# Patient Record
Sex: FEMALE | Race: WHITE | NOT HISPANIC OR LATINO | Employment: FULL TIME | URBAN - NONMETROPOLITAN AREA
[De-identification: names, ages, dates, MRNs, and addresses within clinical notes are randomized per-mention and may not be internally consistent; named-entity substitution may affect disease eponyms.]

---

## 2018-02-08 ENCOUNTER — OFFICE VISIT CONVERTED (OUTPATIENT)
Dept: FAMILY MEDICINE CLINIC | Age: 53
End: 2018-02-08
Attending: NURSE PRACTITIONER

## 2018-06-05 ENCOUNTER — OFFICE VISIT CONVERTED (OUTPATIENT)
Dept: FAMILY MEDICINE CLINIC | Age: 53
End: 2018-06-05
Attending: NURSE PRACTITIONER

## 2018-06-07 LAB
ALBUMIN SERPL-MCNC: 4.5 G/DL
ALBUMIN/GLOB SERPL: 2 {RATIO}
ALP SERPL-CCNC: 95 IU/L
ALT SERPL-CCNC: 36 IU/L
AST SERPL-CCNC: 30 IU/L
BASOPHILS # BLD AUTO: 0 X10E3/UL
BASOPHILS NFR BLD AUTO: 0 %
BILIRUB SERPL-MCNC: 0.4 MG/DL
BUN SERPL-MCNC: 14 MG/DL
BUN/CREAT SERPL: 22
CALCIUM SERPL-MCNC: 9.6 MG/DL
CHLORIDE SERPL-SCNC: 98 MMOL/L
CO2 SERPL-SCNC: 25 MMOL/L
CONV IMMATURE GRAN: 0 %
CONV TOTAL PROTEIN: 6.8 G/DL
CREAT UR-MCNC: 0.64 MG/DL
EOSINOPHIL # BLD AUTO: 0.2 X10E3/UL
EOSINOPHIL # BLD AUTO: 2 %
ERYTHROCYTE [DISTWIDTH] IN BLOOD BY AUTOMATED COUNT: 14 %
GLOBULIN UR ELPH-MCNC: 2.3 G/DL
GLUCOSE SERPL-MCNC: 192 MG/DL
HBA1C MFR BLD: 8.3 %
HCT VFR BLD AUTO: 39 %
HGB BLD-MCNC: 12.6 G/DL
IMM GRANULOCYTES # BLD: 0 X10E3/UL
LYMPHOCYTES # BLD AUTO: 2.4 X10E3/UL
LYMPHOCYTES NFR BLD AUTO: 30 %
MCH RBC QN AUTO: 28.2 PG
MCHC RBC AUTO-ENTMCNC: 32.3 G/DL
MCV RBC AUTO: 87 FL
MONOCYTES # BLD AUTO: 0.5 X10E3/UL
MONOCYTES NFR BLD AUTO: 7 %
NEUTROPHILS # BLD AUTO: 5 X10E3/UL
NEUTROPHILS NFR BLD AUTO: 61 %
PLATELET # BLD AUTO: 212 X10E3/UL
POTASSIUM SERPL-SCNC: 4.2 MMOL/L
RBC # BLD AUTO: 4.47 X10E6/UL
SODIUM SERPL-SCNC: 141 MMOL/L
TSH SERPL-ACNC: 2.38 UIU/ML
WBC # BLD AUTO: 8.2 X10E3/UL

## 2018-07-12 ENCOUNTER — OFFICE VISIT CONVERTED (OUTPATIENT)
Dept: FAMILY MEDICINE CLINIC | Age: 53
End: 2018-07-12
Attending: NURSE PRACTITIONER

## 2018-10-03 ENCOUNTER — OFFICE VISIT CONVERTED (OUTPATIENT)
Dept: FAMILY MEDICINE CLINIC | Age: 53
End: 2018-10-03
Attending: NURSE PRACTITIONER

## 2018-10-03 ENCOUNTER — CONVERSION ENCOUNTER (OUTPATIENT)
Dept: FAMILY MEDICINE CLINIC | Age: 53
End: 2018-10-03

## 2018-10-04 LAB
ALBUMIN SERPL-MCNC: 4.8 G/DL
ALBUMIN/GLOB SERPL: 2.3 {RATIO}
ALP SERPL-CCNC: 92 IU/L
ALT SERPL-CCNC: 31 IU/L
AMYLASE SERPL-CCNC: 47 U/L
AST SERPL-CCNC: 26 IU/L
BASOPHILS # BLD AUTO: 0 X10E3/UL
BASOPHILS NFR BLD AUTO: 0 %
BILIRUB SERPL-MCNC: 0.3 MG/DL
BUN SERPL-MCNC: 17 MG/DL
BUN/CREAT SERPL: 32
CALCIUM SERPL-MCNC: 10.4 MG/DL
CHLORIDE SERPL-SCNC: 101 MMOL/L
CO2 SERPL-SCNC: 22 MMOL/L
CONV IMMATURE GRAN: 0 %
CONV TOTAL PROTEIN: 6.9 G/DL
CREAT UR-MCNC: 0.53 MG/DL
EOSINOPHIL # BLD AUTO: 0.2 X10E3/UL
EOSINOPHIL # BLD AUTO: 2 %
ERYTHROCYTE [DISTWIDTH] IN BLOOD BY AUTOMATED COUNT: 14.2 %
GLOBULIN UR ELPH-MCNC: 2.1 G/DL
GLUCOSE SERPL-MCNC: 163 MG/DL
HCT VFR BLD AUTO: 39.1 %
HGB BLD-MCNC: 13.1 G/DL
IMM GRANULOCYTES # BLD: 0 X10E3/UL
LIPASE SERPL-CCNC: 23 U/L
LYMPHOCYTES # BLD AUTO: 3 X10E3/UL
LYMPHOCYTES NFR BLD AUTO: 31 %
MCH RBC QN AUTO: 28.1 PG
MCHC RBC AUTO-ENTMCNC: 33.5 G/DL
MCV RBC AUTO: 84 FL
MONOCYTES # BLD AUTO: 0.6 X10E3/UL
MONOCYTES NFR BLD AUTO: 6 %
NEUTROPHILS # BLD AUTO: 5.7 X10E3/UL
NEUTROPHILS NFR BLD AUTO: 61 %
PLATELET # BLD AUTO: 236 X10E3/UL
POTASSIUM SERPL-SCNC: 4.5 MMOL/L
RBC # BLD AUTO: 4.66 X10E6/UL
SODIUM SERPL-SCNC: 142 MMOL/L
WBC # BLD AUTO: 9.5 X10E3/UL

## 2018-10-05 LAB — HBA1C MFR BLD: 7.8 %

## 2018-10-11 ENCOUNTER — OFFICE VISIT CONVERTED (OUTPATIENT)
Dept: FAMILY MEDICINE CLINIC | Age: 53
End: 2018-10-11
Attending: NURSE PRACTITIONER

## 2018-11-30 ENCOUNTER — CONVERSION ENCOUNTER (OUTPATIENT)
Dept: OTHER | Facility: HOSPITAL | Age: 53
End: 2018-11-30

## 2019-01-10 ENCOUNTER — HOSPITAL ENCOUNTER (OUTPATIENT)
Dept: OTHER | Facility: HOSPITAL | Age: 54
Discharge: HOME | End: 2019-01-10

## 2019-01-10 ENCOUNTER — OFFICE VISIT CONVERTED (OUTPATIENT)
Dept: FAMILY MEDICINE CLINIC | Age: 54
End: 2019-01-10
Attending: NURSE PRACTITIONER

## 2019-01-12 LAB — BACTERIA SPEC AEROBE CULT: NORMAL

## 2019-01-21 ENCOUNTER — OFFICE VISIT CONVERTED (OUTPATIENT)
Dept: FAMILY MEDICINE CLINIC | Age: 54
End: 2019-01-21
Attending: NURSE PRACTITIONER

## 2019-02-05 LAB
ALBUMIN SERPL-MCNC: 4.6 G/DL
ALBUMIN/GLOB SERPL: 2.2 {RATIO}
ALP SERPL-CCNC: 82 IU/L
ALT SERPL-CCNC: 45 IU/L
AST SERPL-CCNC: 32 IU/L
BASOPHILS # BLD AUTO: 0 X10E3/UL
BASOPHILS NFR BLD AUTO: 0 %
BILIRUB SERPL-MCNC: 0.3 MG/DL
BUN SERPL-MCNC: 11 MG/DL
BUN/CREAT SERPL: 18
CALCIUM SERPL-MCNC: 9.3 MG/DL
CHLORIDE SERPL-SCNC: 103 MMOL/L
CHOLEST SERPL-MCNC: 120 MG/DL
CO2 SERPL-SCNC: 23 MMOL/L
CONV IMMATURE GRAN: 0 %
CONV TOTAL PROTEIN: 6.7 G/DL
CREAT UR-MCNC: 0.6 MG/DL
EOSINOPHIL # BLD AUTO: 0.1 X10E3/UL
EOSINOPHIL # BLD AUTO: 2 %
ERYTHROCYTE [DISTWIDTH] IN BLOOD BY AUTOMATED COUNT: 14.4 %
GLOBULIN UR ELPH-MCNC: 2.1 G/DL
GLUCOSE SERPL-MCNC: 153 MG/DL
HBA1C MFR BLD: 8.1 %
HCT VFR BLD AUTO: 38.8 %
HDLC SERPL-MCNC: 38 MG/DL
HGB BLD-MCNC: 12.1 G/DL
IMM GRANULOCYTES # BLD: 0 X10E3/UL
LDLC SERPL CALC-MCNC: 41 MG/DL
LYMPHOCYTES # BLD AUTO: 2.5 X10E3/UL
LYMPHOCYTES NFR BLD AUTO: 40 %
MCH RBC QN AUTO: 27.5 PG
MCHC RBC AUTO-ENTMCNC: 31.2 G/DL
MCV RBC AUTO: 88 FL
MICROALBUMIN UR-MCNC: 30.8 UG/ML
MONOCYTES # BLD AUTO: 0.3 X10E3/UL
MONOCYTES NFR BLD AUTO: 5 %
NEUTROPHILS # BLD AUTO: 3.2 X10E3/UL
NEUTROPHILS NFR BLD AUTO: 53 %
PLATELET # BLD AUTO: 186 X10E3/UL
POTASSIUM SERPL-SCNC: 4.1 MMOL/L
RBC # BLD AUTO: 4.4 X10E6/UL
SODIUM SERPL-SCNC: 145 MMOL/L
TRIGL SERPL-MCNC: 205 MG/DL
TSH SERPL-ACNC: 2.14 UIU/ML
VLDLC SERPL-MCNC: 41 MG/DL
WBC # BLD AUTO: 6.2 X10E3/UL

## 2019-02-15 ENCOUNTER — OFFICE VISIT CONVERTED (OUTPATIENT)
Dept: FAMILY MEDICINE CLINIC | Age: 54
End: 2019-02-15
Attending: NURSE PRACTITIONER

## 2019-04-01 ENCOUNTER — OFFICE VISIT CONVERTED (OUTPATIENT)
Dept: FAMILY MEDICINE CLINIC | Age: 54
End: 2019-04-01
Attending: NURSE PRACTITIONER

## 2019-06-04 ENCOUNTER — OFFICE VISIT CONVERTED (OUTPATIENT)
Dept: FAMILY MEDICINE CLINIC | Age: 54
End: 2019-06-04
Attending: NURSE PRACTITIONER

## 2019-06-15 LAB
ALBUMIN SERPL-MCNC: 4.7 G/DL
ALBUMIN/GLOB SERPL: 2 {RATIO}
ALP SERPL-CCNC: 90 IU/L
ALT SERPL-CCNC: 26 IU/L
AST SERPL-CCNC: 22 IU/L
BASOPHILS # BLD AUTO: 0 X10E3/UL
BASOPHILS NFR BLD AUTO: 0 %
BILIRUB SERPL-MCNC: 0.3 MG/DL
BUN SERPL-MCNC: 16 MG/DL
BUN/CREAT SERPL: 28
CALCIUM SERPL-MCNC: 10.4 MG/DL
CHLORIDE SERPL-SCNC: 98 MMOL/L
CHOLEST SERPL-MCNC: 105 MG/DL
CO2 SERPL-SCNC: 24 MMOL/L
CONV IMMATURE GRAN: 0 %
CONV TOTAL PROTEIN: 7 G/DL
CREAT UR-MCNC: 0.57 MG/DL
EOSINOPHIL # BLD AUTO: 0.1 X10E3/UL
EOSINOPHIL # BLD AUTO: 1 %
ERYTHROCYTE [DISTWIDTH] IN BLOOD BY AUTOMATED COUNT: 13.5 %
GLOBULIN UR ELPH-MCNC: 2.3 G/DL
GLUCOSE SERPL-MCNC: 171 MG/DL
HBA1C MFR BLD: 7.7 %
HCT VFR BLD AUTO: 41.8 %
HDLC SERPL-MCNC: 39 MG/DL
HGB BLD-MCNC: 13.6 G/DL
IMM GRANULOCYTES # BLD: 0 X10E3/UL
LDLC SERPL CALC-MCNC: 38 MG/DL
LYMPHOCYTES # BLD AUTO: 2.8 X10E3/UL
LYMPHOCYTES NFR BLD AUTO: 29 %
MCH RBC QN AUTO: 28.6 PG
MCHC RBC AUTO-ENTMCNC: 32.5 G/DL
MCV RBC AUTO: 88 FL
MONOCYTES # BLD AUTO: 0.7 X10E3/UL
MONOCYTES NFR BLD AUTO: 7 %
NEUTROPHILS # BLD AUTO: 6.1 X10E3/UL
NEUTROPHILS NFR BLD AUTO: 63 %
PLATELET # BLD AUTO: 229 X10E3/UL
POTASSIUM SERPL-SCNC: 4.2 MMOL/L
RBC # BLD AUTO: 4.75 X10E6/UL
SODIUM SERPL-SCNC: 138 MMOL/L
TRIGL SERPL-MCNC: 140 MG/DL
TSH SERPL-ACNC: 2.32 UIU/ML
VLDLC SERPL-MCNC: 28 MG/DL
WBC # BLD AUTO: 9.7 X10E3/UL

## 2019-07-19 ENCOUNTER — CONVERSION ENCOUNTER (OUTPATIENT)
Dept: FAMILY MEDICINE CLINIC | Age: 54
End: 2019-07-19

## 2019-07-20 LAB — PTH-INTACT SERPL-MCNC: 23 PG/ML

## 2019-09-25 ENCOUNTER — OFFICE VISIT CONVERTED (OUTPATIENT)
Dept: FAMILY MEDICINE CLINIC | Age: 54
End: 2019-09-25
Attending: NURSE PRACTITIONER

## 2019-11-19 ENCOUNTER — OFFICE VISIT CONVERTED (OUTPATIENT)
Dept: FAMILY MEDICINE CLINIC | Age: 54
End: 2019-11-19
Attending: NURSE PRACTITIONER

## 2019-11-22 ENCOUNTER — OFFICE VISIT CONVERTED (OUTPATIENT)
Dept: FAMILY MEDICINE CLINIC | Age: 54
End: 2019-11-22
Attending: NURSE PRACTITIONER

## 2019-11-26 ENCOUNTER — HOSPITAL ENCOUNTER (OUTPATIENT)
Dept: OTHER | Facility: HOSPITAL | Age: 54
Discharge: HOME | End: 2019-11-26

## 2019-12-11 ENCOUNTER — OFFICE VISIT CONVERTED (OUTPATIENT)
Dept: FAMILY MEDICINE CLINIC | Age: 54
End: 2019-12-11
Attending: NURSE PRACTITIONER

## 2019-12-21 LAB
ALBUMIN SERPL-MCNC: 4.9 G/DL
ALBUMIN/GLOB SERPL: 2.3 {RATIO}
ALP SERPL-CCNC: 89 IU/L
ALT SERPL-CCNC: 29 IU/L
AST SERPL-CCNC: 22 IU/L
BASOPHILS # BLD AUTO: 0 X10E3/UL
BASOPHILS NFR BLD AUTO: 0 %
BILIRUB SERPL-MCNC: 0.3 MG/DL
BUN SERPL-MCNC: 14 MG/DL
BUN/CREAT SERPL: 19
CALCIUM SERPL-MCNC: 10.2 MG/DL
CHLORIDE SERPL-SCNC: 98 MMOL/L
CHOLEST SERPL-MCNC: 122 MG/DL
CO2 SERPL-SCNC: 26 MMOL/L
CONV IMMATURE GRAN: 0 %
CONV TOTAL PROTEIN: 7 G/DL
CREAT UR-MCNC: 0.73 MG/DL
EOSINOPHIL # BLD AUTO: 0.2 X10E3/UL
EOSINOPHIL # BLD AUTO: 2 %
ERYTHROCYTE [DISTWIDTH] IN BLOOD BY AUTOMATED COUNT: 14.2 %
GLOBULIN UR ELPH-MCNC: 2.1 G/DL
GLUCOSE SERPL-MCNC: 194 MG/DL
HBA1C MFR BLD: 7.3 %
HCT VFR BLD AUTO: 42.8 %
HDLC SERPL-MCNC: 41 MG/DL
HGB BLD-MCNC: 13.9 G/DL
IMM GRANULOCYTES # BLD: 0 X10E3/UL
LDLC SERPL CALC-MCNC: 50 MG/DL
LYMPHOCYTES # BLD AUTO: 3.1 X10E3/UL
LYMPHOCYTES NFR BLD AUTO: 33 %
MCH RBC QN AUTO: 28.4 PG
MCHC RBC AUTO-ENTMCNC: 32.5 G/DL
MCV RBC AUTO: 88 FL
MICROALBUMIN UR-MCNC: 15.5 UG/ML
MONOCYTES # BLD AUTO: 0.6 X10E3/UL
MONOCYTES NFR BLD AUTO: 6 %
NEUTROPHILS # BLD AUTO: 5.5 X10E3/UL
NEUTROPHILS NFR BLD AUTO: 59 %
PLATELET # BLD AUTO: 252 X10E3/UL
POTASSIUM SERPL-SCNC: 4.5 MMOL/L
RBC # BLD AUTO: 4.89 X10E6/UL
SODIUM SERPL-SCNC: 141 MMOL/L
TRIGL SERPL-MCNC: 154 MG/DL
TSH SERPL-ACNC: 2.82 UIU/ML
VLDLC SERPL-MCNC: 31 MG/DL
WBC # BLD AUTO: 9.3 X10E3/UL

## 2019-12-23 ENCOUNTER — HOSPITAL ENCOUNTER (OUTPATIENT)
Dept: OTHER | Facility: HOSPITAL | Age: 54
Discharge: HOME | End: 2019-12-23

## 2020-01-23 ENCOUNTER — OFFICE VISIT CONVERTED (OUTPATIENT)
Dept: FAMILY MEDICINE CLINIC | Age: 55
End: 2020-01-23
Attending: NURSE PRACTITIONER

## 2020-03-13 ENCOUNTER — OFFICE VISIT CONVERTED (OUTPATIENT)
Dept: FAMILY MEDICINE CLINIC | Age: 55
End: 2020-03-13
Attending: NURSE PRACTITIONER

## 2020-05-01 ENCOUNTER — CONVERSION ENCOUNTER (OUTPATIENT)
Dept: FAMILY MEDICINE CLINIC | Age: 55
End: 2020-05-01

## 2020-05-02 LAB
ALBUMIN SERPL-MCNC: 4.8 G/DL
ALBUMIN/GLOB SERPL: 2.3 {RATIO}
ALP SERPL-CCNC: 89 IU/L
ALT SERPL-CCNC: 42 IU/L
AST SERPL-CCNC: 34 IU/L
BILIRUB SERPL-MCNC: 0.4 MG/DL
BUN SERPL-MCNC: 12 MG/DL
BUN/CREAT SERPL: 17
CALCIUM SERPL-MCNC: 9.6 MG/DL
CHLORIDE SERPL-SCNC: 98 MMOL/L
CO2 SERPL-SCNC: 23 MMOL/L
CONV TOTAL PROTEIN: 6.9 G/DL
CREAT UR-MCNC: 0.7 MG/DL
GLOBULIN UR ELPH-MCNC: 2.1 G/DL
GLUCOSE SERPL-MCNC: 160 MG/DL
HBA1C MFR BLD: 7.1 %
POTASSIUM SERPL-SCNC: 4.4 MMOL/L
SODIUM SERPL-SCNC: 141 MMOL/L

## 2020-07-08 ENCOUNTER — OFFICE VISIT CONVERTED (OUTPATIENT)
Dept: FAMILY MEDICINE CLINIC | Age: 55
End: 2020-07-08
Attending: NURSE PRACTITIONER

## 2020-09-21 ENCOUNTER — OFFICE VISIT CONVERTED (OUTPATIENT)
Dept: FAMILY MEDICINE CLINIC | Age: 55
End: 2020-09-21
Attending: NURSE PRACTITIONER

## 2020-10-02 ENCOUNTER — CONVERSION ENCOUNTER (OUTPATIENT)
Dept: FAMILY MEDICINE CLINIC | Age: 55
End: 2020-10-02

## 2020-10-03 LAB
ALBUMIN SERPL-MCNC: 4.4 G/DL
ALBUMIN/GLOB SERPL: 1.9 {RATIO}
ALP SERPL-CCNC: 93 IU/L
ALT SERPL-CCNC: 42 IU/L
AST SERPL-CCNC: 33 IU/L
BILIRUB SERPL-MCNC: 0.4 MG/DL
BUN SERPL-MCNC: 19 MG/DL
BUN/CREAT SERPL: 26
CALCIUM SERPL-MCNC: 9.3 MG/DL
CHLORIDE SERPL-SCNC: 102 MMOL/L
CHOLEST SERPL-MCNC: 106 MG/DL
CO2 SERPL-SCNC: 24 MMOL/L
CONV TOTAL PROTEIN: 6.7 G/DL
CREAT 24H UR-MCNC: 187.7 MG/DL
CREAT UR-MCNC: 0.72 MG/DL
GLOBULIN UR ELPH-MCNC: 2.3 G/DL
GLUCOSE SERPL-MCNC: 167 MG/DL
HBA1C MFR BLD: 7.6 %
HDLC SERPL-MCNC: 37 MG/DL
LDLC SERPL CALC-MCNC: 44 MG/DL
MICROALBUMIN UR-MCNC: 37.2 UG/ML
MICROALBUMIN/CREAT UR: 20 MG/G CREAT
POTASSIUM SERPL-SCNC: 4.7 MMOL/L
SODIUM SERPL-SCNC: 141 MMOL/L
TRIGL SERPL-MCNC: 142 MG/DL
TSH SERPL-ACNC: 3.05 UIU/ML
VLDLC SERPL-MCNC: 25 MG/DL

## 2020-12-18 ENCOUNTER — OFFICE VISIT CONVERTED (OUTPATIENT)
Dept: FAMILY MEDICINE CLINIC | Age: 55
End: 2020-12-18
Attending: NURSE PRACTITIONER

## 2021-03-08 ENCOUNTER — OFFICE VISIT CONVERTED (OUTPATIENT)
Dept: FAMILY MEDICINE CLINIC | Age: 56
End: 2021-03-08
Attending: NURSE PRACTITIONER

## 2021-03-09 ENCOUNTER — HOSPITAL ENCOUNTER (OUTPATIENT)
Dept: OTHER | Facility: HOSPITAL | Age: 56
Discharge: HOME | End: 2021-03-09
Attending: NURSE PRACTITIONER

## 2021-03-13 LAB
CONV LAST MENSTURAL PERIOD: NORMAL
HPV HYBRID CAPTURE HIGH RISK: NEGATIVE
PATHOLOGIST REVIEW: NORMAL
SPECIMEN SOURCE: NORMAL
SPECIMEN SOURCE: NORMAL
THIN PREP CVX: NORMAL

## 2021-03-30 ENCOUNTER — CONVERSION ENCOUNTER (OUTPATIENT)
Dept: FAMILY MEDICINE CLINIC | Age: 56
End: 2021-03-30

## 2021-03-31 LAB
ALBUMIN SERPL-MCNC: 4.7 G/DL
ALBUMIN/GLOB SERPL: 2 {RATIO}
ALP SERPL-CCNC: 94 IU/L
ALT SERPL-CCNC: 35 IU/L
AST SERPL-CCNC: 30 IU/L
BILIRUB SERPL-MCNC: 0.3 MG/DL
BUN SERPL-MCNC: 21 MG/DL
BUN/CREAT SERPL: 30
CALCIUM SERPL-MCNC: 10 MG/DL
CHLORIDE SERPL-SCNC: 100 MMOL/L
CHOLEST SERPL-MCNC: 112 MG/DL
CO2 SERPL-SCNC: 22 MMOL/L
CONV TOTAL PROTEIN: 7 G/DL
CREAT 24H UR-MCNC: 55.3 MG/DL
CREAT UR-MCNC: 0.7 MG/DL
GLOBULIN UR ELPH-MCNC: 2.3 G/DL
GLUCOSE SERPL-MCNC: 177 MG/DL
HBA1C MFR BLD: 7.2 %
HDLC SERPL-MCNC: 44 MG/DL
LDLC SERPL CALC-MCNC: 43 MG/DL
MICROALBUMIN UR-MCNC: 6.1 UG/ML
MICROALBUMIN/CREAT UR: 11 MG/G CREAT
POTASSIUM SERPL-SCNC: 4.2 MMOL/L
SODIUM SERPL-SCNC: 140 MMOL/L
TRIGL SERPL-MCNC: 147 MG/DL
TSH SERPL-ACNC: 2.01 UIU/ML
VLDLC SERPL-MCNC: 25 MG/DL

## 2021-05-06 ENCOUNTER — OFFICE VISIT (OUTPATIENT)
Dept: ORTHOPEDIC SURGERY | Facility: CLINIC | Age: 56
End: 2021-05-06

## 2021-05-06 VITALS — TEMPERATURE: 97.5 F | BODY MASS INDEX: 43.95 KG/M2 | WEIGHT: 218 LBS | HEIGHT: 59 IN

## 2021-05-06 DIAGNOSIS — S83.8X9A: Primary | ICD-10-CM

## 2021-05-06 PROCEDURE — 99204 OFFICE O/P NEW MOD 45 MIN: CPT | Performed by: ORTHOPAEDIC SURGERY

## 2021-05-06 RX ORDER — LOSARTAN POTASSIUM 50 MG/1
50 TABLET ORAL DAILY
COMMUNITY
End: 2021-07-26

## 2021-05-06 RX ORDER — BUPROPION HYDROCHLORIDE 150 MG/1
150 TABLET, EXTENDED RELEASE ORAL DAILY
COMMUNITY
End: 2021-08-30

## 2021-05-06 RX ORDER — ROSUVASTATIN CALCIUM 10 MG/1
10 TABLET, COATED ORAL DAILY
COMMUNITY
End: 2021-08-24

## 2021-05-06 RX ORDER — HYDROCHLOROTHIAZIDE 12.5 MG/1
12.5 TABLET ORAL DAILY
COMMUNITY
End: 2021-07-26

## 2021-05-06 RX ORDER — PHENOL 1.4 %
600 AEROSOL, SPRAY (ML) MUCOUS MEMBRANE DAILY
COMMUNITY
End: 2021-08-31

## 2021-05-18 NOTE — PROGRESS NOTES
Viky Swan 1965     Office/Outpatient Visit    Visit Date: Thu, Feb 8, 2018 12:12 pm    Provider: Clementina Barrientos N.P. (Assistant: Pauline Cota MA)    Location: [facility]        Electronically signed by Clementina Barrientos N.P. on  02/08/2018 01:19:20 PM                             SUBJECTIVE:        CC:     Mr. Swan is a 52 year old White female.  This is a follow-up visit.          HPI:         Patient to be evaluated for depression with anxiety.  States doing well. Here for f/u and refills.          Dx with hTN; cpontrolled on medication. Here for f/u and refills.          Dx with hypercholesterolemia; states doing well on med. Here for f/u and refills.          Concerning type 2 diabetes, last A1C in september was 7.6. States she is changing her diet and decreasing her sugar. She has lost 3 pounds. States feeling better. She would like to continue this prior to medication change.          Concerning hypothyroidism, states doing well, needing refills.          Additionally, she presents with history of ear pain.      states R ear pain x 1 week. Has used otc swimmers ear with some relief. States a dry cough and some sneezing. No meds taken. States previously thinking she had the flu with nause and diarrhea episodes along with flu symptoms.      ROS:     CONSTITUTIONAL:  Negative for chills, fatigue, fever, and weight change.      EYES:  Negative for blurred vision.      E/N/T:  Positive for ear pain and sore throat.      CARDIOVASCULAR:  Negative for chest pain, orthopnea, paroxysmal nocturnal dyspnea and pedal edema.      RESPIRATORY:  Negative for dyspnea.      GASTROINTESTINAL:  Negative for abdominal pain, constipation, diarrhea, nausea and vomiting.      NEUROLOGICAL:  Negative for dizziness, headaches, paresthesias, and weakness.      PSYCHIATRIC:  Negative for anxiety, depression, and sleep disturbances.          PMH/FMH/SH:     Last Reviewed on 2/08/2018 12:42 PM by  Clementina Barrientos    Past Medical History:                 PAST MEDICAL HISTORY             GYNECOLOGICAL HISTORY:             PREVENTIVE HEALTH MAINTENANCE             COLORECTAL CANCER SCREENING: Up to date (colonoscopy q10y; sigmoidoscopy q5y; Cologuard q3y) was last done 18; colonoscopy with normal results     MAMMOGRAM: Done within last 2 years and results in are chart was last done 2017 with normal results         Surgical History:         Tubal Ligation         Family History:         Positive for Hypertension ( sister ).      Positive for Lung Cancer ( father ).          Social History:     Occupation: Homemaker     Marital Status:      Children: 2 children         Tobacco/Alcohol/Supplements:     Last Reviewed on 2018 12:42 PM by Clementina Barrientos    Tobacco: She has never smoked.      Caffeine:  She admits to consuming caffeine via coffee ( 2 servings per day ) and soda ( 3 servings per day ).          Substance Abuse History:     Last Reviewed on 2018 12:42 PM by Clementina Barrientos    NEGATIVE         Mental Health History:     Last Reviewed on 2018 12:42 PM by Clementina Barrientos        Communicable Diseases (eg STDs):     Last Reviewed on 2018 12:42 PM by Clementina Barrientos            Current Problems:     Last Reviewed on 2018 12:42 PM by Clementina Barrientos    History of noncompliance with medical treatment     Nonalcoholic fatty liver     Vitamin B12 deficiency     Obesity, NOS     Vitamin D deficiency, NOS     Lower back pain     Sleep apnea     Diffuse arthralgia     Type 2 diabetes     Hypothyroidism     HTN     Hypercholesterolemia     Depression with anxiety     GERD         Immunizations:     Fluzone (3 + years dose) 10/8/2017     Influenza Virus Vaccine, unspecified formulation 10/1/2016         Allergies:     Last Reviewed on 2018 12:42 PM by Clementina Barrientos      No Known Drug Allergies.         Current Medications:     Last Reviewed on 2018  12:42 PM by Clementina Barrientos    Levothyroxine Sodium 0.05mg Tablet 1 tab daily     Losartan/Hydrochlorothiazide 50mg/12.5mg Tablet Take 1 tablet(s) by mouth daily     Metformin HCl 1,000mg Tablets, Extended Release Take 1 tablet(s) by mouth twice daily     Crestor 10mg Tablet One tab QOD     Lancet   Lancet Check blood one to two times daily as directed for ONE TOUCH ULTRA     Aspirin (ASA) 81mg Tablets, Enteric Coated Take 1 tablet(s) by mouth daily     Fish Oil 1,200mg Softgel capsule 2 tabs daily     Multivitamins Capsules Take 1 capsule(s) by mouth daily     Vitamin D3 5,000IU Capsules 2 capsules QD     Wellbutrin XL 150mg Tablets, Extended Release 1 tab BID         OBJECTIVE:        Vitals:         Current: 2/8/2018 12:20:27 PM    Ht:  4 ft, 11 in;  Wt: 229.2 lbs;  BMI: 46.3    T: 98.3 F (oral);  BP: 131/74 mm Hg (left arm, sitting);  P: 82 bpm (left arm (BP Cuff), sitting);  sCr: 0.54 mg/dL;  GFR: 137.79        Exams:     PHYSICAL EXAM:     GENERAL: vital signs recorded - well developed, well nourished;  no apparent distress;     EYES: extraocular movements intact; conjunctiva and cornea are normal; PERRLA;     E/N/T: EARS: external auditory canal erythematous on the right;  NOSE: nasal mucosa is erythematous;  OROPHARYNX:  normal mucosa, dentition, gingiva, and posterior pharynx;     NECK: range of motion is normal; thyroid is non-palpable;     RESPIRATORY: normal respiratory rate and pattern with no distress; normal breath sounds with no rales, rhonchi, wheezes or rubs;     CARDIOVASCULAR: normal rate; rhythm is regular;  no systolic murmur; no edema;     GASTROINTESTINAL: nontender; normal bowel sounds; no organomegaly;     NEUROLOGICAL:  cranial nerves, motor and sensory function, reflexes, gait and coordination are all intact;     PSYCHIATRIC:  appropriate affect and demeanor; normal speech pattern; grossly normal memory;         ASSESSMENT:           300.4   F34.1  Depression with anxiety               DDx:     401.1   I10  HTN              DDx:     272.0   E78.2  Hypercholesterolemia              DDx:     250.00   E13.9  Type 2 diabetes              DDx:     244.9   E03.9  Hypothyroidism              DDx:     388.70   H92.01  Ear pain              DDx:         ORDERS:         Meds Prescribed:       Refill of: Levothyroxine Sodium 0.05mg Tablet 1 tab daily  #90 (Ninety) tablet(s) Refills: 0       Refill of: Wellbutrin XL (Bupropion HCl) 150mg Tablets, Extended Release 1 tab BID  #180 (One [place] and [place]) tablet(s) Refills: 0       Refill of: Losartan/Hydrochlorothiazide (Losartan/Hydrochlorothiazide)  50mg/12.5mg Tablet Take 1 tablet(s) by mouth daily  #90 (Ninety) tablet(s) Refills: 0       Refill of: Crestor (Rosuvastatin)  10mg Tablet 1 tab daily  #90 (Ninety) tablet(s) Refills: 0       Bromfed-DM (Brompheniramine/Dextromethorphan/Pseudoephedrine) Syrup 1  to 2  tsp po every 4-6 hrs prn cough/congestion.  #240 (Two [place] and Forty) ml Refills: 0       Fluticasone Propionate 50mcg/1actuation Nasal Spray 1 spray each nostil daily  #1 (One) gm Refills: 2                 PLAN:          Depression with anxiety           Prescriptions:       Refill of: Wellbutrin XL (Bupropion HCl) 150mg Tablets, Extended Release 1 tab BID  #180 (One [place] and [place]y) tablet(s) Refills: 0          HTN           Prescriptions:       Refill of: Losartan/Hydrochlorothiazide (Losartan/Hydrochlorothiazide)  50mg/12.5mg Tablet Take 1 tablet(s) by mouth daily  #90 (Ninety) tablet(s) Refills: 0          Hypercholesterolemia           Prescriptions:       Refill of: Crestor (Rosuvastatin)  10mg Tablet 1 tab daily  #90 (Ninety) tablet(s) Refills: 0          Hypothyroidism         FOLLOW-UP: Schedule a follow-up visit in 3 months..   for fasting for labs. Chronic visit follow up           Prescriptions:       Refill of: Levothyroxine Sodium 0.05mg Tablet 1 tab daily  #90 (Ninety) tablet(s) Refills: 0           Patient Education  Handouts:       Hypothyroidism           Ear pain           Prescriptions:       Bromfed-DM (Brompheniramine/Dextromethorphan/Pseudoephedrine) Syrup 1  to 2  tsp po every 4-6 hrs prn cough/congestion.  #240 (Two [place] [address] Forty) ml Refills: 0       Fluticasone Propionate 50mcg/1actuation Nasal Spray 1 spray each nostil daily  #1 (One) gm Refills: 2             Patient Recommendations:        For  Hypothyroidism:     Schedule a follow-up visit in 3 months.                APPOINTMENT INFORMATION:        Monday Tuesday Wednesday Thursday Friday Saturday Sunday            Time:___________________AM  PM   Date:_____________________             CHARGE CAPTURE:           Primary Diagnosis:     300.4 Depression with anxiety            F34.1    Dysthymic disorder              Orders:          22062   Office/outpatient visit; established patient, level 4  (In-House)           401.1 HTN            I10    Essential (primary) hypertension    272.0 Hypercholesterolemia            E78.2    Mixed hyperlipidemia    250.00 Type 2 diabetes            E13.9    Other specified diabetes mellitus without complications    244.9 Hypothyroidism            E03.9    Hypothyroidism, unspecified    388.70 Ear pain            H92.01    Otalgia, right ear

## 2021-05-18 NOTE — PROGRESS NOTES
Viky Swan 1965     Office/Outpatient Visit    Visit Date: Thu, Oct 11, 2018 09:22 am    Provider: Clementina Barrientos N.P. (Assistant: Racquel Costello RN)    Location: [facility]        Electronically signed by Clementina Barrientos N.P. on  10/12/2018 08:42:05 AM                             SUBJECTIVE:        CC:     Ms. wSan is a 53 year old White female.  Upper back pain/ Nausea and LUQ pain after eating;         HPI:         Ms. Swan presents with hypothyroidism.  States doing well on meds. Here for f/u and refills.          Dx with type 2 diabetes; pt states trying hard with diet and exercise. Previous A1C 8.3 and now 7.9. Pt is excited to get herself to goal. She would like a dietician to help.          Additionally, she presents with history of abdominal pain, unspecified.  pt states continued abd pain x 3-4 weeks. She notices LUQ, epigastric, and RUQ pain. She has also had scapula pain. States feeling like a knife is stabbing her. She has had abd bloating and nausea. States noticing symptoms after eating any foods. She has had a normal U/S in the past with abnormal Disida. Pt states insurane wouldn't pay for surgery as they said it wasn't bad enough. This has been about 7 years ago.      ROS:     CONSTITUTIONAL:  Negative for chills, fatigue, fever, and weight change.      EYES:  Negative for blurred vision.      CARDIOVASCULAR:  Negative for chest pain, orthopnea, paroxysmal nocturnal dyspnea and pedal edema.      RESPIRATORY:  Negative for dyspnea.      GASTROINTESTINAL:  Positive for abdominal pain.   Negative for constipation, diarrhea, nausea or vomiting.      NEUROLOGICAL:  Negative for dizziness, headaches, paresthesias, and weakness.      PSYCHIATRIC:  Negative for anxiety, depression, and sleep disturbances.          PMH/FMH/SH:     Last Reviewed on 7/12/2018 06:26 PM by Clementina Barrientos    Past Medical History:                 PAST MEDICAL HISTORY              GYNECOLOGICAL HISTORY:        Menopause at age 45.          PREVENTIVE HEALTH MAINTENANCE             COLORECTAL CANCER SCREENING: Up to date (colonoscopy q10y; sigmoidoscopy q5y; Cologuard q3y) was last done 18; colonoscopy with normal results     MAMMOGRAM: Done within last 2 years and results in are chart was last done 2017 with normal results         Surgical History:         Tubal Ligation         Family History:         Positive for Hypertension ( sister ).      Positive for Lung Cancer ( father ).          Social History:     Occupation: Homemaker     Marital Status:      Children: 2 children         Tobacco/Alcohol/Supplements:     Last Reviewed on 10/03/2018 10:13 AM by Sammi Pérez    Tobacco: She has never smoked.      Caffeine:  She admits to consuming caffeine via coffee ( 2 servings per day ) and soda ( 3 servings per day ).          Substance Abuse History:     Last Reviewed on 2018 06:26 PM by Clementina Barrientos    NEGATIVE         Mental Health History:     Last Reviewed on 2018 06:26 PM by Clementina Barrientos        Communicable Diseases (eg STDs):     Last Reviewed on 2018 06:26 PM by Clementina Barrientos            Current Problems:     Last Reviewed on 2018 06:26 PM by Clementina Barrientos    Back pain     History of noncompliance with medical treatment     Nonalcoholic fatty liver     Vitamin B12 deficiency     Obesity, NOS     Vitamin D deficiency, NOS     Lower back pain     Sleep apnea     Diffuse arthralgia     Type 2 diabetes     Hypothyroidism     HTN     Hypercholesterolemia     Depression with anxiety     GERD     Nausea     Epigastric abdominal pain     Rash         Immunizations:     Havrix -adult dose (HepA) 2018     Fluzone (3 + years dose) 10/8/2017     Influenza Virus Vaccine, unspecified formulation 10/1/2016         Allergies:     Last Reviewed on 10/11/2018 09:25 AM by Racquel Costello    Novocain:        Current Medications:     Last  Reviewed on 10/11/2018 09:28 AM by Racquel Costello    Wellbutrin SR 150mg Tablets, Sustained Release Take 1 tablet(s) by mouth bid     Crestor 10mg Tablet 1 tab daily     Levothyroxine Sodium 0.05mg Tablet 1 tab daily     Losartan/Hydrochlorothiazide 50mg/12.5mg Tablet Take 1 tablet(s) by mouth daily     Lancet   Lancet Check blood one to two times daily as directed for ONE TOUCH ULTRA     Metformin HCl 1,000mg Tablets, Extended Release Take 1 tablet(s) by mouth twice daily     Januvia 100mg Tablet Take 1 tablet(s) by mouth daily     Aspirin (ASA) 81mg Tablets, Enteric Coated Take 1 tablet(s) by mouth daily     Betamethasone/Clotrimazole 0.05%/1% Cream Apply small amount to affected area bid     Omeprazole 40mg Capsules, Extended Release 1 capsule daily     Zofran 4mg Tablet 1 po q 6 hours prn     Fish Oil 1,200mg Softgel capsule 2 tabs daily     Multivitamins Capsules Take 1 capsule(s) by mouth daily     Vitamin D3 5,000IU Capsules 2 capsules QD         OBJECTIVE:        Vitals:         Current: 10/11/2018 9:25:39 AM    Ht:  4 ft, 11 in;  Wt: 228.6 lbs;  BMI: 46.2    T: 97.1 F (oral);  BP: 117/69 mm Hg (left arm, sitting);  P: 78 bpm (left arm (BP Cuff), sitting);  sCr: 0.53 mg/dL;  GFR: 138.70        Exams:     PHYSICAL EXAM:     GENERAL: vital signs recorded - well developed, well nourished;  no apparent distress;     EYES: extraocular movements intact; conjunctiva and cornea are normal; PERRLA;     NECK: range of motion is normal; thyroid is non-palpable;     RESPIRATORY: normal respiratory rate and pattern with no distress; normal breath sounds with no rales, rhonchi, wheezes or rubs;     CARDIOVASCULAR: normal rate; rhythm is regular;  no systolic murmur; no edema;     GASTROINTESTINAL: mild diffuse tenderness;  normal bowel sounds; no organomegaly;     NEUROLOGICAL:  cranial nerves, motor and sensory function, reflexes, gait and coordination are all intact;     PSYCHIATRIC:  appropriate affect and demeanor;  normal speech pattern; grossly normal memory;         Lab/Test Results:             Hemoglobin A1c:  7.8 (%) (10/03/2018),             ASSESSMENT:           244.9   E03.9  Hypothyroidism              DDx:     250.00   E13.9  Type 2 diabetes              DDx:     789.00   R10.84  Abdominal pain, unspecified              DDx:         ORDERS:         Radiology/Test Orders:       [id number]  Ultrasound, abdominal  (Send-Out)           Lab Orders:       APPTO  Appointment need  (In-House)           Procedures Ordered:       REFER  Referral to Specialist or Other Facility  (Send-Out)                   PLAN: Will get labs at next visit. Ok for 3 month refills when pt calls.          Hypothyroidism Ok for refills when pt calls.           Patient Education Handouts:       Hypothyroidism           Type 2 diabetes         REFERRALS:  Referral initiated to Dietitian.      FOLLOW-UP: Schedule a follow-up visit in 3 months..            Orders:       REFER  Referral to Specialist or Other Facility  (Send-Out)         APPTO  Appointment need  (In-House)            Abdominal pain, unspecified         FOLLOW-UP TESTING #1:    RADIOLOGY:  I have ordered an abdominal ultrasound to be done today.            Orders:       [id number]  Ultrasound, abdominal  (Send-Out)               Patient Recommendations:        For  Type 2 diabetes:     Schedule a follow-up visit in 3 months.                APPOINTMENT INFORMATION:        Monday Tuesday Wednesday Thursday Friday Saturday Sunday            Time:___________________AM  PM   Date:_____________________             CHARGE CAPTURE:           Primary Diagnosis:     244.9 Hypothyroidism            E03.9    Hypothyroidism, unspecified              Orders:          [id number]   Office/outpatient visit; established patient, level 4  (In-House)           250.00 Type 2 diabetes            E13.9    Other specified diabetes mellitus without complications              Orders:          APPTO   Appointment need   (In-House)           789.00 Abdominal pain, unspecified            R10.84    Generalized abdominal pain        ADDENDUMS:      ____________________________________    Date: 10/15/2018 04:25 PM    Author: Joan Kinsey         Visit Note Faxed to:        Diabetes [facility]agment, [facility]  (Nutritionist); Number [phone]            Date: 10/16/2018 12:07 PM    Author: Earline Arreaga         Health Summary Faxed to:        User Entered Recipient; Number [phone]

## 2021-05-18 NOTE — PROGRESS NOTES
Viky Swan 1965     Office/Outpatient Visit    Visit Date:  09:04 am    Provider: Clementina Barrientos N.P. (Assistant: Pricila Zuluaga MA)    Location: [facility]        Electronically signed by Clementina Barrientos N.P. on  2019 10:56:27 AM                             SUBJECTIVE:        CC:     Ms. Swan is a 53 year old White female.  This is a follow-up visit.  chronics;         HPI:         Patient to be evaluated for hTN.  Pt states doing well on med. Here for f/u and refills.          Type 2 diabetes details; pt states trouble getting januvia. Her previous A1C was 8.1. She has changed her eating habits and losing weight. She would like to do this and come off her medications.          Sleep apnea details; other details: AKIN: Pt states compliance with cpap..          Obesity: Pt states changing eating habits and losing weight.          With regard to the hypercholesterolemia, states doing well on med. Here for f/u and refills.          Dx with depression with anxiety; states doing well on med. Here for f/u and refills.          With regard to the hypothyroidism, states doing well on med. Here for f/u and refills.      ROS:     CONSTITUTIONAL:  Negative for chills, fatigue, fever, and weight change.      EYES:  Negative for blurred vision.      CARDIOVASCULAR:  Negative for chest pain, orthopnea, paroxysmal nocturnal dyspnea and pedal edema.      RESPIRATORY:  Negative for dyspnea.      GASTROINTESTINAL:  Negative for abdominal pain, constipation, diarrhea, nausea and vomiting.      NEUROLOGICAL:  Negative for dizziness, headaches, paresthesias, and weakness.      PSYCHIATRIC:  Negative for anxiety, depression, and sleep disturbances.          PMH/FMH/SH:     Last Reviewed on 2019 09:24 AM by Clementina Barrientos    Past Medical History:                 PAST MEDICAL HISTORY             GYNECOLOGICAL HISTORY:        Menopause at age 45.          PREVENTIVE  HEALTH MAINTENANCE             COLORECTAL CANCER SCREENING: Up to date (colonoscopy q10y; sigmoidoscopy q5y; Cologuard q3y) was last done 1-23-18; colonoscopy with normal results     MAMMOGRAM: Done within last 2 years and results in are chart was last done 11/2017 with normal results         Surgical History:         Tubal Ligation         Family History:         Positive for Hypertension ( sister ).      Positive for Lung Cancer ( father ).          Social History:     Occupation: Homemaker     Marital Status:      Children: 2 children         Tobacco/Alcohol/Supplements:     Last Reviewed on 4/01/2019 09:24 AM by Clementina Barrientos    Tobacco: She has never smoked.      Caffeine:  She admits to consuming caffeine via coffee ( 2 servings per day ) and soda ( 3 servings per day ).          Substance Abuse History:     Last Reviewed on 4/01/2019 09:24 AM by Clementina Barrientos    NEGATIVE         Mental Health History:     Last Reviewed on 4/01/2019 09:24 AM by Clementina Barrientos        Communicable Diseases (eg STDs):     Last Reviewed on 4/01/2019 09:24 AM by Clementina Barrientos            Current Problems:     Last Reviewed on 4/01/2019 09:24 AM by Clementina Barrientos    Back pain     History of noncompliance with medical treatment     Nonalcoholic fatty liver     Vitamin B12 deficiency     Obesity, NOS     Vitamin D deficiency, NOS     Lower back pain     Sleep apnea     Type 2 diabetes     HTN     Hypothyroidism     Hypercholesterolemia     Depression with anxiety     GERD     R otitis media         Immunizations:     Havrix -adult dose (HepA) 5/18/2018     Fluzone (3 + years dose) 10/8/2017     Fluzone Quadrivalent (3+ years) 10/1/2018     Influenza Virus Vaccine, unspecified formulation 10/1/2016         Allergies:     Last Reviewed on 4/01/2019 09:24 AM by Clementina Barrientos    Novocain:        Current Medications:     Last Reviewed on 4/01/2019 09:24 AM by Clementina Barrientos    Januvia 100mg Tablet Take 1  tablet(s) by mouth daily     Metformin HCl 1,000mg Tablets, Extended Release Take one PO BID     Crestor 10mg Tablet 1 tab daily     Levothyroxine Sodium 0.05mg Tablet 1 tab daily     Wellbutrin SR 150mg Tablets, Sustained Release Take 1 tablet(s) by mouth bid     Lancet   Lancet Check blood one to two times daily as directed for ONE TOUCH ULTRA     Losartan/Hydrochlorothiazide 50mg/12.5mg Tablet Take 1 tablet(s) by mouth daily     Aspirin (ASA) 81mg Tablets, Enteric Coated Take 1 tablet(s) by mouth daily     Fish Oil 1,200mg Softgel capsule 2 tabs daily     Multivitamins Capsules Take 1 capsule(s) by mouth daily     Vitamin D3 5,000IU Capsules 2 capsules QD         OBJECTIVE:        Vitals:         Current: 4/1/2019 9:10:37 AM    Ht:  4 ft, 11 in;  Wt: 221.8 lbs;  BMI: 44.8    T: 97.5 F (oral);  BP: 117/70 mm Hg (right arm, sitting);  P: 80 bpm (right arm (BP Cuff), sitting);  sCr: 0.6 mg/dL;  GFR: 120.96        Exams:     PHYSICAL EXAM:     GENERAL: vital signs recorded - well developed, well nourished;  no apparent distress;     EYES: extraocular movements intact; conjunctiva and cornea are normal; PERRLA;     NECK: range of motion is normal; thyroid is non-palpable;     RESPIRATORY: normal respiratory rate and pattern with no distress; normal breath sounds with no rales, rhonchi, wheezes or rubs;     CARDIOVASCULAR: normal rate; rhythm is regular;  no systolic murmur; no edema;     GASTROINTESTINAL: nontender; normal bowel sounds; no organomegaly;     NEUROLOGICAL:  cranial nerves, motor and sensory function, reflexes, gait and coordination are all intact;     PSYCHIATRIC:  appropriate affect and demeanor; normal speech pattern; grossly normal memory;         ASSESSMENT:           401.1   I10  HTN              DDx:     250.00   E13.9  Type 2 diabetes              DDx:     780.57   G47.33  Sleep apnea              DDx:     278.00   E66.9  Obesity, NOS              DDx:     272.0   E78.2  Hypercholesterolemia               DDx:     300.4   F34.1  Depression with anxiety              DDx:     244.9   E03.9  Hypothyroidism              DDx:         ORDERS:         Meds Prescribed:       Refill of: Losartan/Hydrochlorothiazide 50mg/12.5mg Tablet Take 1 tablet(s) by mouth daily  #90 (Ninety) tablet(s) Refills: 1       Refill of: Januvia (Sitagliptin Phosphate) 100mg Tablet Take 1 tablet(s) by mouth daily  #90 (Ninety) tablet(s) Refills: 1       Refill of: Metformin HCl 1,000mg Tablets, Extended Release Take one PO BID  #180 (One [place] and Eighty) tablet(s) Refills: 1       Refill of: Wellbutrin SR (Bupropion HCl) 150mg Tablets, Sustained Release Take 1 tablet(s) by mouth bid  #180 (One [place] and [place]y) tablet(s) Refills: 1       Refill of: Levothyroxine Sodium 0.05mg Tablet 1 tab daily  #90 (Ninety) tablet(s) Refills: 1       Refill of: Lancet  Lancet Check blood one to two times daily as directed for ONE TOUCH ULTRA  #100 (One [place]) each Refills: 0       Refill of: Crestor (Rosuvastatin)  10mg Tablet 1 tab daily  #90 (Ninety) tablet(s) Refills: 1         Lab Orders:       APPTO  Appointment need  (In-House)                   PLAN:          HTN         FOLLOW-UP: Schedule a follow-up visit in 1 month..            Prescriptions:       Refill of: Losartan/Hydrochlorothiazide 50mg/12.5mg Tablet Take 1 tablet(s) by mouth daily  #90 (Ninety) tablet(s) Refills: 1           Orders:       APPTO  Appointment need  (In-House)            Type 2 diabetes           Prescriptions:       Refill of: Januvia (Sitagliptin Phosphate) 100mg Tablet Take 1 tablet(s) by mouth daily  #90 (Ninety) tablet(s) Refills: 1       Refill of: Metformin HCl 1,000mg Tablets, Extended Release Take one PO BID  #180 (One [place] [address] [place]y) tablet(s) Refills: 1       Refill of: Lancet  Lancet Check blood one to two times daily as directed for ONE TOUCH ULTRA  #100 (One [place]) each Refills: 0          Sleep apnea continue cpap.          Obesity, NOS continue  dietary changes.          Hypercholesterolemia           Prescriptions:       Refill of: Crestor (Rosuvastatin)  10mg Tablet 1 tab daily  #90 (Ninety) tablet(s) Refills: 1          Depression with anxiety           Prescriptions:       Refill of: Wellbutrin SR (Bupropion HCl) 150mg Tablets, Sustained Release Take 1 tablet(s) by mouth bid  #180 (One [place] and Eighty) tablet(s) Refills: 1          Hypothyroidism           Prescriptions:       Refill of: Levothyroxine Sodium 0.05mg Tablet 1 tab daily  #90 (Ninety) tablet(s) Refills: 1             Patient Recommendations:        For  HTN:     Schedule a follow-up visit in 1 month.                APPOINTMENT INFORMATION:        Monday Tuesday Wednesday Thursday Friday Saturday Sunday            Time:___________________AM  PM   Date:_____________________             CHARGE CAPTURE:           Primary Diagnosis:     401.1 HTN            I10    Essential (primary) hypertension              Orders:          00832   Office/outpatient visit; established patient, level 4  (In-House)             APPTO   Appointment need  (In-House)           250.00 Type 2 diabetes            E13.9    Other specified diabetes mellitus without complications    780.57 Sleep apnea            G47.33    Obstructive sleep apnea (adult) (pediatric)    278.00 Obesity, NOS            E66.9    Obesity, unspecified    272.0 Hypercholesterolemia            E78.2    Mixed hyperlipidemia    300.4 Depression with anxiety            F34.1    Dysthymic disorder    244.9 Hypothyroidism            E03.9    Hypothyroidism, unspecified

## 2021-05-18 NOTE — PROGRESS NOTES
Viky Swan  1965     Office/Outpatient Visit    Visit Date: Fri, Dec 18, 2020 01:37 pm    Provider: Magda Pat N.P. (Assistant: Luz Elena Sandy,  )    Location: [facility]        Electronically signed by Magda Pat N.P. on  12/23/2020 01:24:27 PM                             Subjective:        CC: Ms. Swan is a 55 year old White female.  check up;         HPI:           PHQ-9 Depression Screening: Completed form scanned and in chart; Total Score 6           In regard to the encounter for general adult medical examination without abnormal findings, her last Pap smear was in 9/8/17 and was normal.   Her last mammogram was in 12/23/19 and was normal.   Her last DEXA was in 12/19/19 and showed osteopenia.   She underwent colonoscopy in 1/23/18 with normal results.   Preventative Health updated today.  She is current with her Td and influenza immunization.  Tobacco: She has never smoked.  She has history of Type 2 diabetes. Last HgbA1C was 7.6.  10/2/2020 . Currently taking Metformin and Januvia. She is on ARB, statin, and baby Aspirin as well. Last eye exam 12/2020 with Dr George. She has recently started doing yoga and really watching her diet more.     On Levothyroxine for hypothyroidism. TSH last checked 10/2020 and was normal.    On Losartan/HCTZ for HTN. Doing well.     On Crestor and fish oil for mixed hyperlipidemia.     AKIN diagnosed in the past couple of years. She is compliant with CPAP machine and has noticed improvement in her sleep.    History of depression with anxiety. Doing well. Has been taking Wellbutrin for at least 5 years. Denies suicidal ideation.    Osteopenia noted on bone density scan. She is taking OTC Vitamin D.    ROS:     CONSTITUTIONAL:  Negative for chills and fever.      EYES:  Negative for eye drainage and eye pain.      E/N/T:  Negative for ear pain and sore throat.      CARDIOVASCULAR:  Negative for chest pain and palpitations.       RESPIRATORY:  Negative for dyspnea and frequent wheezing.      GASTROINTESTINAL:  Negative for abdominal pain and vomiting.      NEUROLOGICAL:  Negative for dizziness and headaches.      PSYCHIATRIC:  Negative for depression and suicidal thoughts.          Past Medical History / Family History / Social History:         Last Reviewed on 2020 04:32 PM by Magda Pat    Past Medical History:                 PAST MEDICAL HISTORY         Hyperlipidemia    Hypertension     Sleep Apnea: uses CPAP;     Type 2 Diabetes    Hypothyroidism     Depression    Anxiety         GYNECOLOGICAL HISTORY:        Menopause at age 45.          CURRENT MEDICAL PROVIDERS:    Sleep specialist - Dr. Bradley    Optometrist - Dr. George         PREVENTIVE HEALTH MAINTENANCE             BONE DENSITY: was last done 19 with the following abnormality noted-- Osteopenia     COLORECTAL CANCER SCREENING: Up to date (colonoscopy q10y; sigmoidoscopy q5y; Cologuard q3y) was last done 18; colonoscopy with normal results; Dr. Connelly     EYE EXAM: was last done 2018 The next one is due  appt scheduled 10/2020     MAMMOGRAM: Done within last 2 years and results in are chart was last done 19 with normal results     PAP SMEAR: was last done 17 with normal results         Surgical History:         Tubal Ligation         Family History:         Positive for Hypertension ( sister ).      Positive for Lung Cancer ( father ).          Social History:     Occupation: [employer]     Marital Status:      Children: 2 children         Tobacco/Alcohol/Supplements:     Last Reviewed on 2020 03:57 PM by Aleta Barlow    Tobacco: She has never smoked.      Caffeine:  She admits to consuming caffeine via coffee ( 2 servings per day ) and soda ( 3 servings per day ).          Substance Abuse History:     Last Reviewed on 2019 11:43 AM by Clementina Barrientos    NEGATIVE         Mental Health History:     Last Reviewed on  11/22/2019 11:43 AM by Clementina Barrientos        Communicable Diseases (eg STDs):     Last Reviewed on 11/22/2019 11:43 AM by Clementina Barrientos        Current Problems:     Last Reviewed on 7/08/2020 01:25 PM by Janki Loya    Essential (primary) hypertension    Mixed hyperlipidemia    Dysthymic disorder    Hypothyroidism, unspecified    Type 2 diabetes mellitus    Obstructive sleep apnea (adult) (pediatric)    Vitamin D deficiency, unspecified    Obesity, unspecified    Vitamin B deficiency, unspecified    Hypercalcemia    Other fecal abnormalities    Encounter for gynecological examination (general) (routine) without abnormal findings    Encounter for screening mammogram for malignant neoplasm of breast    Encounter for screening for other disorder    Menopausal and other perimenopausal disorders    Other specified disorders of bone density and structure    Other specified disorders of bone, other site    Chalazion left upper eyelid    Acute atopic conjunctivitis, left eye    Encounter for screening for depression    Otitis media, unspecified, bilateral    Low back pain    Pain in right leg    Pain in left leg    Pain in right knee    Encounter for other screening for malignant neoplasm of breast        Immunizations:     Hep A, ped/adol, 2 dose 1/1/2019    influenza, injectable, quadrivalent, preservative free 11/19/2019    Havrix -adult dose (HepA) 5/18/2018    Fluzone (3 + years dose) 10/8/2017    Fluzone Quadrivalent (3+ years) 10/1/2018    Influenza Virus Vaccine, unspecified formulation 10/1/2016    Adacel (Tdap) 7/19/2019        Allergies:     Last Reviewed on 9/21/2020 04:05 PM by Aleta Barlow    Novocain:          Current Medications:     Last Reviewed on 9/21/2020 03:57 PM by Aleta Barlow    Crestor 10 mg oral tablet [1 tab daily]    levothyroxine 50 mcg oral tablet [1 tab daily]    aspirin 81 mg oral tablet, delayed release (enteric coated) [Take 1 tablet(s) by mouth daily]    Lancet   Lancet [Check  blood one to two times daily as directed for ONE TOUCH ULTRA]    cholecalciferol (vitamin D3) 5,000 unit oral capsule [2 capsules QD]    multivitamin oral capsule [Take 1 capsule(s) by mouth daily]    Wellbutrin  mg oral tablet, sustained-release 12 hr [TAKE 1 TABLET TWICE A DAY]    Fish Oil 360-1,200 mg oral capsule [2 tabs daily]    Januvia 100 mg oral tablet [TAKE 1 TABLET DAILY]    hydroCHLOROthiazide 12.5 mg oral tablet [TAKE 1 TABLET DAILY]    metFORMIN 500 mg oral Tablet, Extended Release 24 hr [TAKE 2 TABLETS TWICE A DAY ]    losartan 50 mg oral tablet [TAKE 1 TABLET DAILY]    cetirizine 10 mg oral tablet [take 1 tablet (10 mg) by oral route once daily]    Calcium 600 600 mg calcium (1,500 mg) oral tablet [Take once daily with a meal]    diclofenac sodium 75 mg oral tablet, delayed release (enteric coated) [TAKE 1 TABLET TWICE A DAY]        Objective:        Vitals:         Current: 12/18/2020 1:43:33 PM    Ht:  4 ft, 11 in;  Wt: 219.2 lbs;  BMI: 44.3T: 96.4 F (oral);  BP: 123/59 mm Hg (right arm, sitting);  P: 80 bpm (right arm (BP Cuff), sitting);  sCr: 0.72 mg/dL;  GFR: 98.08        Exams:     PHYSICAL EXAM:     GENERAL: well developed, well nourished;  no apparent distress;     EYES: PERRL, EOMI     E/N/T: EARS: external auditory canal normal;  bilateral TMs are normal;  NOSE: normal turbinates; no sinus tenderness; OROPHARYNX: oral mucosa is normal; posterior pharynx shows no exudate;     NECK: range of motion is normal; trachea is midline;     RESPIRATORY: normal respiratory rate and pattern with no distress; normal breath sounds with no rales, rhonchi, wheezes or rubs;     CARDIOVASCULAR: normal rate; rhythm is regular;     GASTROINTESTINAL: nontender; normal bowel sounds; no organomegaly;     NEUROLOGIC: mental status: alert and oriented x 3; GROSSLY INTACT     PSYCHIATRIC: appropriate affect and demeanor;         Assessment:         Z00.00   Encounter for general adult medical examination  without abnormal findings       E11   Type 2 diabetes mellitus       I10   Essential (primary) hypertension       E78.2   Mixed hyperlipidemia       F34.1   Dysthymic disorder       E03.9   Hypothyroidism, unspecified       G47.33   Obstructive sleep apnea (adult) (pediatric)       Z13.31   Encounter for screening for depression           ORDERS:         Radiology/Test Orders:       3017F  Colorectal CA screen results documented and reviewed (PV)  (In-House)            2022F  Dilated retinal eye exam w/interpret by ophthalmologist/optometrist documented/reviewed (DM)4  (In-House)              Other Orders:         Screening mammogram results documented  (Send-Out)              Depression screen positive and follow up plan documented  (In-House)                      Plan:         Encounter for general adult medical examination without abnormal findingsUTD influenza and Tdap vaccine. Discussed recommendation for shingles vaccine. Will check with insurance coverage. UTD mammogram,  colonoscopy, bone density scan. UTD eye exam. Due for pap smear.        COUNSELING was provided today regarding the following topics: healthy eating habits, regular exercise, breast self-exam, and Advised to see eye doctor and dentist regularly..  MIPS Vaccines Flu and Pneumonia updated in Shot record Screening mammomgram done within last 2 years and results in are chart Colorectal Cancer Screening is up to date and the results are in the chart Diabetic Eye Exam during this calendar year and results are in chart           Orders:         Screening mammogram results documented  (Send-Out)            3017F  Colorectal CA screen results documented and reviewed (PV)  (In-House)            2022F  Dilated retinal eye exam w/interpret by ophthalmologist/optometrist documented/reviewed (DM)4  (In-House)              Type 2 diabetes mellitusWill follow up in 1-2 months for well woman exam and diabetic recheck. Continue lifestyle  modifications and current medications.        RECOMMENDATIONS given include: Healthy carb, high healthy protein and high fiber diet, Yearly eye exam., Regular exercise such as walking., Blood sugar checks., and Regular foot exams..          Essential (primary) hypertensionStable        Mixed hyperlipidemiaStable        Dysthymic disorderStable        Hypothyroidism, unspecifiedStable        Obstructive sleep apnea (adult) (pediatric)Compliant with CPAP        Encounter for screening for depression    MIPS PHQ-9 Depression Screening: Completed form scanned and in chart; Total Score 6 Positive Depression Screen: Stable on medications. No suicidal ideation.            Orders:         Depression screen positive and follow up plan documented  (In-House)                  Patient Recommendations:        For  Encounter for general adult medical examination without abnormal findings:        Limit dietary intake of fat (especially saturated fat) and cholesterol.  Eat a variety of foods, including plenty of fruits, vegetables, and grain containg fiber, limit fat intake to 30% of total calories. Balance caloric intake with energy expended.    Maintaining regular physical activity is advised to help prevent heart disease, hypertension, diabetes, and obesity.    You should regularly examine your breasts, easily done while in the shower or with lotion.  Feel and look for differences in consistency from month to month, especially noting knots or lumps, changes in skin appearance, nipple retraction or discharge.              Charge Capture:         Primary Diagnosis:     Z00.00  Encounter for general adult medical examination without abnormal findings           Orders:      14505  Preventive medicine, established patient, age 40-64 years  (In-House)            3017F  Colorectal CA screen results documented and reviewed (PV)  (In-House)            2022F  Dilated retinal eye exam w/interpret by ophthalmologist/optometrist  documented/reviewed (DM)4  (In-House)              E11  Type 2 diabetes mellitus     I10  Essential (primary) hypertension     E78.2  Mixed hyperlipidemia     F34.1  Dysthymic disorder     E03.9  Hypothyroidism, unspecified     G47.33  Obstructive sleep apnea (adult) (pediatric)     Z13.31  Encounter for screening for depression           Orders:        Depression screen positive and follow up plan documented  (In-House)

## 2021-05-18 NOTE — PROGRESS NOTES
Viky Swan 1965     Office/Outpatient Visit    Visit Date: Fri, Feb 15, 2019 02:14 pm    Provider: Clementina Barrientos N.P. (Assistant: Racquel Costello RN)    Location: [facility]        Electronically signed by Clementina Barrientos N.P. on  2019 03:58:59 PM                             SUBJECTIVE:        CC:     Ms. Swan is a 53 year old White female.  discuss lab results;         HPI:         Patient presents with type 2 diabetes.  Pt states not taking meds like she should and eating badly. She knows the changes she needs to make. Needing refills.          Concerning r otitis media,     states R ear pain x 2-3 days. No meds taken.      ROS:     CONSTITUTIONAL:  Negative for chills, fatigue, fever, and weight change.      EYES:  Negative for blurred vision.      CARDIOVASCULAR:  Negative for chest pain, orthopnea, paroxysmal nocturnal dyspnea and pedal edema.      RESPIRATORY:  Negative for dyspnea.      GASTROINTESTINAL:  Negative for abdominal pain, constipation, diarrhea, nausea and vomiting.      NEUROLOGICAL:  Negative for dizziness, headaches, paresthesias, and weakness.      PSYCHIATRIC:  Negative for anxiety, depression, and sleep disturbances.          [facility]/SH:     Last Reviewed on 2019 10:32 AM by Clementina Barrientos    Past Medical History:                 PAST MEDICAL HISTORY             GYNECOLOGICAL HISTORY:        Menopause at age 45.          PREVENTIVE HEALTH MAINTENANCE             COLORECTAL CANCER SCREENING: Up to date (colonoscopy q10y; sigmoidoscopy q5y; Cologuard q3y) was last done 18; colonoscopy with normal results     MAMMOGRAM: Done within last 2 years and results in are chart was last done 2017 with normal results         Surgical History:         Tubal Ligation         Family History:         Positive for Hypertension ( sister ).      Positive for Lung Cancer ( father ).          Social History:     Occupation: Homemaker     Marital  Status:      Children: 2 children         Tobacco/Alcohol/Supplements:     Last Reviewed on 1/21/2019 10:32 AM by Clementina Barrientos    Tobacco: She has never smoked.      Caffeine:  She admits to consuming caffeine via coffee ( 2 servings per day ) and soda ( 3 servings per day ).          Substance Abuse History:     Last Reviewed on 1/21/2019 10:32 AM by Clementina Barrientos    NEGATIVE         Mental Health History:     Last Reviewed on 1/21/2019 10:32 AM by Clementina Barrientos        Communicable Diseases (eg STDs):     Last Reviewed on 1/21/2019 10:32 AM by Clementina Barrientos            Current Problems:     Last Reviewed on 1/21/2019 10:32 AM by Clementina Barrientos    Back pain     History of noncompliance with medical treatment     Nonalcoholic fatty liver     Vitamin B12 deficiency     Obesity, NOS     Vitamin D deficiency, NOS     Lower back pain     Sleep apnea     Type 2 diabetes     Hypothyroidism     HTN     Hypercholesterolemia     Depression with anxiety     GERD         Immunizations:     Havrix -adult dose (HepA) 5/18/2018     Fluzone (3 + years dose) 10/8/2017     Influenza Virus Vaccine, unspecified formulation 10/1/2016         Allergies:     Last Reviewed on 1/21/2019 10:32 AM by Clementina Barrientos    Novocain:        Current Medications:     Last Reviewed on 1/21/2019 10:32 AM by Clementina Barrientos    Januvia 100mg Tablet Take 1 tablet(s) by mouth daily     Crestor 10mg Tablet 1 tab daily     Levothyroxine Sodium 0.05mg Tablet 1 tab daily     Wellbutrin SR 150mg Tablets, Sustained Release Take 1 tablet(s) by mouth bid     Lancet   Lancet Check blood one to two times daily as directed for ONE TOUCH ULTRA     Losartan/Hydrochlorothiazide 50mg/12.5mg Tablet Take 1 tablet(s) by mouth daily     Metformin HCl 1,000mg Tablets, Extended Release Take 1 tablet(s) by mouth twice daily     Aspirin (ASA) 81mg Tablets, Enteric Coated Take 1 tablet(s) by mouth daily     Omeprazole 40mg Capsules, Extended Release  1 capsule daily     Fish Oil 1,200mg Softgel capsule 2 tabs daily     Multivitamins Capsules Take 1 capsule(s) by mouth daily     Vitamin D3 5,000IU Capsules 2 capsules QD         OBJECTIVE:        Vitals:         Current: 2/15/2019 2:19:14 PM    Ht:  4 ft, 11 in;  Wt: 223 lbs;  BMI: 45.0    T: 98.2 F (oral);  BP: 133/64 mm Hg (right arm, sitting);  P: 85 bpm (right arm (BP Cuff), sitting);  sCr: 0.6 mg/dL;  GFR: 121.24        Exams:     PHYSICAL EXAM:     GENERAL: vital signs recorded - well developed, well nourished;  no apparent distress;     EYES: extraocular movements intact; conjunctiva and cornea are normal; PERRLA;     E/N/T: EARS: external auditory canal erythematous on the right;  NOSE:  normal nasal mucosa, septum, turbinates, and sinuses; OROPHARYNX:  normal mucosa, dentition, gingiva, and posterior pharynx;     NECK: range of motion is normal; thyroid is non-palpable;     RESPIRATORY: normal respiratory rate and pattern with no distress; normal breath sounds with no rales, rhonchi, wheezes or rubs;     CARDIOVASCULAR: normal rate; rhythm is regular;  no systolic murmur; no edema;     GASTROINTESTINAL: nontender; normal bowel sounds; no organomegaly;     NEUROLOGICAL:  cranial nerves, motor and sensory function, reflexes, gait and coordination are all intact;     PSYCHIATRIC:  appropriate affect and demeanor; normal speech pattern; grossly normal memory;         Lab/Test Results:             Hemoglobin A1c:  7.8 (%) (10/03/2018),  8.1 (%) (02/04/2019),             ASSESSMENT:           250.00   E13.9  Type 2 diabetes              DDx:     382.00   H66.91  R otitis media              DDx:         ORDERS:         Meds Prescribed:       Refill of: Januvia (Sitagliptin Phosphate) 100mg Tablet Take 1 tablet(s) by mouth daily  #7 (Seven) tablet(s) Refills: 0       Refill of: Metformin HCl 1,000mg Tablets, Extended Release Take one PO BID  #60 (Sixty) tablet(s) Refills: 1       Cefdinir (Cefdinir)  300mg  Capsules 1 bid for 14 days  #28 (Twenty Eight) capsule(s) Refills: 0         Lab Orders:       APPTO  Appointment need  (In-House)                   PLAN:          Type 2 diabetes Dietary changes.         FOLLOW-UP: Schedule a follow-up visit in 3 months..            Prescriptions:       Refill of: Januvia (Sitagliptin Phosphate) 100mg Tablet Take 1 tablet(s) by mouth daily  #7 (Seven) tablet(s) Refills: 0       Refill of: Metformin HCl 1,000mg Tablets, Extended Release Take one PO BID  #60 (Sixty) tablet(s) Refills: 1           Orders:       APPTO  Appointment need  (In-House)            R otitis media           Prescriptions:       Cefdinir (Cefdinir)  300mg Capsules 1 bid for 14 days  #28 (Twenty Eight) capsule(s) Refills: 0           Patient Education Handouts:       Acute Ear Infection (Otitis Media)              Patient Recommendations:        For  Type 2 diabetes:     Schedule a follow-up visit in 3 months.                APPOINTMENT INFORMATION:        Monday Tuesday Wednesday Thursday Friday Saturday Sunday            Time:___________________AM  PM   Date:_____________________             CHARGE CAPTURE:           Primary Diagnosis:     250.00 Type 2 diabetes            E13.9    Other specified diabetes mellitus without complications              Orders:          [id number]   Office/outpatient visit; established patient, level 4  (In-House)             APPTO   Appointment need  (In-House)           382.00 R otitis media            H66.91    Otitis media, unspecified, right ear

## 2021-05-18 NOTE — PROGRESS NOTES
Viky Swan  1965     Office/Outpatient Visit    Visit Date: Mon, Sep 21, 2020 03:57 pm    Provider: Magda Pat N.P. (Assistant: Aleta Barlow, )    Location: [facility]        Electronically signed by Magda Pat N.P. on  09/21/2020 05:37:41 PM                             Subjective:        CC: Ms. Swan is a 55 year old White female.  6 month follow up, leg pain;         HPI: Viky is following up on her chronic issues today.    She has history of Type 2 diabetes. Last HgbA1C was 7.1  3/2020 . Currently taking Metformin and Januvia. She is on ARB, statin, and baby Aspirin as well. Due for eye exam. Last was 6/2018. She is scheduled next month with Dr George.     On Levothyroxine for hypothyroidism. TSH last checked 3/2020 and was normal.    On Losartan/HCTZ for HTN. Doing well.     On Crestor and fish oil for mixed hyperlipidemia.     AKIN diagnosed in the past couple of years. She is compliant with CPAP machine and has noticed improvement in her sleep.    History of depression with anxiety. Doing well. Has been taking Wellbutrin for at least 5 years. Denies suicidal ideation.    Osteopenia noted on bone density scan. She is taking OTC Vitamin D. Not currently taking calcium supplement. She does try to eat calcium in her diet.    She c/o right sided leg pain. She has had some low back pain with xray showing degenerative changes and spurring. Has completed physical therapy. Did note some improvement with Diclofenac. She reports pain seems to be more in her knee now. Stands for long hours at work and does heavy lifting.    ROS:     CONSTITUTIONAL:  Negative for chills and fever.      CARDIOVASCULAR:  Negative for chest pain and palpitations.      RESPIRATORY:  Negative for dyspnea and frequent wheezing.      GASTROINTESTINAL:  Negative for abdominal pain and vomiting.      MUSCULOSKELETAL:  Positive for back pain and right knee pain.      INTEGUMENTARY/BREAST:  Negative for rash.       NEUROLOGICAL:  Negative for dizziness and headaches.      PSYCHIATRIC:  Negative for depression and suicidal thoughts.          Past Medical History / Family History / Social History:         Last Reviewed on 2020 04:32 PM by Magda Pat    Past Medical History:                 PAST MEDICAL HISTORY         Hyperlipidemia    Hypertension     Sleep Apnea: uses CPAP;     Type 2 Diabetes    Hypothyroidism     Depression    Anxiety         GYNECOLOGICAL HISTORY:        Menopause at age 45.          CURRENT MEDICAL PROVIDERS:    Sleep specialist - Dr. Bradley    Optometrist - Dr. George         PREVENTIVE HEALTH MAINTENANCE             BONE DENSITY: was last done 19 with the following abnormality noted-- Osteopenia     COLORECTAL CANCER SCREENING: Up to date (colonoscopy q10y; sigmoidoscopy q5y; Cologuard q3y) was last done 18; colonoscopy with normal results; Dr. Connelly     EYE EXAM: was last done 2018 The next one is due  now     MAMMOGRAM: Done within last 2 years and results in are chart was last done 19 with normal results     PAP SMEAR: was last done 17 with normal results         Surgical History:         Tubal Ligation         Family History:         Positive for Hypertension ( sister ).      Positive for Lung Cancer ( father ).          Social History:     Occupation: [employer]     Marital Status:      Children: 2 children         Tobacco/Alcohol/Supplements:     Last Reviewed on 2020 03:57 PM by Aleta Barlow    Tobacco: She has never smoked.      Caffeine:  She admits to consuming caffeine via coffee ( 2 servings per day ) and soda ( 3 servings per day ).          Substance Abuse History:     Last Reviewed on 2019 11:43 AM by Clementina Barrientos    NEGATIVE         Mental Health History:     Last Reviewed on 2019 11:43 AM by Clementina Barrientos        Communicable Diseases (eg STDs):     Last Reviewed on 2019 11:43 AM by Clementina Barrientos         Current Problems:     Last Reviewed on 7/08/2020 01:25 PM by Janki Loya    Essential (primary) hypertension    Mixed hyperlipidemia    Dysthymic disorder    Hypothyroidism, unspecified    Type 2 diabetes mellitus    Obstructive sleep apnea (adult) (pediatric)    Vitamin D deficiency, unspecified    Obesity, unspecified    Vitamin B deficiency, unspecified    Hypercalcemia    Other fecal abnormalities    Encounter for gynecological examination (general) (routine) without abnormal findings    Encounter for screening mammogram for malignant neoplasm of breast    Encounter for screening for other disorder    Menopausal and other perimenopausal disorders    Other specified disorders of bone density and structure    Other specified disorders of bone, other site    Chalazion left upper eyelid    Acute atopic conjunctivitis, left eye    Encounter for screening for depression    Otitis media, unspecified, bilateral    Low back pain    Pain in right leg    Pain in left leg        Immunizations:     Hep A, ped/adol, 2 dose 1/1/2019    influenza, injectable, quadrivalent, preservative free 11/19/2019    Havrix -adult dose (HepA) 5/18/2018    Fluzone (3 + years dose) 10/8/2017    Fluzone Quadrivalent (3+ years) 10/1/2018    Influenza Virus Vaccine, unspecified formulation 10/1/2016    Adacel (Tdap) 7/19/2019        Allergies:     Last Reviewed on 9/21/2020 04:05 PM by Aleta Barlow    Novocain:          Current Medications:     Last Reviewed on 9/21/2020 03:57 PM by Aleta Barlow    Crestor 10 mg oral tablet [1 tab daily]    levothyroxine 50 mcg oral tablet [1 tab daily]    aspirin 81 mg oral tablet, delayed release (enteric coated) [Take 1 tablet(s) by mouth daily]    Lancet   Lancet [Check blood one to two times daily as directed for ONE TOUCH ULTRA]    cholecalciferol (vitamin D3) 5,000 unit oral capsule [2 capsules QD]    multivitamin oral capsule [Take 1 capsule(s) by mouth daily]    Wellbutrin  mg oral tablet,  sustained-release 12 hr [TAKE 1 TABLET TWICE A DAY]    Fish Oil 360-1,200 mg oral capsule [2 tabs daily]    Januvia 100 mg oral tablet [TAKE 1 TABLET DAILY]    hydroCHLOROthiazide 12.5 mg oral tablet [TAKE 1 TABLET DAILY]    metFORMIN 500 mg oral Tablet, Extended Release 24 hr [TAKE 2 TABLETS TWICE A DAY (MUST BE SEEN FOR REFILLS)]    losartan 50 mg oral tablet [TAKE 1 TABLET DAILY]    cetirizine 10 mg oral tablet [take 1 tablet (10 mg) by oral route once daily]    Calcium 600 600 mg calcium (1,500 mg) oral tablet [Take once daily with a meal]    diclofenac sodium 75 mg oral tablet, delayed release (enteric coated) [take 1 tablet (75 mg) by oral route 2 times per day]        Objective:        Vitals:         Historical:     7/8/2020  BP:   129/58 mm Hg ( (left arm, , sitting, );) 7/8/2020  P:   88bpm ( (left arm (BP Cuff), , sitting, );) 7/8/2020  Wt:   368doa3/13/2020  Wt:   218.8lbs    Current: 9/21/2020 4:04:04 PM    Ht:  4 ft, 11 in;  Wt: 221.4 lbs;  BMI: 44.7T: 96.8 F (oral);  BP: 137/67 mm Hg (right arm, sitting);  P: 98 bpm (right arm (BP Cuff), sitting);  sCr: 0.7 mg/dL;  GFR: 101.31        Exams:     PHYSICAL EXAM:     GENERAL: well developed, well nourished;  no apparent distress;     RESPIRATORY: normal respiratory rate and pattern with no distress; normal breath sounds with no rales, rhonchi, wheezes or rubs;     CARDIOVASCULAR: normal rate; rhythm is regular;     MUSCULOSKELETAL: normal gait; normal range of motion of all major muscle groups; no limb or joint pain with range of motion;     NEUROLOGIC: mental status: alert and oriented x 3; GROSSLY INTACT     PSYCHIATRIC: appropriate affect and demeanor;     Left foot exam    Protective sensation using Monofilament test: NORMAL sensation. Patient detects .07 grams of force which is considered normal.    Vascular status: normal peripheral vascular exam with palpable dorsal pedal and posterior tibal pulses and brisk digital capillary refill    Skin is  intact without sores or ulcers    Right foot exam    Protective sensation using Monofilament test: NORMAL sensation. Patient detects .07 grams of force which is considered normal.    Vascular status: normal peripheral vascular exam with palpable dorsal pedal and posterior tibal pulses and brisk digital capillary refill    Skin is intact without sores or ulcers         Assessment:         E11   Type 2 diabetes mellitus       I10   Essential (primary) hypertension       E78.2   Mixed hyperlipidemia       F34.1   Dysthymic disorder       E03.9   Hypothyroidism, unspecified       G47.33   Obstructive sleep apnea (adult) (pediatric)       M25.561   Pain in right knee       M85.8   Other specified disorders of bone density and structure           ORDERS:         Meds Prescribed:       [Refilled] levothyroxine 50 mcg oral tablet [1 tab daily], #90 (ninety) tablets, Refills: 1 (one)       [Refilled] Crestor 10 mg oral tablet [1 tab daily], #90 (ninety) tablets, Refills: 1 (one)       [Refilled] diclofenac sodium 75 mg oral tablet, delayed release (enteric coated) [take 1 tablet (75 mg) by oral route 2 times per day], #120 (one hundred and twenty) tablets, Refills: 0 (zero)       [Refilled] Januvia 100 mg oral tablet [TAKE 1 TABLET DAILY], #90 (ninety) tablets, Refills: 1 (one)       [Refilled] metFORMIN 500 mg oral Tablet, Extended Release 24 hr [TAKE 2 TABLETS TWICE A DAY ], #360 (three hundred and sixty) tablets, Refills: 1 (one)       [Refilled] hydroCHLOROthiazide 12.5 mg oral tablet [TAKE 1 TABLET DAILY], #90 (ninety) tablets, Refills: 1 (one)       [Refilled] losartan 50 mg oral tablet [TAKE 1 TABLET DAILY], #90 (ninety) tablets, Refills: 1 (one)       [Refilled] Wellbutrin  mg oral tablet, sustained-release 12 hr [TAKE 1 TABLET TWICE A DAY], #180 (one hundred and eighty) tablets, Refills: 1 (one)       [Refilled] Calcium 600 600 mg calcium (1,500 mg) oral tablet [Take once daily with a meal], #90 (ninety)  tablets, Refills: 1 (one)         Radiology/Test Orders:       [id number]  Right radiologic examination, knee; three views  (Send-Out)            3017F  Colorectal CA screen results documented and reviewed (PV)  (In-House)              Lab Orders:       [id number]  [address] CMP A1C LIPID AND MICRO ALBUM CR RATIO: [id number]  (Send-Out)            [id number]  [facility] TSH  (Send-Out)            APPTO  Appointment need  (In-House)              Other Orders:         Screening mammogram results documented  (Send-Out)            2028F  Foot examination performed (includes examination through visual inspection, sensory exam with monofilament, and pulse exam - report when any of the three components are completed) (DM)4  (In-House)                      Plan:         Type 2 diabetes mellitus Healthy carb, high healthy protein and high fiber diet, Yearly eye exam., Regular exercise such as walking., Blood sugar checks., and Regular foot exams.          LABORATORY:  Labs ordered to be performed today include Diabetes Panel 2;CMP, A1C, Lipid, Microalbumin:Creatinine Ratio.  MIPS Vaccines Flu and Pneumonia updated in Shot record Screening mammomgram done within last 2 years and results in are chart Colorectal Cancer Screening is up to date and the results are in the chart     FOLLOW-UP: Schedule a follow-up visit in 6 months.            Prescriptions:       [Refilled] Januvia 100 mg oral tablet [TAKE 1 TABLET DAILY], #90 (ninety) tablets, Refills: 1 (one)       [Refilled] metFORMIN 500 mg oral Tablet, Extended Release 24 hr [TAKE 2 TABLETS TWICE A DAY ], #360 (three hundred and sixty) tablets, Refills: 1 (one)           Orders:       [id number]  [facility] [place] [address] CMP A1C LIPID AND MICRO ALBUM CR RATIO: [id number]  (Send-Out)            APPTO  Appointment need  (In-House)              Screening mammogram results documented  (Send-Out)            3017F  Colorectal CA screen results documented and  reviewed (PV)  (In-House)              Essential (primary) hypertensionStable          Prescriptions:       [Refilled] hydroCHLOROthiazide 12.5 mg oral tablet [TAKE 1 TABLET DAILY], #90 (ninety) tablets, Refills: 1 (one)       [Refilled] losartan 50 mg oral tablet [TAKE 1 TABLET DAILY], #90 (ninety) tablets, Refills: 1 (one)         Mixed hyperlipidemiaChecking labs as above          Prescriptions:       [Refilled] Crestor 10 mg oral tablet [1 tab daily], #90 (ninety) tablets, Refills: 1 (one)         Dysthymic disorderStable          Prescriptions:       [Refilled] Wellbutrin  mg oral tablet, sustained-release 12 hr [TAKE 1 TABLET TWICE A DAY], #180 (one hundred and eighty) tablets, Refills: 1 (one)         Hypothyroidism, unspecified    LABORATORY:  Labs ordered to be performed today include TSH.            Prescriptions:       [Refilled] levothyroxine 50 mcg oral tablet [1 tab daily], #90 (ninety) tablets, Refills: 1 (one)           Orders:       [id number]  [facility] TSH  (Send-Out)              Obstructive sleep apnea (adult) (pediatric)Compliant with CPAP        Pain in right kneeMay have work note for no lifting greater than 20 pounds.        RADIOLOGY:  I have ordered a right knee x-ray to be done today.      RECOMMENDATIONS given include: Further recommendation to be given after test results are complete.            Prescriptions:       [Refilled] diclofenac sodium 75 mg oral tablet, delayed release (enteric coated) [take 1 tablet (75 mg) by oral route 2 times per day], #120 (one hundred and twenty) tablets, Refills: 0 (zero)           Orders:       [id number]  Right radiologic examination, knee; three views  (Send-Out)              Other specified disorders of bone density and structureOn Vitamin D as well.           Prescriptions:       [Refilled] Calcium 600 600 mg calcium (1,500 mg) oral tablet [Take once daily with a meal], #90 (ninety) tablets, Refills: 1 (one)             Other Orders      2028F  Foot  examination performed (includes examination through visual inspection, sensory exam with monofilament, and pulse exam - report when any of the three components are completed) (DM)4  (In-House)              Patient Recommendations:        For  Type 2 diabetes mellitus:    Schedule a follow-up visit in 6 months.              Charge Capture:         Primary Diagnosis:     E11  Type 2 diabetes mellitus           Orders:      [id number]  Office/outpatient visit; established patient, level 4  (In-House)            APPTO  Appointment need  (In-House)            3017F  Colorectal CA screen results documented and reviewed (PV)  (In-House)              I10  Essential (primary) hypertension     E78.2  Mixed hyperlipidemia     F34.1  Dysthymic disorder     E03.9  Hypothyroidism, unspecified     G47.33  Obstructive sleep apnea (adult) (pediatric)     M25.561  Pain in right knee     M85.8  Other specified disorders of bone density and structure         Other Orders:       2028F  Foot examination performed (includes examination through visual inspection, sensory exam with monofilament, and pulse exam - report when any of the three components are completed) (DM)4  (In-House)

## 2021-05-18 NOTE — PROGRESS NOTES
Viky Swan 1965     Office/Outpatient Visit    Visit Date: Thu, Jb 10, 2019 03:16 pm    Provider: Clementina Barrientos N.P. (Assistant: Kilo Hughes)    Location: [facility]        Electronically signed by Clementina Barrientos N.P. on  2019 09:56:55 AM                             SUBJECTIVE:        CC: Pt also seen by HIRAL Mayer, NP Student     Ms. Swan is a 53 year old White female.  presents today due to cough, congestion, chills         HPI:         Patient to be evaluated for acute upper respiratory infection of multiple sites.  Pt c/o dry cough, chills, shortness of breath at night, no fever X 4 days.  Reports she has been using her inhaler and Coricidin HBR without much relief.          In regard to the hypercholesterolemia, patient reports taking RX daily as prescribed.  Requesting refill today         In regard to the shoulder pain, pt c/o R shoulder pain;  States constant pain that is relieved when she holds her arm in front of her with support;  Reports pain with ROM;     ROS:     CONSTITUTIONAL:  Positive for chills and fatigue.   Negative for fever.      EYES:  Negative for blurred vision.      E/N/T:  Positive for ear pain and sore throat ( acute ).   Negative for sinus pressure.      CARDIOVASCULAR:  Negative for chest pain, orthopnea, paroxysmal nocturnal dyspnea and pedal edema.      RESPIRATORY:  Negative for dyspnea.      GASTROINTESTINAL:  Negative for abdominal pain, constipation, diarrhea, nausea and vomiting.      MUSCULOSKELETAL:  Positive for arthralgias, myalgias and R shoulder pain.      NEUROLOGICAL:  Negative for dizziness, headaches, paresthesias, and weakness.      PSYCHIATRIC:  Negative for anxiety, depression, and sleep disturbances.          PMH/FMH/SH:     Last Reviewed on 1/10/2019 11:12 PM by Clementina Barrientos    Past Medical History:                 PAST MEDICAL HISTORY             GYNECOLOGICAL HISTORY:        Menopause at age 45.           PREVENTIVE HEALTH MAINTENANCE             COLORECTAL CANCER SCREENING: Up to date (colonoscopy q10y; sigmoidoscopy q5y; Cologuard q3y) was last done 1-23-18; colonoscopy with normal results     MAMMOGRAM: Done within last 2 years and results in are chart was last done 11/2017 with normal results         Surgical History:         Tubal Ligation         Family History:         Positive for Hypertension ( sister ).      Positive for Lung Cancer ( father ).          Social History:     Occupation: Homemaker     Marital Status:      Children: 2 children         Tobacco/Alcohol/Supplements:     Last Reviewed on 1/10/2019 11:12 PM by Clementina Barrientos    Tobacco: She has never smoked.      Caffeine:  She admits to consuming caffeine via coffee ( 2 servings per day ) and soda ( 3 servings per day ).          Substance Abuse History:     Last Reviewed on 1/10/2019 11:12 PM by Clementina Barrientos    NEGATIVE         Mental Health History:     Last Reviewed on 1/10/2019 11:12 PM by Clementina Barrientos        Communicable Diseases (eg STDs):     Last Reviewed on 1/10/2019 11:12 PM by Clementina Barrientos            Current Problems:     Last Reviewed on 1/10/2019 11:12 PM by Clementina Barrientos    Back pain     History of noncompliance with medical treatment     Nonalcoholic fatty liver     Vitamin B12 deficiency     Obesity, NOS     Vitamin D deficiency, NOS     Lower back pain     Sleep apnea     Diffuse arthralgia     Type 2 diabetes     Hypothyroidism     HTN     Hypercholesterolemia     Depression with anxiety     GERD     Acute serous otitis media     Shoulder pain     Acute upper respiratory infection of multiple sites         Immunizations:     Havrix -adult dose (HepA) 5/18/2018     Fluzone (3 + years dose) 10/8/2017     Influenza Virus Vaccine, unspecified formulation 10/1/2016         Allergies:     Last Reviewed on 1/10/2019 11:12 PM by Clementina Barrientos    Novocain:        Current Medications:     Last Reviewed on  1/10/2019 11:12 PM by Clementina Barrientos    Metformin HCl 1,000mg Tablets, Extended Release Take 1 tablet(s) by mouth twice daily     Januvia 100mg Tablet Take 1 tablet(s) by mouth daily     Crestor 10mg Tablet 1 tab daily     Levothyroxine Sodium 0.05mg Tablet 1 tab daily     Losartan/Hydrochlorothiazide 50mg/12.5mg Tablet Take 1 tablet(s) by mouth daily     Wellbutrin SR 150mg Tablets, Sustained Release Take 1 tablet(s) by mouth bid     Lancet   Lancet Check blood one to two times daily as directed for ONE TOUCH ULTRA     Aspirin (ASA) 81mg Tablets, Enteric Coated Take 1 tablet(s) by mouth daily     Fish Oil 1,200mg Softgel capsule 2 tabs daily     Multivitamins Capsules Take 1 capsule(s) by mouth daily     Vitamin D3 5,000IU Capsules 2 capsules QD         OBJECTIVE:        Vitals:         Current: 1/10/2019 3:22:39 PM    Ht:  4 ft, 11 in;  Wt: 230.2 lbs;  BMI: 46.5    T: 98.4 F (oral);  BP: 137/72 mm Hg (right arm, sitting);  P: 83 bpm (right arm (BP Cuff), sitting);  sCr: 0.53 mg/dL;  GFR: 139.12        Exams:     PHYSICAL EXAM:     GENERAL: vital signs recorded - well developed, well nourished;  no apparent distress;     EYES: extraocular movements intact; conjunctiva and cornea are normal; PERRLA;     E/N/T: EARS: external auditory canal erythematous bilaterally;  the right TM is yellow;  OROPHARYNX: posterior pharynx shows a posterior pharyngeal exudate, erythematous anterior tonsillar pillars, and erythema;     NECK: range of motion is normal; thyroid is non-palpable;     RESPIRATORY: normal respiratory rate and pattern with no distress; normal breath sounds with no rales, rhonchi, wheezes or rubs;     CARDIOVASCULAR: normal rate; rhythm is regular;  no systolic murmur; no edema;     GASTROINTESTINAL: nontender; normal bowel sounds; no organomegaly;     MUSCULOSKELETAL: R shoulder tenderness, limited ROM.;     NEUROLOGICAL:  cranial nerves, motor and sensory function, reflexes, gait and coordination are all  intact;     PSYCHIATRIC:  appropriate affect and demeanor; normal speech pattern; grossly normal memory;         Lab/Test Results:             Influenza A and B:  Negative (01/10/2019),     Rapid Strep Screen:  Negative (01/10/2019),     Performed by::  samir (01/10/2019),             ASSESSMENT:           465.8   J06.9  Acute upper respiratory infection of multiple sites              DDx:     272.0   E78.2  Hypercholesterolemia              DDx:     719.41   M25.511  Shoulder pain              DDx:     381.01   H65.01   H65.02  Acute serous otitis media              DDx:         ORDERS:         Meds Prescribed:       Refill of: Crestor (Rosuvastatin) 10mg Tablet 1 tab daily  #90 (Ninety) tablet(s) Refills: 1       Medrol (Methylprednisolone) 4mg Dosepak Take as directed with food  #1 (One) dose pack Refills: 0       Cyclobenzaprine HCl 10mg Tablet 1 tablet AT HS PRN  #20 (Twenty) tablet(s) Refills: 0       Amoxicillin 875mg Tablet One PO BID X 10 days.  #20 (Twenty) tablet(s) Refills: 0         Lab Orders:       [id number]  Infectious agent antigen detection by immunoassay; Influenza  (In-House)         54075-99  Infectious agent antigen detection by immunoassay; Influenza  (In-House)         73920  [facility]ST - [facility] Rapid strep A  (In-House)         99179  [facility] Throat culture, strep  (Send-Out)                   PLAN:          Acute upper respiratory infection of multiple sites     LABORATORY:  Labs ordered to be performed today include rapid strep test.      FOLLOW-UP: Advised to call if there is no improvement..            Orders:       [id number]  Infectious agent antigen detection by immunoassay; Influenza  (In-House)         57119-94  Infectious agent antigen detection by immunoassay; Influenza  (In-House)         28161  BDSTR - [facility] Rapid strep A  (In-House)         65910  [facility] Throat culture, strep  (Send-Out)            Hypercholesterolemia           Prescriptions:       Refill of: Crestor (Rosuvastatin) 10mg  Tablet 1 tab daily  #90 (Ninety) tablet(s) Refills: 1           Patient Education Handouts:       Hyperlipidemia (Hyperlipoproteinemia)           Shoulder pain Will try meds first. Pt wants to f/u if no relief and will consider Xray, PT, or MRI.     School/Work Excuse for Today Tomorrow           Prescriptions:       Medrol (Methylprednisolone) 4mg Dosepak Take as directed with food  #1 (One) dose pack Refills: 0       Cyclobenzaprine HCl 10mg Tablet 1 tablet AT HS PRN  #20 (Twenty) tablet(s) Refills: 0          Acute serous otitis media           Prescriptions:       Amoxicillin 875mg Tablet One PO BID X 10 days.  #20 (Twenty) tablet(s) Refills: 0             Patient Recommendations:        For  Acute upper respiratory infection of multiple sites:                     APPOINTMENT INFORMATION:        Monday Tuesday Wednesday Thursday Friday Saturday Sunday            Time:___________________AM  PM   Date:_____________________             CHARGE CAPTURE:           Primary Diagnosis:     465.8 Acute upper respiratory infection of multiple sites            J06.9    Acute upper respiratory infection, unspecified              Orders:          64517   Office/outpatient visit; established patient, level 4  (In-House)             05120   Infectious agent antigen detection by immunoassay; Influenza  (In-House)             53374 -59  Infectious agent antigen detection by immunoassay; Influenza  (In-House)             14915   ST - [facility] Rapid strep A  (In-House)           272.0 Hypercholesterolemia            E78.2    Mixed hyperlipidemia    719.41 Shoulder pain            M25.511    Pain in right shoulder    381.01 Acute serous otitis media            H65.01    Acute serous otitis media, right ear           H65.02    Acute serous otitis media, left ear

## 2021-05-18 NOTE — PROGRESS NOTES
Viky Swan 1965     Office/Outpatient Visit    Visit Date: Wed, Sep 25, 2019 02:42 pm    Provider: Clementina Barrientos N.P. (Assistant: Racquel Costello RN)    Location: [facility]        Electronically signed by Clementina Barrientos N.P. on  2019 07:37:25 PM                             SUBJECTIVE:        CC:     Ms. Swan is a 54 year old White female.  congestion, cough;         HPI:         PHQ-9 Depression Screening: Completed form scanned and in chart; Total Score 5         In regard to the cough, pt states productive cough, she did not examine. States  sore throat, ear pain, headaches. Denies fever but has been sweating.  Taking claritin with little relief. States symptoms x 3-4 days.     ROS:     CONSTITUTIONAL:  Negative for chills, fatigue, fever, and weight change.      EYES:  Negative for blurred vision.      CARDIOVASCULAR:  Negative for chest pain, orthopnea, paroxysmal nocturnal dyspnea and pedal edema.      RESPIRATORY:  Positive for recent cough.   Negative for dyspnea.      GASTROINTESTINAL:  Negative for abdominal pain, constipation, diarrhea, nausea and vomiting.      NEUROLOGICAL:  Negative for dizziness, headaches, paresthesias, and weakness.      PSYCHIATRIC:  Negative for anxiety, depression, and sleep disturbances.          PMH/FMH/SH:     Last Reviewed on 2019 12:15 PM by Clementina Barrientos    Past Medical History:                 PAST MEDICAL HISTORY             GYNECOLOGICAL HISTORY:        Menopause at age 45.          PREVENTIVE HEALTH MAINTENANCE             COLORECTAL CANCER SCREENING: Up to date (colonoscopy q10y; sigmoidoscopy q5y; Cologuard q3y) was last done 18; colonoscopy with normal results     MAMMOGRAM: Done within last 2 years and results in are chart was last done 2017 with normal results         Surgical History:         Tubal Ligation         Family History:         Positive for Hypertension ( sister ).      Positive for Lung  Cancer ( father ).          Social History:     Occupation: Homemaker     Marital Status:      Children: 2 children         Tobacco/Alcohol/Supplements:     Last Reviewed on 6/04/2019 12:15 PM by Clementina Barrientos    Tobacco: She has never smoked.      Caffeine:  She admits to consuming caffeine via coffee ( 2 servings per day ) and soda ( 3 servings per day ).          Substance Abuse History:     Last Reviewed on 6/04/2019 12:15 PM by Clementina Barrientos    NEGATIVE         Mental Health History:     Last Reviewed on 6/04/2019 12:15 PM by Clementina Barrientos        Communicable Diseases (eg STDs):     Last Reviewed on 6/04/2019 12:15 PM by Clementina Barrientos            Current Problems:     Last Reviewed on 6/04/2019 12:15 PM by Clementina Barrientos    Hypercalcemia     History of noncompliance with medical treatment     Nonalcoholic fatty liver     Vitamin B12 deficiency     Obesity, NOS     Vitamin D deficiency, NOS     Lower back pain     Sleep apnea     Type 2 diabetes     HTN     Hypothyroidism     Hypercholesterolemia     Depression with anxiety     GERD         Immunizations:     Havrix -adult dose (HepA) 5/18/2018     Fluzone (3 + years dose) 10/8/2017     Fluzone Quadrivalent (3+ years) 10/1/2018     Influenza Virus Vaccine, unspecified formulation 10/1/2016         Allergies:     Last Reviewed on 9/25/2019 02:46 PM by Racquel Costello    Novocain:        Current Medications:     Last Reviewed on 9/25/2019 02:47 PM by Racquel Costello    Wellbutrin SR 150mg Tablets, Sustained Release Take 1 tablet(s) by mouth bid     Metformin HCl 1,000mg Tablets, Extended Release Take one PO BID     Crestor 10mg Tablet 1 tab daily     Januvia 100mg Tablet Take 1 tablet(s) by mouth daily     Lancet   Lancet Check blood one to two times daily as directed for ONE TOUCH ULTRA     Levothyroxine Sodium 0.05mg Tablet 1 tab daily     Losartan/Hydrochlorothiazide 50mg/12.5mg Tablet Take 1 tablet(s) by mouth daily     Aspirin (ASA)  81mg Tablets, Enteric Coated Take 1 tablet(s) by mouth daily     Fish Oil 1,200mg Softgel capsule 2 tabs daily     Multivitamins Capsules Take 1 capsule(s) by mouth daily     Vitamin D3 5,000IU Capsules 2 capsules QD         OBJECTIVE:        Vitals:         Current: 9/25/2019 2:45:47 PM    Ht:  4 ft, 11 in;  Wt: 223.6 lbs;  BMI: 45.2    T: 98.5 F (oral);  BP: 124/55 mm Hg (right arm, sitting);  P: 85 bpm (right arm (BP Cuff), sitting);  sCr: 0.57 mg/dL;  GFR: 126.35        Exams:     PHYSICAL EXAM:     GENERAL: vital signs recorded - well developed, well nourished;  no apparent distress;     EYES: extraocular movements intact; conjunctiva and cornea are normal; PERRLA;     E/N/T: EARS:  normal external auditory canals and tympanic membranes;  grossly normal hearing; NOSE: nasal mucosa is erythematous;  bilateral maxillary and bilateral frontal sinus tenderness present; OROPHARYNX: oral mucosa reveals erythema;     NECK: range of motion is normal; thyroid is non-palpable;     RESPIRATORY: normal respiratory rate and pattern with no distress; normal breath sounds with no rales, rhonchi, wheezes or rubs;     CARDIOVASCULAR: normal rate; rhythm is regular;  no systolic murmur; no edema;     GASTROINTESTINAL: nontender; normal bowel sounds; no organomegaly;     NEUROLOGICAL:  cranial nerves, motor and sensory function, reflexes, gait and coordination are all intact;     PSYCHIATRIC:  appropriate affect and demeanor; normal speech pattern; grossly normal memory;         Lab/Test Results:             Influenza A and B:  Negative (09/25/2019),     Performed by::  radha (09/25/2019),             ASSESSMENT:           V79.0   Z13.89  Screening for depression              DDx:     466.0   J20.9  Acute bronchitis              DDx:     780.79   R53.83  Fatigue              DDx:         ORDERS:         Meds Prescribed:       Prednisone 20mg Tablet 1 daily for 5 days  #5 (Five) tablet(s) Refills: 0       Augmentin  (Amoxicillin/Clavulanate)  875mg/125mg Tablet 1 tab bid X 10 days  #20 (Twenty) tablet(s) Refills: 0         Lab Orders:       [id number]  Infectious agent antigen detection by immunoassay; Influenza  (In-House)         24988  Infectious agent antigen detection by immunoassay; Influenza  (In-House)                   PLAN:          Acute bronchitis           Prescriptions:       Prednisone 20mg Tablet 1 daily for 5 days  #5 (Five) tablet(s) Refills: 0       Augmentin (Amoxicillin/Clavulanate)  875mg/125mg Tablet 1 tab bid X 10 days  #20 (Twenty) tablet(s) Refills: 0          Fatigue           Orders:       [id number]  Infectious agent antigen detection by immunoassay; Influenza  (In-House)         60820  Infectious agent antigen detection by immunoassay; Influenza  (In-House)               CHARGE CAPTURE:           Primary Diagnosis:     V79.0 Screening for depression            Z13.89    Encounter for screening for other disorder              Orders:          56202   Office/outpatient visit; established patient, level 3  (In-House)           466.0 Acute bronchitis            J20.9    Acute bronchitis, unspecified    780.79 Fatigue            R53.83    Other fatigue              Orders:          [id number]  Infectious agent antigen detection by immunoassay; Influenza  (In-House)             02990   Infectious agent antigen detection by immunoassay; Influenza  (In-House)

## 2021-05-18 NOTE — PROGRESS NOTES
Viky Swan  1965     Office/Outpatient Visit    Visit Date:  01:11 pm    Provider: Janki Loya N.P. (Assistant: Veronika Montano MA)    Location: [facility], [place]        Electronically signed by Janki Loya N.P. on  2020 10:00:07 PM                             Subjective:        CC: Ms. Swan is a 54 year old White female.  She presents with leg pain that radiates..          HPI:           Complaint of pain in left leg..  The symptom began 6 months ago.  The severity is of moderate intensity.            Complaint of pain in right leg..  The symptom began 3 weeks ago.  iboprofen helps  but never goes away -taking 800mg every 4 hours  She has been told that this is sciatica - She works at [employer] and has gotten some stretching exercises from the PT department, but this does not appear to be working.  she does not feel that PT would benefit her as the stretching does not .  She is interested in MRI     ROS:     CONSTITUTIONAL:  Negative for chills, fatigue and fever.      CARDIOVASCULAR:  Negative for chest pain and pedal edema.      RESPIRATORY:  Negative for recent cough and dyspnea.      GENITOURINARY:  Negative for dysuria, urinary incontinence and change in urine stream.      MUSCULOSKELETAL:  Positive for back pain, joint stiffness and limb pain ( bilateral leg pain ).   Negative for arthralgias or myalgias.      NEUROLOGICAL:  Negative for dizziness, fainting and paresthesias.          Past Medical History / Family History / Social History:         Last Reviewed on 3/13/2020 01:28 PM by Magda Pat    Past Medical History:                 PAST MEDICAL HISTORY         Hyperlipidemia    Hypertension     Sleep Apnea: uses CPAP;     Type 2 Diabetes    Hypothyroidism     Depression    Anxiety         GYNECOLOGICAL HISTORY:        Menopause at age 45.          CURRENT MEDICAL PROVIDERS:    Sleep specialist - Dr. Bradley    Optometrist - Dr. George          PREVENTIVE HEALTH MAINTENANCE             BONE DENSITY: was last done 12/19/19 with the following abnormality noted-- Osteopenia     COLORECTAL CANCER SCREENING: Up to date (colonoscopy q10y; sigmoidoscopy q5y; Cologuard q3y) was last done 1-23-18; colonoscopy with normal results; Dr. Connelly     EYE EXAM: was last done 6/2018 The next one is due  now     MAMMOGRAM: Done within last 2 years and results in are chart was last done 12/23/19 with normal results     PAP SMEAR: was last done 9/8/17 with normal results         Surgical History:         Tubal Ligation         Family History:         Positive for Hypertension ( sister ).      Positive for Lung Cancer ( father ).          Social History:     Occupation: [employer]     Marital Status:      Children: 2 children         Tobacco/Alcohol/Supplements:     Last Reviewed on 3/13/2020 01:01 PM by Elsa Larios    Tobacco: She has never smoked.      Caffeine:  She admits to consuming caffeine via coffee ( 2 servings per day ) and soda ( 3 servings per day ).          Substance Abuse History:     Last Reviewed on 11/22/2019 11:43 AM by Clementina Barrientos    NEGATIVE         Mental Health History:     Last Reviewed on 11/22/2019 11:43 AM by Clementina Barrientos        Communicable Diseases (eg STDs):     Last Reviewed on 11/22/2019 11:43 AM by Clementina Barrientos        Current Problems:     Last Reviewed on 1/23/2020 01:26 PM by Spurling, Sarah C    Essential (primary) hypertension    Mixed hyperlipidemia    Dysthymic disorder    Hypothyroidism, unspecified    Type 2 diabetes mellitus    Obstructive sleep apnea (adult) (pediatric)    Vitamin D deficiency, unspecified    Obesity, unspecified    Vitamin B deficiency, unspecified    Hypercalcemia    Other fecal abnormalities    Encounter for gynecological examination (general) (routine) without abnormal findings    Encounter for screening mammogram for malignant neoplasm of breast    Encounter for screening  for other disorder    Menopausal and other perimenopausal disorders    Other specified disorders of bone density and structure    Other specified disorders of bone, other site    Encounter for screening for depression    Chalazion left upper eyelid    Acute atopic conjunctivitis, left eye    Otitis media, unspecified, bilateral        Immunizations:     Hep A, ped/adol, 2 dose 1/1/2019    influenza, injectable, quadrivalent, preservative free 11/19/2019    Havrix -adult dose (HepA) 5/18/2018    Fluzone (3 + years dose) 10/8/2017    Fluzone Quadrivalent (3+ years) 10/1/2018    Influenza Virus Vaccine, unspecified formulation 10/1/2016    Adacel (Tdap) 7/19/2019        Allergies:     Last Reviewed on 3/13/2020 01:01 PM by Elsa Larios    Novocain:          Current Medications:     Last Reviewed on 3/13/2020 01:01 PM by Elsa Larios    Crestor 10 mg oral tablet [1 tab daily]    levothyroxine 50 mcg oral tablet [1 tab daily]    aspirin 81 mg oral tablet, delayed release (enteric coated) [Take 1 tablet(s) by mouth daily]    Lancet   Lancet [Check blood one to two times daily as directed for ONE TOUCH ULTRA]    cholecalciferol (vitamin D3) 5,000 unit oral capsule [2 capsules QD]    multivitamin oral capsule [Take 1 capsule(s) by mouth daily]    Wellbutrin  mg oral tablet, sustained-release 12 hr [TAKE 1 TABLET TWICE A DAY]    Fish Oil 360-1,200 mg oral capsule [2 tabs daily]    Januvia 100 mg oral tablet [TAKE 1 TABLET DAILY]    hydroCHLOROthiazide 12.5 mg oral tablet [TAKE 1 TABLET DAILY]    metFORMIN 500 mg oral Tablet, Extended Release Gastric Retention 24 hr [take 2 tablets (1,000 mg) by oral route 2 times per day]    losartan 50 mg oral tablet [TAKE 1 TABLET DAILY]    cetirizine 10 mg oral tablet [take 1 tablet (10 mg) by oral route once daily]    amoxicillin 875 mg oral tablet [take 1 tablet (875 mg) by oral route every 12 hours X 10 days]    Calcium 600 600 mg calcium (1,500 mg) oral tablet  [Take once daily with a meal]        Objective:        Vitals:         Current: 7/8/2020 1:16:29 PM    Ht:  4 ft, 11 in;  Wt: 221 lbs;  BMI: 44.6T: 97.3 F (oral);  BP: 129/58 mm Hg (left arm, sitting);  P: 88 bpm (left arm (BP Cuff), sitting);  sCr: 0.7 mg/dL;  GFR: 102.38        Exams:     PHYSICAL EXAM:     GENERAL: vital signs recorded - well developed, well nourished;  no apparent distress;     RESPIRATORY: normal appearance and symmetric expansion of chest wall; normal respiratory rate and pattern with no distress; normal breath sounds with no rales, rhonchi, wheezes or rubs;     CARDIOVASCULAR: normal rate; rhythm is regular;  no edema;     MUSCULOSKELETAL: normal gait; normal range of motion of all major muscle groups; pain with range of motion in: back extension;     NEUROLOGIC: mental status: alert and oriented x 3;     PSYCHIATRIC: appropriate affect and demeanor; normal speech pattern; normal thought and perception;         Assessment:         M79.605   Pain in left leg       M79.604   Pain in right leg       M54.5   Low back pain           ORDERS:         Meds Prescribed:       [Refilled] cetirizine 10 mg oral tablet [take 1 tablet (10 mg) by oral route once daily], #30 (thirty) tablets, Refills: 2 (two)       [New Rx] diclofenac sodium 75 mg oral tablet, delayed release (enteric coated) [take 1 tablet (75 mg) by oral route 2 times per day], #60 (sixty) tablets, Refills: 1 (one)         Radiology/Test Orders:       [id number]  Radiologic examination, spine, lumbosacral;  minimum of four views  (Send-Out)                      Plan:         Pain in right legI do not see any findings on todays exam that will warrant an MRI without Xray/ PT prior.  I did  discuss the improtance of PT and that they will be working with her more than just stretching. We will get a low back xray and have her follow up with PCP - RTO for new or worsening - consider NCS         Low back pain        RADIOLOGY:  I have ordered  Lumbar/Sacral Spine X-ray to be done today.            Prescriptions:       [New Rx] diclofenac sodium 75 mg oral tablet, delayed release (enteric coated) [take 1 tablet (75 mg) by oral route 2 times per day], #60 (sixty) tablets, Refills: 1 (one)           Orders:       [id number]  Radiologic examination, spine, lumbosacral;  minimum of four views  (Send-Out)                Patient Education Handouts:       Exercises - Treatment for Low Back Pain    PTC              Other Prescriptions:       [Refilled] cetirizine 10 mg oral tablet [take 1 tablet (10 mg) by oral route once daily], #30 (thirty) tablets, Refills: 2 (two)         Charge Capture:         Primary Diagnosis:     M79.605  Pain in left leg           Orders:      [id number]  Office/outpatient visit; established patient, level 3  (In-House)              M79.604  Pain in right leg     M54.5  Low back pain

## 2021-05-18 NOTE — PROGRESS NOTES
Viky Swan 1965     Office/Outpatient Visit    Visit Date:  06:13 pm    Provider: Clementina Barrientos N.P. (Assistant: Clau Sandy MA)    Location: [facility]        Electronically signed by Clementina Barrientos N.P. on  2018 12:03:00 PM                             SUBJECTIVE:        CC:     Ms. Swan is a 52 year old White female.  Patient is here for routine check up.;         HPI:         Patient to be evaluated for hTN.  States doing well on med. Her bp has been similar to today's reading.          With regard to the type 2 diabetes, pt states doing well on meds. Here for f/u and refills. Recent A1C 8.8.  Medication increased and pt scheduled with dietician and DM educator.         Obesity: Pt states trying to get back on track. She has started to exercise and eat better. This has been difficult for her but she feels she needs to do it for her overall health.      ROS:     CONSTITUTIONAL:  Negative for chills, fatigue, fever, and weight change.      EYES:  Negative for blurred vision.      CARDIOVASCULAR:  Negative for chest pain, orthopnea, paroxysmal nocturnal dyspnea and pedal edema.      RESPIRATORY:  Negative for dyspnea.      GASTROINTESTINAL:  Negative for abdominal pain, constipation, diarrhea, nausea and vomiting.      NEUROLOGICAL:  Negative for dizziness, headaches, paresthesias, and weakness.      PSYCHIATRIC:  Negative for anxiety, depression, and sleep disturbances.          PMH/FMH/SH:     Last Reviewed on 2018 01:40 PM by Clementina Barrientos    Past Medical History:                 PAST MEDICAL HISTORY             GYNECOLOGICAL HISTORY:             PREVENTIVE HEALTH MAINTENANCE             COLORECTAL CANCER SCREENING: Up to date (colonoscopy q10y; sigmoidoscopy q5y; Cologuard q3y) was last done 18; colonoscopy with normal results     MAMMOGRAM: Done within last 2 years and results in are chart was last done 2017 with normal  results         Surgical History:         Tubal Ligation         Family History:         Positive for Hypertension ( sister ).      Positive for Lung Cancer ( father ).          Social History:     Occupation: Homemaker     Marital Status:      Children: 2 children         Tobacco/Alcohol/Supplements:     Last Reviewed on 7/12/2018 06:17 PM by Clau Sandy    Tobacco: She has never smoked.      Caffeine:  She admits to consuming caffeine via coffee ( 2 servings per day ) and soda ( 3 servings per day ).          Substance Abuse History:     Last Reviewed on 6/05/2018 01:40 PM by Clementina Barrientos    NEGATIVE         Mental Health History:     Last Reviewed on 6/05/2018 01:40 PM by Clementina Barrientos        Communicable Diseases (eg STDs):     Last Reviewed on 6/05/2018 01:40 PM by Clementina Barrientos            Current Problems:     Last Reviewed on 6/05/2018 01:40 PM by Clementina Barrientos    History of noncompliance with medical treatment     Nonalcoholic fatty liver     Vitamin B12 deficiency     Obesity, NOS     Vitamin D deficiency, NOS     Lower back pain     Sleep apnea     Diffuse arthralgia     Type 2 diabetes     Hypothyroidism     HTN     Hypercholesterolemia     Depression with anxiety     GERD     Allergies         Immunizations:     Havrix -adult dose (HepA) 5/18/2018     Fluzone (3 + years dose) 10/8/2017     Influenza Virus Vaccine, unspecified formulation 10/1/2016         Allergies:     Last Reviewed on 7/12/2018 06:13 PM by Clau Sandy    Novocain:        Current Medications:     Last Reviewed on 7/12/2018 06:13 PM by Clau Sandy    Lancet   Lancet Check blood one to two times daily as directed for ONE TOUCH ULTRA     Metformin HCl 1,000mg Tablets, Extended Release Take 1 tablet(s) by mouth twice daily     Januvia 100mg Tablet Take 1 tablet(s) by mouth daily     Crestor 10mg Tablet 1 tab daily     Levothyroxine Sodium 0.05mg Tablet 1 tab daily     Losartan/Hydrochlorothiazide  50mg/12.5mg Tablet Take 1 tablet(s) by mouth daily     Aspirin (ASA) 81mg Tablets, Enteric Coated Take 1 tablet(s) by mouth daily     Fish Oil 1,200mg Softgel capsule 2 tabs daily     Multivitamins Capsules Take 1 capsule(s) by mouth daily     Vitamin D3 5,000IU Capsules 2 capsules QD         OBJECTIVE:        Vitals:         Current: 7/12/2018 6:16:02 PM    Ht:  4 ft, 11 in;  Wt: 232.6 lbs;  BMI: 47.0    T: 97.9 F (oral);  BP: 112/66 mm Hg (left arm, sitting);  P: 71 bpm (left arm (BP Cuff), sitting);  sCr: 0.64 mg/dL;  GFR: 116.99        Exams:     PHYSICAL EXAM:     GENERAL: vital signs recorded - well developed, well nourished;  no apparent distress;     EYES: extraocular movements intact; conjunctiva and cornea are normal; PERRLA;     NECK: range of motion is normal; thyroid is non-palpable;     RESPIRATORY: normal respiratory rate and pattern with no distress; normal breath sounds with no rales, rhonchi, wheezes or rubs;     CARDIOVASCULAR: normal rate; rhythm is regular;  no systolic murmur; no edema;     GASTROINTESTINAL: nontender; normal bowel sounds; no organomegaly;     NEUROLOGICAL:  cranial nerves, motor and sensory function, reflexes, gait and coordination are all intact;     PSYCHIATRIC:  appropriate affect and demeanor; normal speech pattern; grossly normal memory;         ASSESSMENT:           401.1   I10  HTN              DDx:     250.00   E13.9  Type 2 diabetes              DDx:     278.00   E66.9  Obesity, NOS              DDx:         ORDERS:         Meds Prescribed:       Refill of: Januvia (Sitagliptin Phosphate) 100mg Tablet Take 1 tablet(s) by mouth daily  #30 (Thirty) tablet(s) Refills: 1         Procedures Ordered:       REFER  Referral to Specialist or Other Facility  (Send-Out)                   PLAN:          Type 2 diabetes           Prescriptions:       Refill of: Januvia (Sitagliptin Phosphate) 100mg Tablet Take 1 tablet(s) by mouth daily  #30 (Thirty) tablet(s) Refills: 1            Orders:       REFER  Referral to Specialist or Other Facility  (Send-Out)            Obesity, NOS Continue regimen, will continue to monitor.             CHARGE CAPTURE:           Primary Diagnosis:     401.1 HTN            I10    Essential (primary) hypertension              Orders:          [id number]   Office/outpatient visit; established patient, level 3  (In-House)           250.00 Type 2 diabetes            E13.9    Other specified diabetes mellitus without complications    278.00 Obesity, NOS            E66.9    Obesity, unspecified

## 2021-05-18 NOTE — PROGRESS NOTES
Viky Swan  1965     Office/Outpatient Visit    Visit Date: Fri, Mar 13, 2020 12:58 pm    Provider: Magda Pat N.P. (Assistant: Elsa Larios MA)    Location: [facility]        Electronically signed by Magda Pat N.P. on  03/13/2020 03:19:41 PM                             Subjective:        CC: Ms. Swan is a 54 year old White female.  This is a follow-up visit.  3 month check up;         HPI: Viky with history of Type 2 diabetes. Last HgbA1C was 7.3 12/2019. Currently taking Metformin and Januvia. She is on ARB, statin, and baby Aspirin as well. Due for eye exam. Last was 6/2018. Last foot exam 12/11/19.      On Levothyroxine for hypothyroidism. TSH last checked 12/2019 and was normal.      On Losartan/HCTZ for HTN. Doing well.       On Crestor and fish oil for mixed hyperlipidemia.       AKIN diagnosed in the past couple of years. She is compliant with CPAP machine and has noticed improvement in her sleep.      History of depression with anxiety. Doing well. Has been taking Wellbutrin for at least 5 years. Denies suicidal ideation.      Osteopenia noted on bone density scan. She is taking OTC Vitamin D. Not currently taking calcium supplement. She does try to eat calcium in her diet.    ROS:     CONSTITUTIONAL:  Negative for chills and fever.      EYES:  Negative for eye drainage and eye pain.      E/N/T:  Positive for ear pain ( bilateral ).   Negative for sore throat.      CARDIOVASCULAR:  Negative for chest pain and palpitations.      RESPIRATORY:  Negative for dyspnea and frequent wheezing.      GASTROINTESTINAL:  Negative for abdominal pain and vomiting.      NEUROLOGICAL:  Negative for dizziness and headaches.      PSYCHIATRIC:  Negative for depression and suicidal thoughts.          Past Medical History / Family History / Social History:         Last Reviewed on 3/13/2020 01:28 PM by Magda Pat    Past Medical History:                 PAST MEDICAL HISTORY          Hyperlipidemia    Hypertension     Sleep Apnea: uses CPAP;     Type 2 Diabetes    Hypothyroidism     Depression    Anxiety         GYNECOLOGICAL HISTORY:        Menopause at age 45.          CURRENT MEDICAL PROVIDERS:    Sleep specialist - Dr. Bradley    Optometrist - Dr. George         PREVENTIVE HEALTH MAINTENANCE             BONE DENSITY: was last done 19 with the following abnormality noted-- Osteopenia     COLORECTAL CANCER SCREENING: Up to date (colonoscopy q10y; sigmoidoscopy q5y; Cologuard q3y) was last done 18; colonoscopy with normal results; Dr. Connelly     EYE EXAM: was last done 2018 The next one is due  now     MAMMOGRAM: Done within last 2 years and results in are chart was last done 19 with normal results     PAP SMEAR: was last done 17 with normal results         Surgical History:         Tubal Ligation         Family History:         Positive for Hypertension ( sister ).      Positive for Lung Cancer ( father ).          Social History:     Occupation: [email or web address]     Marital Status:      Children: 2 children         Tobacco/Alcohol/Supplements:     Last Reviewed on 3/13/2020 01:01 PM by Elsa Larios    Tobacco: She has never smoked.      Caffeine:  She admits to consuming caffeine via coffee ( 2 servings per day ) and soda ( 3 servings per day ).          Substance Abuse History:     Last Reviewed on 2019 11:43 AM by Clementina Barrientos    NEGATIVE         Mental Health History:     Last Reviewed on 2019 11:43 AM by Clementina Barrientos        Communicable Diseases (eg STDs):     Last Reviewed on 2019 11:43 AM by Clementina Barrientos        Current Problems:     Last Reviewed on 2020 01:26 PM by Spurling, Sarah C    Essential (primary) hypertension    Mixed hyperlipidemia    Dysthymic disorder    Hypothyroidism, unspecified    Type 2 diabetes mellitus    Obstructive sleep apnea (adult) (pediatric)    Vitamin D deficiency, unspecified     Obesity, unspecified    Vitamin B deficiency, unspecified    Hypercalcemia    Other fecal abnormalities    Encounter for gynecological examination (general) (routine) without abnormal findings    Encounter for screening mammogram for malignant neoplasm of breast    Encounter for screening for other disorder    Menopausal and other perimenopausal disorders    Other specified disorders of bone density and structure    Other specified disorders of bone, other site    Encounter for screening for depression    Chalazion left upper eyelid    Acute atopic conjunctivitis, left eye        Immunizations:     Hep A, ped/adol, 2 dose 1/1/2019    influenza, injectable, quadrivalent, preservative free 11/19/2019    Havrix -adult dose (HepA) 5/18/2018    Fluzone (3 + years dose) 10/8/2017    Fluzone Quadrivalent (3+ years) 10/1/2018    Influenza Virus Vaccine, unspecified formulation 10/1/2016    Adacel (Tdap) 7/19/2019        Allergies:     Last Reviewed on 3/13/2020 01:01 PM by Elsa Larios    Novocain:          Current Medications:     Last Reviewed on 3/13/2020 01:01 PM by Elsa Larios    Crestor 10 mg oral tablet [1 tab daily]    levothyroxine 50 mcg oral tablet [1 tab daily]    aspirin 81 mg oral tablet, delayed release (enteric coated) [Take 1 tablet(s) by mouth daily]    Lancet   Lancet [Check blood one to two times daily as directed for ONE TOUCH ULTRA]    cholecalciferol (vitamin D3) 5,000 unit oral capsule [2 capsules QD]    multivitamin oral capsule [Take 1 capsule(s) by mouth daily]    Wellbutrin  mg oral tablet, sustained-release 12 hr [Take 1 tablet(s) by mouth bid]    Fish Oil 360-1,200 mg oral capsule [2 tabs daily]    Januvia 100 mg oral tablet [Take 1 tablet(s) by mouth daily]    metFORMIN 500 mg oral Tablet, Extended Release Gastric Retention 24 hr [take 2 tablets (1,000 mg) by oral route 2 times per day]        Objective:        Vitals:         Historical:     1/23/2020  BP:   134/73 mm  Hg ( (left arm, , sitting, );) 1/23/2020  P:   84bpm ( (left arm (BP Cuff), , sitting, );) 1/23/2020  Wt:   220lbs    Current: 3/13/2020 1:03:36 PM    Ht:  4 ft, 11 in;  Wt: 218.8 lbs;  BMI: 44.2T: 98.3 F (oral);  BP: 137/87 mm Hg (left arm, sitting);  P: 87 bpm (left arm (BP Cuff), sitting);  sCr: 0.73 mg/dL;  GFR: 97.75        Exams:     PHYSICAL EXAM:     GENERAL: well developed, well nourished;  no apparent distress;     EYES: PERRL, EOMI     E/N/T: EARS: external auditory canal normal;  both TMs are bulging and red;  NOSE: normal turbinates; no sinus tenderness; OROPHARYNX: oral mucosa is normal; posterior pharynx shows no exudate and post nasal drip;     NECK: range of motion is normal; trachea is midline;     RESPIRATORY: normal respiratory rate and pattern with no distress; normal breath sounds with no rales, rhonchi, wheezes or rubs;     CARDIOVASCULAR: normal rate; rhythm is regular;     NEUROLOGIC: mental status: alert and oriented x 3; GROSSLY INTACT     PSYCHIATRIC: appropriate affect and demeanor;         Assessment:         E11   Type 2 diabetes mellitus       I10   Essential (primary) hypertension       E78.2   Mixed hyperlipidemia       F34.1   Dysthymic disorder       E03.9   Hypothyroidism, unspecified       G47.33   Obstructive sleep apnea (adult) (pediatric)       M85.8   Other specified disorders of bone density and structure       H66.93   Otitis media, unspecified, bilateral           ORDERS:         Meds Prescribed:       [New Rx] losartan-hydrochlorothiazide 50-12.5 mg oral tablet [take 1 tablet by oral route once daily], #90 (ninety) tablets, Refills: 0 (zero)       [New Rx] cetirizine 10 mg oral tablet [take 1 tablet (10 mg) by oral route once daily], #30 (thirty) tablets, Refills: 2 (two)       [New Rx] amoxicillin 875 mg oral tablet [take 1 tablet (875 mg) by oral route every 12 hours X 10 days], #20 (twenty) tablets, Refills: 0 (zero)       [New Rx] Calcium 600 600 mg calcium (1,500  mg) oral tablet [Take once daily with a meal], #90 (ninety) tablets, Refills: 0 (zero)         Radiology/Test Orders:       3017F  Colorectal CA screen results documented and reviewed (PV)  (In-House)              Lab Orders:       [id number]  [facility] Comp. Metabolic Panel  (Send-Out)            98038  [facility]1CEG - [facility] Hemoglobin A1C  (Send-Out)              Other Orders:         Screening mammogram results documented  (Send-Out)                      Plan:         Type 2 diabetes mellitusDue for eye exam. Plans to schedule    RECOMMENDATIONS given include: healthy carb, high healthy protein and high fiber diet, Yearly eye exam., Regular exercise such as walking., Blood sugar checks., and Regular foot exams.      Follow up 3- 6 months depending on A1C results.    LABORATORY:  Labs ordered to be performed today include Comprehensive metabolic panel and HgbA1C.  MIPS Vaccines Flu and Pneumonia updated in Shot record Screening mammomgram done within last 2 years and results in are chart Colorectal Cancer Screening is up to date and the results are in the chart           Orders:       [id number]  [facility] Comp. Metabolic Panel  (Send-Out)            [id number]  A1C - [facility] Hemoglobin A1C  (Send-Out)              Screening mammogram results documented  (Send-Out)            3017F  Colorectal CA screen results documented and reviewed (PV)  (In-House)              Essential (primary) hypertension          Prescriptions:       [New Rx] losartan-hydrochlorothiazide 50-12.5 mg oral tablet [take 1 tablet by oral route once daily], #90 (ninety) tablets, Refills: 0 (zero)         Mixed hyperlipidemiaStable. No refills needed.         Dysthymic disorderStable. No refills needed        Hypothyroidism, unspecifiedStable. No refills needed        Obstructive sleep apnea (adult) (pediatric)Complaint with CPAP        Other specified disorders of bone density and structureContinue Vitamin D. No sodas.           Prescriptions:       [New Rx]  Calcium 600 600 mg calcium (1,500 mg) oral tablet [Take once daily with a meal], #90 (ninety) tablets, Refills: 0 (zero)         Otitis media, unspecified, bilateral        RECOMMENDATIONS given include: Push Fluids, Rest, Follow up if no improvement or worsening symptoms like high fevers, vomiting, weakness, or increasing shortness of air.    .            Prescriptions:       [New Rx] cetirizine 10 mg oral tablet [take 1 tablet (10 mg) by oral route once daily], #30 (thirty) tablets, Refills: 2 (two)       [New Rx] amoxicillin 875 mg oral tablet [take 1 tablet (875 mg) by oral route every 12 hours X 10 days], #20 (twenty) tablets, Refills: 0 (zero)             Charge Capture:         Primary Diagnosis:     E11  Type 2 diabetes mellitus           Orders:      [id number]  Office/outpatient visit; established patient, level 4  (In-House)            3017F  Colorectal CA screen results documented and reviewed (PV)  (In-House)              I10  Essential (primary) hypertension     E78.2  Mixed hyperlipidemia     F34.1  Dysthymic disorder     E03.9  Hypothyroidism, unspecified     G47.33  Obstructive sleep apnea (adult) (pediatric)     M85.8  Other specified disorders of bone density and structure     H66.93  Otitis media, unspecified, bilateral

## 2021-05-18 NOTE — PROGRESS NOTES
Viky Swan 1965     Office/Outpatient Visit    Visit Date: Tue, Jun 5, 2018 01:16 pm    Provider: Clementina Barrientos N.P. (Assistant: Pauline Cota MA)    Location: [facility]        Electronically signed by Clementina Barrientos N.P. on  06/09/2018 11:48:14 AM                             SUBJECTIVE:        CC:     Mr. Swan is a 52 year old White female.  PREVENTIVE EXAM;         HPI:         PHQ-9 Depression Screening: Completed form scanned and in chart; Total Score 14 Alcohol Consumption Screening: Completed form scanned and in chart; Total Score 1         Dx with type 2 diabetes; last A1C 7.6 in 9/2017. Pt states doing well on meds.          With regard to the allergies, states seasonal allergy flares.          Dx with health checkup; she cannot recall when she last had a physical exam.  She cannot recall the date of her last menstrual period.  She is not currently using any form of contraception.  She performs breast self-exams occasionally.    Her last mammogram was in 11-30-17.   Her last DEXA was in 11-.   Her last ECG was in 2011.   She has never had a chest x-ray. Preventative Health updated today.  She is current with her influenza immunization.  Tobacco: She has never smoked.      ROS:     CONSTITUTIONAL:  Negative for chills, fatigue, fever, and weight change.      EYES:  Negative for blurred vision.      CARDIOVASCULAR:  Negative for chest pain, orthopnea, paroxysmal nocturnal dyspnea and pedal edema.      RESPIRATORY:  Negative for dyspnea.      GASTROINTESTINAL:  Negative for abdominal pain, constipation, diarrhea, nausea and vomiting.      NEUROLOGICAL:  Negative for dizziness, headaches, paresthesias, and weakness.      PSYCHIATRIC:  Negative for anxiety, depression, and sleep disturbances.          PMH/FMH/SH:     Last Reviewed on 6/05/2018 01:40 PM by Clementina Barrientos    Past Medical History:                 PAST MEDICAL HISTORY             GYNECOLOGICAL  HISTORY:             PREVENTIVE HEALTH MAINTENANCE             COLORECTAL CANCER SCREENING: Up to date (colonoscopy q10y; sigmoidoscopy q5y; Cologuard q3y) was last done 18; colonoscopy with normal results     MAMMOGRAM: Done within last 2 years and results in are chart was last done 2017 with normal results         Surgical History:         Tubal Ligation         Family History:         Positive for Hypertension ( sister ).      Positive for Lung Cancer ( father ).          Social History:     Occupation: Homemaker     Marital Status:      Children: 2 children         Tobacco/Alcohol/Supplements:     Last Reviewed on 2018 01:40 PM by Clementina Barrientos    Tobacco: She has never smoked.      Caffeine:  She admits to consuming caffeine via coffee ( 2 servings per day ) and soda ( 3 servings per day ).          Substance Abuse History:     Last Reviewed on 2018 01:40 PM by Clementina Barrientos    NEGATIVE         Mental Health History:     Last Reviewed on 2018 01:40 PM by Clementina Barrientos        Communicable Diseases (eg STDs):     Last Reviewed on 2018 01:40 PM by Clementina Barrientos            Current Problems:     Last Reviewed on 2018 01:40 PM by Clementina Barrientos    History of noncompliance with medical treatment     Nonalcoholic fatty liver     Vitamin B12 deficiency     Obesity, NOS     Vitamin D deficiency, NOS     Lower back pain     Sleep apnea     Diffuse arthralgia     Type 2 diabetes     Hypothyroidism     HTN     Hypercholesterolemia     Depression with anxiety     GERD         Immunizations:     Havrix -adult dose (HepA) 2018     Fluzone (3 + years dose) 10/8/2017     Influenza Virus Vaccine, unspecified formulation 10/1/2016         Allergies:     Last Reviewed on 2018 01:40 PM by Clementina Barrientos    Novocain:        Current Medications:     Last Reviewed on 2018 01:40 PM by Clementina Barrientos    Metformin HCl 1,000mg Tablets, Extended Release  Take 1 tablet(s) by mouth twice daily     Crestor 10mg Tablet 1 tab daily     Levothyroxine Sodium 0.05mg Tablet 1 tab daily     Losartan/Hydrochlorothiazide 50mg/12.5mg Tablet Take 1 tablet(s) by mouth daily     Lancet   Lancet Check blood one to two times daily as directed for ONE TOUCH ULTRA     Aspirin (ASA) 81mg Tablets, Enteric Coated Take 1 tablet(s) by mouth daily     Fish Oil 1,200mg Softgel capsule 2 tabs daily     Multivitamins Capsules Take 1 capsule(s) by mouth daily     Vitamin D3 5,000IU Capsules 2 capsules QD         OBJECTIVE:        Vitals:         Current: 6/5/2018 1:18:03 PM    Ht:  4 ft, 11 in;  Wt: 232.6 lbs;  BMI: 47.0    T: 97.8 F (oral);  BP: 124/67 mm Hg (left arm, sitting);  P: 80 bpm (left arm (BP Cuff), sitting);  sCr: 0.54 mg/dL;  GFR: 138.66        Exams:     PHYSICAL EXAM:     GENERAL: vital signs recorded - well developed, well nourished;  no apparent distress;     EYES: extraocular movements intact; conjunctiva and cornea are normal; PERRLA;     E/N/T: EARS:  normal external auditory canals and tympanic membranes;  grossly normal hearing; NOSE: nasal mucosa is erythematous;  OROPHARYNX: oral mucosa reveals erythema;     NECK: range of motion is normal; thyroid is non-palpable;     RESPIRATORY: normal respiratory rate and pattern with no distress; normal breath sounds with no rales, rhonchi, wheezes or rubs;     CARDIOVASCULAR: normal rate; rhythm is regular;  no systolic murmur; no edema;     GASTROINTESTINAL: nontender; normal bowel sounds; no organomegaly;     NEUROLOGICAL:  cranial nerves, motor and sensory function, reflexes, gait and coordination are all intact;     PSYCHIATRIC:  appropriate affect and demeanor; normal speech pattern; grossly normal memory;         Lab/Test Results:             Hemoglobin A1c:  7.6 (%) (09/12/2017),             ASSESSMENT:           V79.0   Z13.89  Screening for depression              DDx:     250.00   E13.9  Type 2 diabetes              DDx:      477.0   J30.1  Allergies              DDx:     V70.0   Z00.00  Health checkup              DDx:         ORDERS:         Meds Prescribed:       Bromfed-DM (Brompheniramine/Dextromethorphan/Pseudoephedrine) Syrup 1  to 2  tsp po every 4-6 hrs prn cough/congestion.  #240 ([place]) ml Refills: 0         Lab Orders:       [id number]  [id number] [facility] CBC with Differential/Platelet  (Send-Out)         [id number]  [id number] [facility] Comp Metabolic Panel (14)  (Send-Out)         [id number]  [id number] [facility] Hemoglobin A1c  (Send-Out)         [id number]  [id number] [facility] TSH  (Send-Out)                   PLAN:          Screening for depression     MIPS PHQ-9 Depression Screening: Completed form scanned and in chart; Total Score 14          Type 2 diabetes     LABORATORY:  Labs ordered to be performed today include CBC, Comprehensive metabolic panel, HgbA1C, and TSH.            Orders:       [id number]  [id number] [facility] CBC with Differential/Platelet  (Send-Out)         [id number]  [id number] [facility] Comp Metabolic Panel (14)  (Send-Out)         [id number]  [id number] [facility] Hemoglobin A1c  (Send-Out)         [id number]  [id number] [facility] TSH  (Send-Out)            Allergies           Prescriptions:       Bromfed-DM (Brompheniramine/Dextromethorphan/Pseudoephedrine) Syrup 1  to 2  tsp po every 4-6 hrs prn cough/congestion.  #240 ([place]) ml Refills: 0             CHARGE CAPTURE:           Primary Diagnosis:     V79.0 Screening for depression            Z13.89    Encounter for screening for other disorder              Orders:          [id number]   Preventive medicine, established patient, age 40-64 years  (In-House)           250.00 Type 2 diabetes            E13.9    Other specified diabetes mellitus without complications    477.0 Allergies            J30.1    Allergic rhinitis due to pollen    V70.0 Health checkup            Z00.00    Encounter for general adult medical examination without abnormal findings

## 2021-05-18 NOTE — PROGRESS NOTES
Viky Swan  1965     Office/Outpatient Visit    Visit Date: Mon, Mar 8, 2021 03:48 pm    Provider: Magda Pat N.P. (Assistant: Emperatriz Cheema MA)    Location: [facility]        Electronically signed by Magda Pat N.P. on  03/10/2021 06:10:31 PM                             Subjective:        CC: Ms. Swan is a 55 year old White female.  Patient presents today for well woman exam;         HPI:           With regard to the encounter for gynecological examination (general) (routine) without abnormal findings, she cannot recall when she last had a physical exam.  She is menopausal.  She is not currently using any form of contraception.  She performs breast self-exams occasionally.   Her last Pap smear was 3 years ago and was normal.   Her last mammogram was 2 years ago and was normal.   Her last DEXA was 2 years ago and showed osteopenia.   Preventative Health updated today.  Ms. Swan denies any history of abnormal Pap smears.  Tobacco: She has never smoked.  She has history of Type 2 diabetes. Last HgbA1C was 7.6  10/2020 . Currently taking Metformin and Januvia. She is on ARB, statin, and baby Aspirin as well. Eye exam UTD 12/2020.     On Levothyroxine for hypothyroidism. TSH last normal.     On Losartan/HCTZ for HTN. Doing well.     On Crestor and fish oil for mixed hyperlipidemia.     AKIN diagnosed in the past couple of years. She is compliant with CPAP machine and has noticed improvement in her sleep.    History of depression with anxiety. Doing well. Has been taking Wellbutrin for at least 5 years. Denies suicidal ideation.    Osteopenia noted on bone density scan. She is taking OTC Vitamin D. Not currently taking calcium supplement. She does try to eat calcium in her diet.    She c/o right sided leg pain. She has had some low back pain with xray showing degenerative changes and spurring. Has completed physical therapy. Did note some improvement with Diclofenac. She  reports pain seems to be more in her knee.    ROS:     CONSTITUTIONAL:  Negative for chills and fever.      CARDIOVASCULAR:  Negative for chest pain and palpitations.      RESPIRATORY:  Negative for dyspnea and frequent wheezing.      GASTROINTESTINAL:  Negative for abdominal pain and vomiting.      GENITOURINARY:  Negative for dysuria and frequent urination.      MUSCULOSKELETAL:  Positive for limb pain ( right leg pain ).      NEUROLOGICAL:  Negative for dizziness and headaches.      PSYCHIATRIC:  Positive for teri depression ( (stable on current medications) ).   Negative for suicidal thoughts.          Past Medical History / Family History / Social History:         Last Reviewed on 2020 04:32 PM by Magda Pat    Past Medical History:                 PAST MEDICAL HISTORY         Hyperlipidemia    Hypertension     Sleep Apnea: uses CPAP;     Type 2 Diabetes    Hypothyroidism     Depression    Anxiety         GYNECOLOGICAL HISTORY:        Menopause at age 45.          CURRENT MEDICAL PROVIDERS:    Sleep specialist - Dr. Bradley    Optometrist - Dr. George         PREVENTIVE HEALTH MAINTENANCE             BONE DENSITY: was last done 19 with the following abnormality noted-- Osteopenia     COLORECTAL CANCER SCREENING: Up to date (colonoscopy q10y; sigmoidoscopy q5y; Cologuard q3y) was last done 18; colonoscopy with normal results; Dr. Connelly     EYE EXAM: Diabetic Eye Exam during this calendar year and results are in chart was last done 2020 Dr George     MAMMOGRAM: Done within last 2 years and results in are chart was last done 19 with normal results     PAP SMEAR: was last done 17 with normal results         Surgical History:         Tubal Ligation         Family History:         Positive for Hypertension ( sister ).      Positive for Lung Cancer ( father ).          Social History:     Occupation: [employer]     Marital Status:      Children: 2 children          Tobacco/Alcohol/Supplements:     Last Reviewed on 12/18/2020 01:40 PM by Luz Elena Sandy    Tobacco: She has never smoked.      Caffeine:  She admits to consuming caffeine via coffee ( 2 servings per day ) and soda ( 3 servings per day ).          Substance Abuse History:     Last Reviewed on 11/22/2019 11:43 AM by Clementina Barrientos    NEGATIVE         Mental Health History:     Last Reviewed on 11/22/2019 11:43 AM by Clementina Barrientos        Communicable Diseases (eg STDs):     Last Reviewed on 11/22/2019 11:43 AM by Clementina Barrientos        Current Problems:     Last Reviewed on 7/08/2020 01:25 PM by Janki Loya    Essential (primary) hypertension    Mixed hyperlipidemia    Dysthymic disorder    Hypothyroidism, unspecified    Type 2 diabetes mellitus    Obstructive sleep apnea (adult) (pediatric)    Vitamin D deficiency, unspecified    Obesity, unspecified    Vitamin B deficiency, unspecified    Hypercalcemia    Other fecal abnormalities    Encounter for gynecological examination (general) (routine) without abnormal findings    Encounter for screening mammogram for malignant neoplasm of breast    Encounter for screening for other disorder    Menopausal and other perimenopausal disorders    Other specified disorders of bone density and structure    Other specified disorders of bone, other site    Chalazion left upper eyelid    Acute atopic conjunctivitis, left eye    Encounter for screening for depression    Otitis media, unspecified, bilateral    Low back pain    Pain in right leg    Pain in left leg    Pain in right knee    Encounter for other screening for malignant neoplasm of breast    Encounter for general adult medical examination without abnormal findings        Immunizations:     Hep A, ped/adol, 2 dose 1/1/2019    influenza, injectable, quadrivalent, preservative free 11/19/2019    Influenza, injectable,quadrivalent, preservative free, pediatric 10/21/2020    Havrix -adult dose (HepA) 5/18/2018     Fluzone (3 + years dose) 10/8/2017    Fluzone Quadrivalent (3+ years) 10/1/2018    Influenza Virus Vaccine, unspecified formulation 10/1/2016    Adacel (Tdap) 7/19/2019        Allergies:     Last Reviewed on 12/18/2020 01:39 PM by Luz Elena Sandy    Novocain:          Current Medications:     Last Reviewed on 12/18/2020 01:39 PM by Luz Elena Sandy    Crestor 10 mg oral tablet [TAKE 1 TABLET DAILY]    levothyroxine 50 mcg oral tablet [TAKE 1 TABLET DAILY]    aspirin 81 mg oral tablet, delayed release (enteric coated) [Take 1 tablet(s) by mouth daily]    Lancet   Lancet [Check blood one to two times daily as directed for ONE TOUCH ULTRA]    cholecalciferol (vitamin D3) 5,000 unit oral capsule [2 capsules QD]    multivitamin oral capsule [Take 1 capsule(s) by mouth daily]    Wellbutrin  mg oral tablet, sustained-release 12 hr [TAKE 1 TABLET TWICE A DAY]    Fish Oil 360-1,200 mg oral capsule [2 tabs daily]    Januvia 100 mg oral tablet [TAKE 1 TABLET DAILY]    hydroCHLOROthiazide 12.5 mg oral tablet [TAKE 1 TABLET DAILY]    metFORMIN 500 mg oral Tablet, Extended Release 24 hr [TAKE 2 TABLETS TWICE A DAY ]    losartan 50 mg oral tablet [TAKE 1 TABLET DAILY]    cetirizine 10 mg oral tablet [take 1 tablet (10 mg) by oral route once daily]    Calcium 600 600 mg calcium (1,500 mg) oral tablet [TAKE 1 TABLET ONCE DAILY WITH A MEAL]    diclofenac sodium 75 mg oral tablet, delayed release (enteric coated) [TAKE 1 TABLET TWICE A DAY]        Objective:        Exams:     PHYSICAL EXAM:     GENERAL: well developed, well nourished;  no apparent distress;     EYES: PERRL, EOMI     RESPIRATORY: normal respiratory rate and pattern with no distress; normal breath sounds with no rales, rhonchi, wheezes or rubs;     CARDIOVASCULAR: normal rate; rhythm is regular;     GASTROINTESTINAL: nontender; normal bowel sounds; no organomegaly;     GENITOURINARY: Pap smear taken;  external genitalia: normal without lesions or urethral  abnormalities;;  vagina: normal with good pelvic support and no lesions or discharge;;  cervix: normal appearance without lesions or discharge;;  uterus: normal size and position, well-supported;; Chaperone: declines     BREAST/INTEGUMENT: SKIN: no significant rashes or lesions; no suspicious moles;     MUSCULOSKELETAL: normal gait; no limb or joint pain with range of motion;     NEUROLOGIC: mental status: alert and oriented x 3; GROSSLY INTACT     PSYCHIATRIC: appropriate affect and demeanor;         Assessment:         Z01.419   Encounter for gynecological examination (general) (routine) without abnormal findings       E11   Type 2 diabetes mellitus       E03.9   Hypothyroidism, unspecified       M25.561   Pain in right knee       I10   Essential (primary) hypertension       E78.2   Mixed hyperlipidemia       F34.1   Dysthymic disorder       G47.33   Obstructive sleep apnea (adult) (pediatric)           ORDERS:         Radiology/Test Orders:       [id number]  Right MRI, any joint of lower extremity w/o contrast  (Send-Out)            3017F  Colorectal CA screen results documented and reviewed (PV)  (In-House)            2022F  Dilated retinal eye exam w/interpret by ophthalmologist/optometrist documented/reviewed (DM)4  (In-House)              Lab Orders:       [id number]  Cytopathology, slides, cervical or vaginal; manual screening under MD supervision  (Send-Out)            59786  DIAB2 - [facility] CMP A1C LIPID AND MICRO ALBUM CR RATIO: [id number]  (Send-Out)            59611  TSH - [facility] TSH  (Send-Out)            APPTO  Appointment need  (In-House)              Procedures Ordered:       [id number]  Handlg&/or convey of spec for TR office to lab  (In-House)              Other Orders:         Screening mammogram results documented  (Send-Out)                      Plan:         Encounter for gynecological examination (general) (routine) without abnormal findingsUTD influenza and Tdap vaccine. UTD colonoscopy,  mammogram, bone density scan, diabetic eye exam.         COUNSELING was provided today regarding the following topics: healthy eating habits, regular exercise, and Advised to see dentist and eye doctor regularly..  MIPS Vaccines Flu and Pneumonia updated in Shot record Screening mammomgram done within last 2 years and results in are chart Colorectal Cancer Screening is up to date and the results are in the chart Diabetic Eye Exam during this calendar year and results are in chart           Orders:       [id number]  Handlg&/or convey of spec for TR office to lab  (In-House)            14963  Cytopathology, slides, cervical or vaginal; manual screening under MD supervision  (Send-Out)              Screening mammogram results documented  (Send-Out)            3017F  Colorectal CA screen results documented and reviewed (PV)  (In-House)            2022F  Dilated retinal eye exam w/interpret by ophthalmologist/optometrist documented/reviewed (DM)4  (In-House)              Type 2 diabetes mellitusRechecking labs     LABORATORY:  Labs ordered to be performed today include Diabetes Panel 2;CMP, A1C, Lipid, Microalbumin:Creatinine Ratio.      FOLLOW-UP: Schedule a follow-up visit in 6 months.            Orders:       [id number]  DIAB2 - [facility] CMP A1C LIPID AND MICRO ALBUM CR RATIO: [id number]  (Send-Out)            APPTO  Appointment need  (In-House)              Hypothyroidism, unspecified    LABORATORY:  Labs ordered to be performed today include TSH.            Orders:       [id number]  TSH - [facility] TSH  (Send-Out)              Pain in right knee        RADIOLOGY:  I have ordered a knee MRI to be done today.            Orders:       [id number]  Right MRI, any joint of lower extremity w/o contrast  (Send-Out)              Essential (primary) hypertensionStable        Mixed hyperlipidemiaStable. Rechecking labs as above        Dysthymic disorderStable        Obstructive sleep apnea (adult) (pediatric)Followed by sleep  specialist            Patient Recommendations:        For  Encounter for gynecological examination (general) (routine) without abnormal findings:        Limit dietary intake of fat (especially saturated fat) and cholesterol.  Eat a variety of foods, including plenty of fruits, vegetables, and grain containg fiber, limit fat intake to 30% of total calories. Balance caloric intake with energy expended.    Maintaining regular physical activity is advised to help prevent heart disease, hypertension, diabetes, and obesity.          For  Type 2 diabetes mellitus:    Schedule a follow-up visit in 6 months.              Charge Capture:         Primary Diagnosis:     Z01.419  Encounter for gynecological examination (general) (routine) without abnormal findings           Orders:      [id number]  Preventive medicine, established patient, age 40-64 years  (In-House)            83594  Handlg&/or convey of spec for TR office to lab  (In-House)            3017F  Colorectal CA screen results documented and reviewed (PV)  (In-House)            2022F  Dilated retinal eye exam w/interpret by ophthalmologist/optometrist documented/reviewed (DM)4  (In-House)              E11  Type 2 diabetes mellitus           Orders:      APPTO  Appointment need  (In-House)              E03.9  Hypothyroidism, unspecified     M25.561  Pain in right knee     I10  Essential (primary) hypertension     E78.2  Mixed hyperlipidemia     F34.1  Dysthymic disorder     G47.33  Obstructive sleep apnea (adult) (pediatric)

## 2021-05-18 NOTE — PROGRESS NOTES
Viky Swan  1965     Office/Outpatient Visit    Visit Date: Fri, Nov 22, 2019 11:32 am    Provider: Clementina Barrientos N.P. (Assistant: Emperatriz Cheema MA)    Location: [facility]        Electronically signed by Clementina Barrientos N.P. on  12/04/2019 02:54:30 PM                             Subjective:        CC: Ms. Swan is a 54 year old White female.  Patient presents today for well woman exam;         HPI:           In regard to the encounter for gynecological examination (general) (routine) without abnormal findings, her last physical exam was 1 year ago.  She is menopausal.  She is not currently using any form of contraception.  She performs breast self-exams occasionally.   Her last Pap smear was in 09/08/2017 and was normal.   Her last mammogram was in 12/07/2018 and was normal.   Her last DEXA was in 11/30/2017 and showed osteopenia.   Preventative Health updated today.  Ms. Swan denies any history of abnormal Pap smears.  Tobacco: She has never smoked.            PHQ-9 Depression Screening: Completed form scanned and in chart; Total Score 7     ROS:     CONSTITUTIONAL:  Negative for chills, fatigue, fever, and weight change.      EYES:  Negative for blurred vision, eye pain, and photophobia.      E/N/T:  Negative for hearing problems, E/N/T pain, congestion, rhinorrhea, epistaxis, hoarseness, and dental problems.      CARDIOVASCULAR:  Negative for chest pain, palpitations, tachycardia, orthopnea, and edema.      RESPIRATORY:  Negative for cough, dyspnea, and hemoptysis.      GASTROINTESTINAL:  Negative for abdominal pain, heartburn, constipation, diarrhea, and stool changes.      GENITOURINARY:  Negative for genital lesions, hematuria, menstrual problems, polyuria, abnormal vaginal bleeding, and vaginal discharge.      MUSCULOSKELETAL:  Negative for arthralgias, back pain, and myalgias.      INTEGUMENTARY/BREAST:  Negative for atypical moles, dry skin, pruritis, rashes,  breast masses, and nipple discharge.      NEUROLOGICAL:  Negative for dizziness, headaches, paresthesias, and weakness.      HEMATOLOGIC/LYMPHATIC:  Negative for easy bruising, bleeding, and lymphadenopathy.      ENDOCRINE:  Negative for hair loss, heat/cold intolerance, polydipsia, and polyphagia.      ALLERGIC/IMMUNOLOGIC:  Negative for allergies, frequent illnesses, HIV exposure, and urticaria.      PSYCHIATRIC:  Negative for anxiety, depression, and sleep disturbances.          Past Medical History / Family History / Social History:         Last Reviewed on 2019 11:43 AM by Clementina Barrientos    Past Medical History:                 PAST MEDICAL HISTORY             GYNECOLOGICAL HISTORY:        Menopause at age 45.          PREVENTIVE HEALTH MAINTENANCE             COLORECTAL CANCER SCREENING: Up to date (colonoscopy q10y; sigmoidoscopy q5y; Cologuard q3y) was last done 18; colonoscopy with normal results     MAMMOGRAM: Done within last 2 years and results in are chart was last done 2017 with normal results         Surgical History:         Tubal Ligation         Family History:         Positive for Hypertension ( sister ).      Positive for Lung Cancer ( father ).          Social History:     Occupation: Homemaker     Marital Status:      Children: 2 children         Tobacco/Alcohol/Supplements:     Last Reviewed on 2019 11:43 AM by Clementina Barrientos    Tobacco: She has never smoked.      Caffeine:  She admits to consuming caffeine via coffee ( 2 servings per day ) and soda ( 3 servings per day ).          Substance Abuse History:     Last Reviewed on 2019 11:43 AM by Clementina Barrientos    NEGATIVE         Mental Health History:     Last Reviewed on 2019 11:43 AM by Clementina Barrientos        Communicable Diseases (eg STDs):     Last Reviewed on 2019 11:43 AM by Clementina Barrientos        Current Problems:     Last Reviewed on 2019 11:43 AM by Floyd  Clementina    Essential (primary) hypertension    Mixed hyperlipidemia    Dysthymic disorder    Hypothyroidism, unspecified    Other specified diabetes mellitus without complications    Obstructive sleep apnea (adult) (pediatric)    Vitamin D deficiency, unspecified    Obesity, unspecified    Vitamin B deficiency, unspecified    Hypercalcemia    Encounter for gynecological examination (general) (routine) without abnormal findings    Encounter for screening for other disorder        Immunizations:     influenza, injectable, quadrivalent, preservative free 11/19/2019    Havrix -adult dose (HepA) 5/18/2018    Fluzone (3 + years dose) 10/8/2017    Fluzone Quadrivalent (3+ years) 10/1/2018    Influenza Virus Vaccine, unspecified formulation 10/1/2016    Adacel (Tdap) 7/19/2019        Allergies:     Last Reviewed on 11/22/2019 11:43 AM by Clementina Barrientos    Novocain:          Current Medications:     Last Reviewed on 11/22/2019 11:43 AM by Clementina Barrientos    Crestor 10mg Tablet [1 tab daily]    metFORMIN 1,000 mg oral Tablet, Extended Release 24 hr [TAKE 1 TABLET TWICE A DAY]    levothyroxine 50 mcg oral tablet [1 tab daily]    aspirin 81 mg oral tablet, delayed release (enteric coated) [Take 1 tablet(s) by mouth daily]    Lancet   Lancet [Check blood one to two times daily as directed for ONE TOUCH ULTRA]    losartan-hydrochlorothiazide 50-12.5 mg oral tablet [Take 1 tablet(s) by mouth daily]    cholecalciferol (vitamin D3) 5,000 unit oral capsule [2 capsules QD]    multivitamin oral capsule [Take 1 capsule(s) by mouth daily]    Wellbutrin SR 150mg Tablets, Sustained Release [Take 1 tablet(s) by mouth bid]    Fish Oil 360-1,200 mg oral capsule [2 tabs daily]    Januvia 100mg Tablet [Take 1 tablet(s) by mouth daily]        Objective:        Vitals:         Current: 11/22/2019 11:39:40 AM    Ht:  4 ft, 11 in;  Wt: 214.6 lbs;  BMI: 43.3T: 97.6 F (oral);  BP: 124/63 mm Hg (right arm, sitting);  P: 90 bpm (right arm (BP  Cuff), sitting);  sCr: 0.57 mg/dL;  GFR: 124.16        Exams:     PHYSICAL EXAM:     GENERAL: vital signs recorded - well developed, well nourished;  no apparent distress;     EYES: extraocular movements intact; conjunctiva and cornea are normal; PERRLA;     E/N/T:  normal EACs, TMs, nasal/oral mucosa, teeth, gingiva, and oropharynx;     NECK: range of motion is normal; thyroid is non-palpable;     RESPIRATORY: normal respiratory rate and pattern with no distress; normal breath sounds with no rales, rhonchi, wheezes or rubs;     CARDIOVASCULAR: normal rate; rhythm is regular;  no systolic murmur; no edema;     GASTROINTESTINAL: nontender; normal bowel sounds; no organomegaly; rectal exam: stool positive for occult blood;     GENITOURINARY: external genitalia: normal without lesions or urethral abnormalities;;  vagina: normal with good pelvic support and no lesions or discharge;;  adnexa: normal with no masses or tenderness     LYMPHATIC: no enlargement of cervical or facial nodes; no supraclavicular nodes;     BREAST/INTEGUMENT: BREASTS: breast exam is normal without masses, skin changes, or nipple discharge; SKIN: no significant rashes or lesions; no suspicious moles;     MUSCULOSKELETAL:  Normal range of motion, strength and tone;     NEUROLOGICAL:  cranial nerves, motor and sensory function, reflexes, gait and coordination are all intact;     PSYCHIATRIC:  appropriate affect and demeanor; normal speech pattern; grossly normal memory;         Assessment:         V72.31   Well Woman Exam       Z01.419   Encounter for gynecological examination (general) (routine) without abnormal findings       Z13.89   Encounter for screening for other disorder       R19.5   Other fecal abnormalities       Z12.31   Encounter for screening mammogram for malignant neoplasm of breast           ORDERS:         Radiology/Test Orders:       [id number]  Screening digital breast tomosynthesis bi  (Send-Out)            3014F  Screening  mammography results documented and reviewed (PV)1  (In-House)            3017F  Colorectal CA screen results documented and reviewed (PV)  (In-House)              Lab Orders:       [id number]  OBIA - [facility] Occult blood by immunoassay  (Send-Out)              Other Orders:         Depression screen negative  (In-House)            1101F  Pt screen for fall risk; document no falls in past year or only 1 fall w/o injury in past year (LESTER)  (In-House)                      Plan:         Well Woman Exam    MIPS Negative Depression Screen           Orders:         Depression screen negative  (In-House)            1101F  Pt screen for fall risk; document no falls in past year or only 1 fall w/o injury in past year (LESTER)  (In-House)            3014F  Screening mammography results documented and reviewed (PV)1  (In-House)            3017F  Colorectal CA screen results documented and reviewed (PV)  (In-House)              Other fecal abnormalities    LABORATORY:  Labs ordered to be performed today include Hemocult, Routine Screening.            Orders:       [id number]  OBIA - [facility] Occult blood by immunoassay  (Send-Out)              Encounter for screening mammogram for malignant neoplasm of breast        FOLLOW-UP TESTING #1:    RADIOLOGY:  I have ordered Screening 3D Mammogram Bilateral to be done today.            Orders:       [id number]  Screening digital breast tomosynthesis bi  (Send-Out)                  Charge Capture:         Primary Diagnosis:     V72.31  Well Woman Exam           Orders:      [id number]  Preventive medicine, established patient, age 40-64 years  (In-House)              Depression screen negative  (In-House)            1101F  Pt screen for fall risk; document no falls in past year or only 1 fall w/o injury in past year (LESTER)  (In-House)            3014F  Screening mammography results documented and reviewed (PV)1  (In-House)            3017F  Colorectal CA screen results documented and reviewed (PV)   (In-House)              Z01.419  Encounter for gynecological examination (general) (routine) without abnormal findings     Z13.89  Encounter for screening for other disorder     R19.5  Other fecal abnormalities     Z12.31  Encounter for screening mammogram for malignant neoplasm of breast

## 2021-05-18 NOTE — PROGRESS NOTES
Viky Swan 1965     Office/Outpatient Visit    Visit Date: Wed, Oct 3, 2018 10:08 am    Provider: Rosibel Burns N.P. (Assistant: Sammi Pérez MA)    Location: [facility]        Electronically signed by Rosibel Burns N.P. on  10/04/2018 04:01:04 PM                             SUBJECTIVE:        CC:     Ms. Swan is a 53 year old White female.  She presents with rash of left upper abdomen area x 3 day , and back pain x 3weeks.          HPI:         Rash noted.  The rash has not occurred previously.  She denies any associated symptoms.  No contributing factors are identified.  under breasts x 3 days.   mildly pruritic.  concerned it is due to her gall bladder not functioning properly.          In regard to the back pain, the discomfort is most prominent in the left, mid and right, mid thoracic spine.  The pain does not radiate.  She characterizes it as intermittent, mild in severity, and aching.  This is a chronic, but intermittent problem with an acute exacerbation.  She states that the current episode of pain started one week ago.  She does not recall any precipitating event or injury.  Aggravating factors contributing to the back pain may be lifting.  Associated symptoms include nausea.  She denies fever, incontinence, numbness in the arms, numbness in the legs, paravertebral muscle spasm, radicular arm pain, radicular leg pain or vomiting.  Other details: concerned this is caused by gallbladder.  hida scan 4 yrs ago or more showed decreased gallbladder function but not bad enough for surgery.  aslo with hx hiatal hernia per EGD (dr ambrose).  not taking any acid reducing medications at this time..          Dx with epigastric abdominal pain; this is located primarily in the epigastric region.  It does not radiate.  It began 5 days ago.  The onset of pain occurred with no apparent trigger.  She characterizes it as aching.  It is of mild intensity.  She estimates that the frequency  of pain is waxes and wanes.      ROS:     CONSTITUTIONAL:  Positive for fatigue.   Negative for fever.      E/N/T:  Negative for hearing problems, E/N/T pain, congestion, rhinorrhea, epistaxis, hoarseness, and dental problems.      CARDIOVASCULAR:  Negative for chest pain, palpitations, tachycardia, orthopnea, and edema.      RESPIRATORY:  Negative for cough, dyspnea, and hemoptysis.      GASTROINTESTINAL:  Positive for abdominal pain ( epigastric ), acid reflux symptoms, heartburn and nausea.   Negative for constipation, diarrhea or vomiting.      GENITOURINARY:  Negative for dysuria and frequent urination.      MUSCULOSKELETAL:  Positive for back pain.   Negative for limb pain or myalgias.      INTEGUMENTARY/BREAST:  Positive for rash.      NEUROLOGICAL:  Negative for dizziness, headaches, paresthesias, and weakness.      ENDOCRINE:  Negative for hair loss, heat/cold intolerance, polydipsia, and polyphagia.          PMH/FMH/SH:     Last Reviewed on 2018 06:26 PM by Clementina Barrientos    Past Medical History:                 PAST MEDICAL HISTORY             GYNECOLOGICAL HISTORY:             PREVENTIVE HEALTH MAINTENANCE             COLORECTAL CANCER SCREENING: Up to date (colonoscopy q10y; sigmoidoscopy q5y; Cologuard q3y) was last done 18; colonoscopy with normal results     MAMMOGRAM: Done within last 2 years and results in are chart was last done 2017 with normal results         Surgical History:         Tubal Ligation         Family History:         Positive for Hypertension ( sister ).      Positive for Lung Cancer ( father ).          Social History:     Occupation: Homemaker     Marital Status:      Children: 2 children         Tobacco/Alcohol/Supplements:     Last Reviewed on 2018 06:26 PM by Clementina Barrientos    Tobacco: She has never smoked.      Caffeine:  She admits to consuming caffeine via coffee ( 2 servings per day ) and soda ( 3 servings per day ).          Substance  Abuse History:     Last Reviewed on 7/12/2018 06:26 PM by Clementina Barrientos    NEGATIVE         Mental Health History:     Last Reviewed on 7/12/2018 06:26 PM by Clementina Barrientos        Communicable Diseases (eg STDs):     Last Reviewed on 7/12/2018 06:26 PM by Clementina Barrientos            Current Problems:     Last Reviewed on 7/12/2018 06:26 PM by Clementina Barrientos    History of noncompliance with medical treatment     Nonalcoholic fatty liver     Vitamin B12 deficiency     Obesity, NOS     Vitamin D deficiency, NOS     Lower back pain     Sleep apnea     Diffuse arthralgia     Type 2 diabetes     Hypothyroidism     HTN     Hypercholesterolemia     Depression with anxiety     GERD         Immunizations:     Havrix -adult dose (HepA) 5/18/2018     Fluzone (3 + years dose) 10/8/2017     Influenza Virus Vaccine, unspecified formulation 10/1/2016         Allergies:     Last Reviewed on 10/03/2018 10:13 AM by Sammi Pérez    Novocain:        Current Medications:     Last Reviewed on 10/03/2018 10:13 AM by Sammi Pérez    Crestor 10mg Tablet 1 tab daily     Levothyroxine Sodium 0.05mg Tablet 1 tab daily     Losartan/Hydrochlorothiazide 50mg/12.5mg Tablet Take 1 tablet(s) by mouth daily     Lancet   Lancet Check blood one to two times daily as directed for ONE TOUCH ULTRA     Metformin HCl 1,000mg Tablets, Extended Release Take 1 tablet(s) by mouth twice daily     Januvia 100mg Tablet Take 1 tablet(s) by mouth daily     Aspirin (ASA) 81mg Tablets, Enteric Coated Take 1 tablet(s) by mouth daily     Fish Oil 1,200mg Softgel capsule 2 tabs daily     Multivitamins Capsules Take 1 capsule(s) by mouth daily     Vitamin D3 5,000IU Capsules 2 capsules QD     Bupropion HCl 150mg Tablets, Sustained Release ONE TAB PO QD         OBJECTIVE:        Vitals:         Historical:     07/12/2018  BP:   112/66 mm Hg ( (left arm, , sitting, );)     07/12/2018  Wt:   232.6lbs        Current: 10/3/2018 10:16:31 AM    Ht:  4 ft, 11 in;   Wt: 228 lbs;  BMI: 46.1    T: 98.4 F (oral);  BP: 128/69 mm Hg (left arm, sitting);  P: 81 bpm (left arm (BP Cuff), sitting);  sCr: 0.64 mg/dL;  GFR: 114.74        Exams:     PHYSICAL EXAM:     GENERAL: no apparent distress;     RESPIRATORY: normal respiratory rate and pattern with no distress; normal breath sounds with no rales, rhonchi, wheezes or rubs;     CARDIOVASCULAR: normal rate; rhythm is regular;     GASTROINTESTINAL: nontender; normal bowel sounds;     MUSCULOSKELETAL:  Normal range of motion, strength and tone;     NEUROLOGIC: mental status: alert and oriented x 3; GROSSLY INTACT     PSYCHIATRIC:  appropriate affect and demeanor; normal speech pattern; grossly normal memory;         ASSESSMENT           782.1   R21  Rash              DDx:     724.5   M54.6  Back pain              DDx:     789.06   R10.13  Epigastric abdominal pain              DDx:     787.02   R11.0  Nausea              DDx:         ORDERS:         Meds Prescribed:       Betamethasone/Clotrimazole 0.05%/1% Cream Apply small amount to affected area bid  #15 (Fifteen) gm Refills: 0       Omeprazole 40mg Capsules, Extended Release 1 capsule daily  #30 (Thirty) capsule(s) Refills: 0       Zofran (Ondansetron HCl) 4mg Tablet 1 po q 6 hours prn  #14 (Fourteen) tablet(s) Refills: 0         Lab Orders:       [id number]  [id number] [facility] Amylase, Serum  (Send-Out)         [id number]  [id number] [facility] CBC with Differential/Platelet  (Send-Out)         [id number]  [id number] [facility] Comp Metabolic Panel (14)  (Send-Out)         [id number]  [id number] [facility] Lipase, Serum  (Send-Out)                   PLAN: flaget 4 yrs ago hida scan          Rash           Prescriptions:       Betamethasone/Clotrimazole 0.05%/1% Cream Apply small amount to affected area bid  #15 (Fifteen) gm Refills: 0          Back pain         RECOMMENDATIONS given include: alternating cold packs and moist heat and massage.           Epigastric abdominal pain     LABORATORY:  Labs ordered to be performed  today at [facility] include amylase, CBC, and Lipase.  CMP           Prescriptions:       Omeprazole 40mg Capsules, Extended Release 1 capsule daily  #30 (Thirty) capsule(s) Refills: 0           Orders:       [id number]  [id number] [facility] Amylase, Serum  (Send-Out)         [id number]  [id number] [facility] CBC with Differential/Platelet  (Send-Out)         [id number]  [id number] [facility] Comp Metabolic Panel (14)  (Send-Out)         [id number]  [id number] [facility] Lipase, Serum  (Send-Out)            Nausea           Prescriptions:       Zofran (Ondansetron HCl) 4mg Tablet 1 po q 6 hours prn  #14 (Fourteen) tablet(s) Refills: 0             CHARGE CAPTURE           **Please note: ICD descriptions below are intended for billing purposes only and may not represent clinical diagnoses**        Primary Diagnosis:         782.1 Rash            R21    Rash and other nonspecific skin eruption              Orders:          [id number]   Office/outpatient visit; established patient, level 4  (In-House)           724.5 Back pain            M54.6    Pain in thoracic spine    789.06 Epigastric abdominal pain            R10.13    Epigastric pain    787.02 Nausea            R11.0    Nausea

## 2021-05-18 NOTE — PROGRESS NOTES
Viyk Swan 1965     Office/Outpatient Visit    Visit Date:  11:52 am    Provider: Clementina Barrientos N.P. (Assistant: Emperatriz Cheema MA)    Location: [facility]        Electronically signed by Clementina Barrientos N.P. on  2019 05:18:57 PM                             SUBJECTIVE:        CC:     Ms. Swan is a 53 year old White female.  Patient presents today for follow up visit, also wants to discuss sleep apnea;         HPI:         AKIN :Pt states not seeing a pulmonologist in several years. Here machine is over 10 years old. She would like a follow up and new machine if possible.          Additionally, she presents with history of hTN.  states doing well, here for f/u.          Dx with type 2 diabetes; pt states doing well. Here for f/u and refills.  She has been eating better and has lost 2 pant sizes. People are commenting on her weight loss.     ROS:     CONSTITUTIONAL:  Negative for chills, fatigue, fever, and weight change.      EYES:  Negative for blurred vision.      CARDIOVASCULAR:  Negative for chest pain, orthopnea, paroxysmal nocturnal dyspnea and pedal edema.      RESPIRATORY:  Negative for dyspnea.      GASTROINTESTINAL:  Negative for abdominal pain, constipation, diarrhea, nausea and vomiting.      NEUROLOGICAL:  Negative for dizziness, headaches, paresthesias, and weakness.      PSYCHIATRIC:  Negative for anxiety, depression, and sleep disturbances.          PMH/FMH/SH:     Last Reviewed on 2019 11:59 AM by Clementina Barrientos    Past Medical History:                 PAST MEDICAL HISTORY             GYNECOLOGICAL HISTORY:        Menopause at age 45.          PREVENTIVE HEALTH MAINTENANCE             COLORECTAL CANCER SCREENING: Up to date (colonoscopy q10y; sigmoidoscopy q5y; Cologuard q3y) was last done 18; colonoscopy with normal results     MAMMOGRAM: Done within last 2 years and results in are chart was last done 2017 with normal  results         Surgical History:         Tubal Ligation         Family History:         Positive for Hypertension ( sister ).      Positive for Lung Cancer ( father ).          Social History:     Occupation: Homemaker     Marital Status:      Children: 2 children         Tobacco/Alcohol/Supplements:     Last Reviewed on 6/04/2019 11:59 AM by Clementina Barrientos    Tobacco: She has never smoked.      Caffeine:  She admits to consuming caffeine via coffee ( 2 servings per day ) and soda ( 3 servings per day ).          Substance Abuse History:     Last Reviewed on 6/04/2019 11:59 AM by Clementina Barrientos    NEGATIVE         Mental Health History:     Last Reviewed on 6/04/2019 11:59 AM by Clementina Barrientos        Communicable Diseases (eg STDs):     Last Reviewed on 6/04/2019 11:59 AM by Clementina Barrientos            Current Problems:     Last Reviewed on 6/04/2019 11:59 AM by Clementina Barrientos    History of noncompliance with medical treatment     Nonalcoholic fatty liver     Vitamin B12 deficiency     Obesity, NOS     Vitamin D deficiency, NOS     Lower back pain     Sleep apnea     Type 2 diabetes     HTN     Hypothyroidism     Hypercholesterolemia     Depression with anxiety     GERD         Immunizations:     Havrix -adult dose (HepA) 5/18/2018     Fluzone (3 + years dose) 10/8/2017     Fluzone Quadrivalent (3+ years) 10/1/2018     Influenza Virus Vaccine, unspecified formulation 10/1/2016         Allergies:     Last Reviewed on 6/04/2019 11:59 AM by Clementina Barrientos    Novocain:        Current Medications:     Last Reviewed on 6/04/2019 11:59 AM by Clementina Barrientos    Crestor 10mg Tablet 1 tab daily     Januvia 100mg Tablet Take 1 tablet(s) by mouth daily     Lancet   Lancet Check blood one to two times daily as directed for ONE TOUCH ULTRA     Levothyroxine Sodium 0.05mg Tablet 1 tab daily     Losartan/Hydrochlorothiazide 50mg/12.5mg Tablet Take 1 tablet(s) by mouth daily     Metformin HCl 1,000mg  Tablets, Extended Release Take one PO BID     Wellbutrin SR 150mg Tablets, Sustained Release Take 1 tablet(s) by mouth bid     Aspirin (ASA) 81mg Tablets, Enteric Coated Take 1 tablet(s) by mouth daily     Fish Oil 1,200mg Softgel capsule 2 tabs daily     Multivitamins Capsules Take 1 capsule(s) by mouth daily     Vitamin D3 5,000IU Capsules 2 capsules QD         OBJECTIVE:        Vitals:         Current: 6/4/2019 11:56:25 AM    Ht:  4 ft, 11 in;  Wt: 225.4 lbs;  BMI: 45.5    T: 98.1 F (oral);  BP: 130/68 mm Hg (right arm, sitting);  P: 100 bpm (right arm (BP Cuff), sitting);  sCr: 0.6 mg/dL;  GFR: 121.79        Exams:     PHYSICAL EXAM:     GENERAL: vital signs recorded - well developed, well nourished;  no apparent distress;     EYES: extraocular movements intact; conjunctiva and cornea are normal; PERRLA;     NECK: range of motion is normal; thyroid is non-palpable;     RESPIRATORY: normal respiratory rate and pattern with no distress; normal breath sounds with no rales, rhonchi, wheezes or rubs;     CARDIOVASCULAR: normal rate; rhythm is regular;  no systolic murmur; no edema;     GASTROINTESTINAL: nontender; normal bowel sounds; no organomegaly;     NEUROLOGICAL:  cranial nerves, motor and sensory function, reflexes, gait and coordination are all intact;     PSYCHIATRIC:  appropriate affect and demeanor; normal speech pattern; grossly normal memory;         ASSESSMENT:           780.57   G47.33  Sleep apnea              DDx:     401.1   I10  HTN              DDx:     250.00   E13.9  Type 2 diabetes              DDx:         ORDERS:         Meds Prescribed:       Refill of: Metformin HCl 1,000mg Tablets, Extended Release Take one PO BID  #180 (One [place] [address]) tablet(s) Refills: 1         Lab Orders:       [id number]  A1C - [facility] Hemoglobin A1C  (Send-Out)         [id number]  [facility] PHYSICAL: CMP, CBC, TSH, LIPID: [id number], [id number], [id number], [id number]  (Send-Out)           Procedures Ordered:       REFER  Referral to  Specialist or Other Facility  (Send-Out)                   PLAN:          Sleep apnea     LABORATORY:  Labs ordered to be performed today include PHYSICAL PANEL; CMP, CBC, TSH, LIPID.      REFERRALS:  Referral initiated to a pulmonologist ( Dr. Shan Torres, , [facility] ).            Orders:       REFER  Referral to Specialist or Other Facility  (Send-Out)         [id number]  [facility] PHYSICAL: CMP, CBC, TSH, LIPID: [id number], [id number], [id number], [id number]  (Send-Out)            Type 2 diabetes     LABORATORY:  Labs ordered to be performed today include HgbA1C.            Prescriptions:       Refill of: Metformin HCl 1,000mg Tablets, Extended Release Take one PO BID  #180 (One [place] [address]) tablet(s) Refills: 1           [facility]:       [id number]  (Send-[facility])               CHARGE CAPTURE:           Primary Diagnosis:     780.57 Sleep apnea            G47.33    Obstructive sleep apnea (adult) (pediatric)              Orders:          [id number]   Office/outpatient visit; established patient, level 4  (In-House)           401.1 HTN            I10    Essential (primary) hypertension    250.00 Type 2 diabetes            E13.9    Other specified diabetes mellitus without complications

## 2021-05-18 NOTE — PROGRESS NOTES
Viky Swan  1965     Office/Outpatient Visit    Visit Date:  01:24 pm    Provider: Janki Loya N.P. (Assistant: Spurling, Sarah C, MA)    Location: [facility]        Electronically signed by Janki Loya N.P. on  2020 02:05:30 PM                             Subjective:        CC: Ms. Swan is a 54 year old White female.  Pt thought she might had a sty or maybe pink eye in her left eye.;         HPI:           Complaint of unspecified conjunctivitis..  The symptom began 2 days ago.  The severity is of moderate intensity.  Associated symptoms include left eye swelling, purulent drainage seems like drainage making blurry, eye itching.  feels like a knot under upper eyelid    ROS:     CONSTITUTIONAL:  Negative for chills, fatigue, fever, and weight change.      EYES:  Positive for eye drainage ( purulent left eye drainage ) and itching.   Negative for blurred vision.      CARDIOVASCULAR:  Negative for chest pain, orthopnea, paroxysmal nocturnal dyspnea and pedal edema.      RESPIRATORY:  Negative for dyspnea.      GASTROINTESTINAL:  Negative for abdominal pain, constipation, diarrhea, nausea and vomiting.      NEUROLOGICAL:  Negative for dizziness, headaches, paresthesias, and weakness.      PSYCHIATRIC:  Negative for anxiety, depression, and sleep disturbances.          Past Medical History / Family History / Social History:         Last Reviewed on 2019 11:43 AM by Clementina Barrientos    Past Medical History:                 PAST MEDICAL HISTORY         Hyperlipidemia    Hypertension     Sleep Apnea: uses CPAP;     Type 2 Diabetes    Hypothyroidism     Depression    Anxiety         GYNECOLOGICAL HISTORY:        Menopause at age 45.          CURRENT MEDICAL PROVIDERS:    Sleep specialist - Dr. Bradley    Optometrist - Dr. George         PREVENTIVE HEALTH MAINTENANCE             BONE DENSITY: was last done 19 with the following abnormality  noted-- Osteopenia     COLORECTAL CANCER SCREENING: Up to date (colonoscopy q10y; sigmoidoscopy q5y; Cologuard q3y) was last done 1-23-18; colonoscopy with normal results; Dr. Connelly     EYE EXAM: was last done 6/2018 The next one is due  now     MAMMOGRAM: Done within last 2 years and results in are chart was last done 12/23/19 with normal results     PAP SMEAR: was last done 9/8/17 with normal results         Surgical History:         Tubal Ligation         Family History:         Positive for Hypertension ( sister ).      Positive for Lung Cancer ( father ).          Social History:     Occupation: [employer]     Marital Status:      Children: 2 children         Tobacco/Alcohol/Supplements:     Last Reviewed on 12/11/2019 01:17 PM by Ree Logan    Tobacco: She has never smoked.      Caffeine:  She admits to consuming caffeine via coffee ( 2 servings per day ) and soda ( 3 servings per day ).          Substance Abuse History:     Last Reviewed on 11/22/2019 11:43 AM by Clementina Barrientos    NEGATIVE         Mental Health History:     Last Reviewed on 11/22/2019 11:43 AM by Clementina Barrientos        Communicable Diseases (eg STDs):     Last Reviewed on 11/22/2019 11:43 AM by Clementina Barrientos        Current Problems:     Last Reviewed on 1/23/2020 01:26 PM by Spurling, Sarah C    Essential (primary) hypertension    Mixed hyperlipidemia    Dysthymic disorder    Hypothyroidism, unspecified    Type 2 diabetes mellitus    Obstructive sleep apnea (adult) (pediatric)    Vitamin D deficiency, unspecified    Obesity, unspecified    Vitamin B deficiency, unspecified    Hypercalcemia    Other fecal abnormalities    Encounter for gynecological examination (general) (routine) without abnormal findings    Encounter for screening mammogram for malignant neoplasm of breast    Encounter for screening for other disorder    Menopausal and other perimenopausal disorders    Other specified disorders of bone density and  structure    Other specified disorders of bone, other site        Immunizations:     Hep A, ped/adol, 2 dose 1/1/2019    influenza, injectable, quadrivalent, preservative free 11/19/2019    Havrix -adult dose (HepA) 5/18/2018    Fluzone (3 + years dose) 10/8/2017    Fluzone Quadrivalent (3+ years) 10/1/2018    Influenza Virus Vaccine, unspecified formulation 10/1/2016    Adacel (Tdap) 7/19/2019        Allergies:     Last Reviewed on 1/23/2020 01:26 PM by Spurling, Sarah C    Novocain:          Current Medications:     Last Reviewed on 1/23/2020 01:28 PM by Spurling, Sarah C    Crestor 10 mg oral tablet [1 tab daily]    metFORMIN 1,000 mg oral Tablet, Extended Release 24 hr [TAKE 1 TABLET TWICE A DAY]    levothyroxine 50 mcg oral tablet [1 tab daily]    aspirin 81 mg oral tablet, delayed release (enteric coated) [Take 1 tablet(s) by mouth daily]    Lancet   Lancet [Check blood one to two times daily as directed for ONE TOUCH ULTRA]    cholecalciferol (vitamin D3) 5,000 unit oral capsule [2 capsules QD]    multivitamin oral capsule [Take 1 capsule(s) by mouth daily]    Wellbutrin  mg oral tablet, sustained-release 12 hr [Take 1 tablet(s) by mouth bid]    Fish Oil 360-1,200 mg oral capsule [2 tabs daily]    Januvia 100 mg oral tablet [Take 1 tablet(s) by mouth daily]    losartan 50 mg oral tablet [take 1 tablet (50 mg) by oral route once daily]    hydroCHLOROthiazide 12.5 mg oral tablet [take 1 tablet (12.5 mg) by oral route once daily]        Objective:        Vitals:         Current: 1/23/2020 1:31:00 PM    Ht:  4 ft, 11 in;  Wt: 220 lbs;  BMI: 44.4T: 98.3 F (oral);  BP: 134/73 mm Hg (left arm, sitting);  P: 84 bpm (left arm (BP Cuff), sitting);  sCr: 0.73 mg/dL;  GFR: 97.98VA:  OD, 20/50 OS (near, with correction)        Exams:     PHYSICAL EXAM:     GENERAL: vital signs recorded - well developed, well nourished;  no apparent distress;     EYES: left upper lid chalazion,  left upper and lower lid edema;   extraocular movements intact; conjunctiva and cornea are normal;     NECK: range of motion is normal; thyroid is non-palpable;     RESPIRATORY: normal respiratory rate and pattern with no distress; normal breath sounds with no rales, rhonchi, wheezes or rubs;     CARDIOVASCULAR: normal rate; rhythm is regular;  no systolic murmur; no edema;     LYMPHATIC: no enlargement of cervical or facial nodes;     NEUROLOGICAL:  cranial nerves, motor and sensory function, reflexes, gait and coordination are all intact;     PSYCHIATRIC:  appropriate affect and demeanor; normal speech pattern; grossly normal memory;         Assessment:         Z13.31   Encounter for screening for depression       H00.14   Chalazion left upper eyelid       H10.12   Acute atopic conjunctivitis, left eye           ORDERS:         Meds Prescribed:       [New Rx] Pataday 0.2 % ophthalmic (eye) Drops [instill 1 drop into affected eye(s) by ophthalmic route once daily PRN], #2.5 (two point five) milliliters, Refills: 0 (zero)         Other Orders:         Depression screen negative  (In-House)                      Plan:         Encounter for screening for depression    MIPS PHQ-9 Depression Screening: Completed form scanned and in chart; Total Score 4; Negative Depression Screen           Orders:         Depression screen negative  (In-House)              Chalazion left upper eyelidwarm compresses 4 times per day, wash with baby shampoo, gently massage eye  - follow up with opthamologist if vision disturbance    School/Work Excuse for Today Tomorrow         Acute atopic conjunctivitis, left eye          Prescriptions:       [New Rx] Pataday 0.2 % ophthalmic (eye) Drops [instill 1 drop into affected eye(s) by ophthalmic route once daily PRN], #2.5 (two point five) milliliters, Refills: 0 (zero)             Charge Capture:         Primary Diagnosis:     Z13.31  Encounter for screening for depression           Orders:      80419   Office/outpatient visit; established patient, level 3  (In-House)              Depression screen negative  (In-House)              H00.14  Chalazion left upper eyelid     H10.12  Acute atopic conjunctivitis, left eye

## 2021-05-18 NOTE — PROGRESS NOTES
Viky Swan 1965     Office/Outpatient Visit    Visit Date:  09:59 am    Provider: Clementina Barrientos N.P. (Assistant: Pricila Zuluaga MA)    Location: [facility]        Electronically signed by Clementina Barrientos N.P. on  2019 01:38:33 PM                             SUBJECTIVE:        CC:     Ms. Swan is a 53 year old White female.  This is a follow-up visit.  chronic illnesses;         HPI:         Ms. Swan presents with hTN.  States doing well on meds. Here for f/u and labs.          Obesity: Continued dietary and exercise.          In regard to the type 2 diabetes, dM: States doing well on meds. Here for f/u and labs. She went to the optometrist and was told her eyes had major changes, she returned in 1 month and was much improved. She feels it was due to elevated blood sugar levels. She is doing much better now with her eating.      ROS:     CONSTITUTIONAL:  Negative for chills, fatigue, fever, and weight change.      EYES:  Negative for blurred vision.      CARDIOVASCULAR:  Negative for chest pain, orthopnea, paroxysmal nocturnal dyspnea and pedal edema.      RESPIRATORY:  Negative for dyspnea.      GASTROINTESTINAL:  Negative for abdominal pain, constipation, diarrhea, nausea and vomiting.      MUSCULOSKELETAL:  Negative for arthralgias, back pain, and myalgias.      NEUROLOGICAL:  Negative for dizziness, headaches, paresthesias, and weakness.      PSYCHIATRIC:  Negative for anxiety, depression, and sleep disturbances.          PMH/FMH/SH:     Last Reviewed on 2019 10:31 AM by Clementina Barrientos    Past Medical History:                 PAST MEDICAL HISTORY             GYNECOLOGICAL HISTORY:        Menopause at age 45.          PREVENTIVE HEALTH MAINTENANCE             COLORECTAL CANCER SCREENING: Up to date (colonoscopy q10y; sigmoidoscopy q5y; Cologuard q3y) was last done 18; colonoscopy with normal results     MAMMOGRAM: Done within  last 2 years and results in are chart was last done 11/2017 with normal results         Surgical History:         Tubal Ligation         Family History:         Positive for Hypertension ( sister ).      Positive for Lung Cancer ( father ).          Social History:     Occupation: Homemaker     Marital Status:      Children: 2 children         Tobacco/Alcohol/Supplements:     Last Reviewed on 1/21/2019 10:31 AM by Clementina Barrientos    Tobacco: She has never smoked.      Caffeine:  She admits to consuming caffeine via coffee ( 2 servings per day ) and soda ( 3 servings per day ).          Substance Abuse History:     Last Reviewed on 1/21/2019 10:31 AM by Clementina Barrientos    NEGATIVE         Mental Health History:     Last Reviewed on 1/21/2019 10:31 AM by Clementina Barrientos        Communicable Diseases (eg STDs):     Last Reviewed on 1/21/2019 10:31 AM by Clementina Barrientos            Current Problems:     Last Reviewed on 1/21/2019 10:31 AM by Clementina Barrientos    Back pain     History of noncompliance with medical treatment     Nonalcoholic fatty liver     Vitamin B12 deficiency     Obesity, NOS     Vitamin D deficiency, NOS     Lower back pain     Sleep apnea     Diffuse arthralgia     Type 2 diabetes     Hypothyroidism     HTN     Hypercholesterolemia     Depression with anxiety     GERD     Acute serous otitis media     Shoulder pain     Acute upper respiratory infection of multiple sites         Immunizations:     Havrix -adult dose (HepA) 5/18/2018     Fluzone (3 + years dose) 10/8/2017     Influenza Virus Vaccine, unspecified formulation 10/1/2016         Allergies:     Last Reviewed on 1/21/2019 10:31 AM by Clementina Barrientos    Novocain:        Current Medications:     Last Reviewed on 1/21/2019 10:31 AM by Clementina Barrientos    Crestor 10mg Tablet 1 tab daily     Metformin HCl 1,000mg Tablets, Extended Release Take 1 tablet(s) by mouth twice daily     Januvia 100mg Tablet Take 1 tablet(s) by mouth  daily     Levothyroxine Sodium 0.05mg Tablet 1 tab daily     Losartan/Hydrochlorothiazide 50mg/12.5mg Tablet Take 1 tablet(s) by mouth daily     Wellbutrin SR 150mg Tablets, Sustained Release Take 1 tablet(s) by mouth bid     Lancet   Lancet Check blood one to two times daily as directed for ONE TOUCH ULTRA     Aspirin (ASA) 81mg Tablets, Enteric Coated Take 1 tablet(s) by mouth daily     Amoxicillin 875mg Tablet One PO BID X 10 days.     Cyclobenzaprine HCl 10mg Tablet 1 tablet AT HS PRN     Fish Oil 1,200mg Softgel capsule 2 tabs daily     Multivitamins Capsules Take 1 capsule(s) by mouth daily     Vitamin D3 5,000IU Capsules 2 capsules QD         OBJECTIVE:        Vitals:         Current: 1/21/2019 10:08:07 AM    Ht:  4 ft, 11 in;  Wt: 228.8 lbs;  BMI: 46.2    T: 97.9 F (oral);  BP: 109/56 mm Hg (right arm, sitting);  P: 86 bpm (right arm (BP Cuff), sitting);  sCr: 0.53 mg/dL;  GFR: 138.76        Exams:     PHYSICAL EXAM:     GENERAL: vital signs recorded - well developed, well nourished;  no apparent distress;     EYES: extraocular movements intact; conjunctiva and cornea are normal; PERRLA;     E/N/T:  normal EACs, TMs, nasal/oral mucosa, teeth, gingiva, and oropharynx;     NECK: range of motion is normal; thyroid is non-palpable;     RESPIRATORY: normal respiratory rate and pattern with no distress; normal breath sounds with no rales, rhonchi, wheezes or rubs;     CARDIOVASCULAR: normal rate; rhythm is regular;  no systolic murmur; no edema;     GASTROINTESTINAL: nontender; normal bowel sounds; no organomegaly;     NEUROLOGICAL:  cranial nerves, motor and sensory function, reflexes, gait and coordination are all intact;     PSYCHIATRIC:  appropriate affect and demeanor; normal speech pattern; grossly normal memory;         ASSESSMENT:           401.1   I10  HTN              DDx:     278.00   E66.9  Obesity, NOS              DDx:     250.00   E13.9  Type 2 diabetes              DDx:         ORDERS:         Meds  Prescribed:       Refill of: Losartan/Hydrochlorothiazide 50mg/12.5mg Tablet Take 1 tablet(s) by mouth daily  #90 (Ninety) tablet(s) Refills: 1       Refill of: Metformin HCl 1,000mg Tablets, Extended Release Take 1 tablet(s) by mouth twice daily  #180 (One [address]) tablet(s) Refills: 0         Lab Orders:       [id number]  [facility] 3 part diff  (Send-Out)         11048  COMP [facility] Comp. Metabolic Panel  (Send-Out)         66400  A1CEG - [facility] Hemoglobin A1C  (Send-Out)         19346  LPDP [facility] Lipid Panel  (Send-Out)         20857  TSH - [facility] TSH  (Send-Out)         69905  JOSE [facility] Microablbumin, quantitative  (Send-Out)           Other Orders:         Calculated BMI above the upper parameter and a follow-up plan was documented in the medical record  (In-House)                   PLAN: continue meds          HTN         FOLLOW-UP:.   for reschedule for another month for Annual Checkup           Prescriptions:       Refill of: Losartan/Hydrochlorothiazide 50mg/12.5mg Tablet Take 1 tablet(s) by mouth daily  #90 (Ninety) tablet(s) Refills: 1          Obesity, NOS     MIPS     BMI Elevated - Follow-Up Plan: She was provided education on weight loss strategies           Orders:         Calculated BMI above the upper parameter and a follow-up plan was documented in the medical record  (In-House)            Type 2 diabetes     LABORATORY:  Labs ordered to be performed today include CBC, Comprehensive metabolic panel, HgbA1C, lipid panel, Microalbumin, and TSH.            Prescriptions:       Refill of: Metformin HCl 1,000mg Tablets, Extended Release Take 1 tablet(s) by mouth twice daily  #180 (One [address]) tablet(s) Refills: 0           Orders:       [id number]  [facility]  ([facility])         55681  COMP [facility] Comp. Metabolic Panel  (Send-Out)         05092  A1CEG - [facility] Hemoglobin A1C  (Send-Out)         23371  LPDP [facility] Lipid Panel  (Send-Out)         98010  TSH -  [facility] TSH  (Send-Out)         75375  [facility] Microablbumin, quantitative  (Send-Out)               Patient Recommendations:        For  HTN:                     APPOINTMENT INFORMATION:        Monday Tuesday Wednesday Thursday Friday Saturday Sunday            Time:___________________AM  PM   Date:_____________________             CHARGE CAPTURE:           Primary Diagnosis:     401.1 HTN            I10    Essential (primary) hypertension              Orders:          43187   Office/outpatient visit; established patient, level 4  (In-House)           278.00 Obesity, NOS            E66.9    Obesity, unspecified              Orders:             Calculated BMI above the upper parameter and a follow-up plan was documented in the medical record  (In-House)           250.00 Type 2 diabetes            E13.9    Other specified diabetes mellitus without complications

## 2021-05-18 NOTE — PROGRESS NOTES
Viky Swan  1965     Office/Outpatient Visit    Visit Date: Wed, Dec 11, 2019 01:16 pm    Provider: Nigel Pat N.P. (Assistant: Ree Logan MA)    Location: [facility]        Electronically signed by Nigel Pat N.P. on  12/12/2019 09:01:39 AM                             Subjective:        CC: Ms. Swan is a 54 year old White female.  This is her first visit to the clinic.  establish care with nigel pat;         HPI: Viky is here today to establish care with me as her PCP as her previous provider left the office.       Viky with history of Type 2 diabetes. Last HgbA1C was 7.7 6/2019. Currently taking Metformin and Januvia. She is on ARB, statin, and baby Aspirin as well. Due for eye exam. Last was 6/2018. Reports that her previous provider had considered change in her medication if no improvement in A1C. She states that she has lost some weight. Her  was also recently diagnosed with diabetes and they have made additional changes to their diet.      On Levothyroxine for hypothyroidism. TSH last checked 6/2019 and was normal.      On Losartan/HCTZ for HTN. Doing well. Needs refill.      On Crestor and folic acid for mixed hyperlipidemia. Lipid panel last checked 6/2019.      AKIN diagnosed in the past couple of years. She is compliant with CPAP machine and has noticed improvement in her sleep.      History of depression with anxiety. Doing well. Has been taking Wellbutrin for at least 5 years. Denies suicidal ideation.      Osteopenia noted on bone density scan performed 11/30/17. She is taking OTC Vitamin D.    ROS:     CONSTITUTIONAL:  Negative for chills and fever.      EYES:  Negative for eye drainage and eye pain.      E/N/T:  Negative for ear pain and sore throat.      CARDIOVASCULAR:  Negative for chest pain and palpitations.      RESPIRATORY:  Negative for dyspnea and frequent wheezing.      GASTROINTESTINAL:  Negative for abdominal pain and vomiting.       NEUROLOGICAL:  Negative for dizziness and headaches.      PSYCHIATRIC:  Negative for depression and suicidal thoughts.          Past Medical History / Family History / Social History:         Last Reviewed on 2019 11:43 AM by Clementina Barrientos    Past Medical History:                 PAST MEDICAL HISTORY             GYNECOLOGICAL HISTORY:        Menopause at age 45.          PREVENTIVE HEALTH MAINTENANCE             COLORECTAL CANCER SCREENING: Up to date (colonoscopy q10y; sigmoidoscopy q5y; Cologuard q3y) was last done 18; colonoscopy with normal results     MAMMOGRAM: Done within last 2 years and results in are chart was last done 2017 with normal results         Surgical History:         Tubal Ligation         Family History:         Positive for Hypertension ( sister ).      Positive for Lung Cancer ( father ).          Social History:     Occupation: Homemaker     Marital Status:      Children: 2 children         Tobacco/Alcohol/Supplements:     Last Reviewed on 2019 11:43 AM by Clementina Barrientos    Tobacco: She has never smoked.      Caffeine:  She admits to consuming caffeine via coffee ( 2 servings per day ) and soda ( 3 servings per day ).          Substance Abuse History:     Last Reviewed on 2019 11:43 AM by Clementina Barrientos    NEGATIVE         Mental Health History:     Last Reviewed on 2019 11:43 AM by Clementina Barrientos        Communicable Diseases (eg STDs):     Last Reviewed on 2019 11:43 AM by Clementina Barrientos        Current Problems:     Last Reviewed on 2019 11:43 AM by Clementina Barrientos    Essential (primary) hypertension    HTN    Hypercholesterolemia    Mixed hyperlipidemia    Dysthymic disorder    Depression with anxiety    GERD    Hypothyroidism    Hypothyroidism, unspecified    Other specified diabetes mellitus without complications    Type 2 diabetes    Sleep apnea    Obstructive sleep apnea (adult) (pediatric)    Lower back pain     Obesity, NOS    Vitamin D deficiency, NOS    Vitamin D deficiency, unspecified    Obesity, unspecified    Vitamin B deficiency, unspecified    Vitamin B12 deficiency    Nonalcoholic fatty liver    History of noncompliance with medical treatment    Hypercalcemia    Hypercalcemia    Fatigue    Other fecal abnormalities    Encounter for gynecological examination (general) (routine) without abnormal findings    Encounter for screening mammogram for malignant neoplasm of breast    Encounter for screening for other disorder        Immunizations:     influenza, injectable, quadrivalent, preservative free 11/19/2019    Havrix -adult dose (HepA) 5/18/2018    Fluzone (3 + years dose) 10/8/2017    Fluzone Quadrivalent (3+ years) 10/1/2018    Influenza Virus Vaccine, unspecified formulation 10/1/2016    Adacel (Tdap) 7/19/2019        Allergies:     Last Reviewed on 11/22/2019 11:43 AM by Clementina Barrientos    Novocain:          Current Medications:     Last Reviewed on 11/22/2019 11:43 AM by Clementina Barrientos    Crestor 10mg Tablet [1 tab daily]    metFORMIN 1,000 mg oral Tablet, Extended Release 24 hr [TAKE 1 TABLET TWICE A DAY]    levothyroxine 50 mcg oral tablet [1 tab daily]    aspirin 81 mg oral tablet, delayed release (enteric coated) [Take 1 tablet(s) by mouth daily]    Lancet   Lancet [Check blood one to two times daily as directed for ONE TOUCH ULTRA]    losartan-hydrochlorothiazide 50-12.5 mg oral tablet [Take 1 tablet(s) by mouth daily]    cholecalciferol (vitamin D3) 5,000 unit oral capsule [2 capsules QD]    multivitamin oral capsule [Take 1 capsule(s) by mouth daily]    Wellbutrin SR 150mg Tablets, Sustained Release [Take 1 tablet(s) by mouth bid]    Fish Oil 360-1,200 mg oral capsule [2 tabs daily]    Januvia 100mg Tablet [Take 1 tablet(s) by mouth daily]        Objective:        Vitals:         Historical:     11/22/2019  BP:   124/63 mm Hg ( (right arm, , sitting, );) 11/22/2019  P:   90bpm ( (right arm (BP  Cuff), , sitting, );) 11/22/2019  Wt:   214.6lbs    Current: 12/11/2019 1:21:54 PM    Ht:  4 ft, 11 in;  Wt: 220.2 lbs;  BMI: 44.5T: 97.6 F (oral);  BP: 151/78 mm Hg (right arm, sitting);  P: 84 bpm (right arm (BP Cuff), sitting);  sCr: 0.57 mg/dL;  GFR: 125.53        Exams:     PHYSICAL EXAM:     GENERAL: well developed, well nourished;  no apparent distress;     RESPIRATORY: normal respiratory rate and pattern with no distress; normal breath sounds with no rales, rhonchi, wheezes or rubs;     CARDIOVASCULAR: normal rate; rhythm is regular;     NEUROLOGIC: mental status: alert and oriented x 3; GROSSLY INTACT     PSYCHIATRIC: appropriate affect and demeanor;     Left foot exam    Protective sensation using Monofilament test: NORMAL sensation. Patient detects .07 grams of force which is considered normal.    Vascular status: normal peripheral vascular exam with palpable dorsal pedal and posterior tibal pulses and brisk digital capillary refill    Skin is intact without sores or ulcers    Right foot exam    Protective sensation using Monofilament test: NORMAL sensation. Patient detects .07 grams of force which is considered normal.    Vascular status: normal peripheral vascular exam with palpable dorsal pedal and posterior tibal pulses and brisk digital capillary refill    Skin is intact without sores or ulcers         Assessment:         E11   Type 2 diabetes mellitus       E03.9   Hypothyroidism, unspecified       N95   Menopausal and other perimenopausal disorders       I10   Essential (primary) hypertension       E78.2   Mixed hyperlipidemia       G47.33   Obstructive sleep apnea (adult) (pediatric)       F34.1   Dysthymic disorder       M85.8   Other specified disorders of bone density and structure           ORDERS:         Meds Prescribed:       [Refilled] Januvia 100 mg oral tablet [Take 1 tablet(s) by mouth daily], #90 (ninety) tablets, Refills: 1 (one)       [Refilled] metFORMIN 1,000 mg oral Tablet,  Extended Release 24 hr [TAKE 1 TABLET TWICE A DAY], #180 (one hundred and eighty) tablets, Refills: 1 (one)       [Refilled] levothyroxine 50 mcg oral tablet [1 tab daily], #90 (ninety) tablets, Refills: 1 (one)       [Refilled] losartan-hydrochlorothiazide 50-12.5 mg oral tablet [Take 1 tablet(s) by mouth daily], #90 (ninety) tablets, Refills: 1 (one)       [Refilled] Crestor 10 mg oral tablet [1 tab daily], #90 (ninety) tablets, Refills: 1 (one)       [Refilled] Wellbutrin  mg oral tablet, sustained-release 12 hr [Take 1 tablet(s) by mouth bid], #180 (one hundred and eighty) tablets, Refills: 1 (one)         Radiology/Test Orders:       [id number]  DXA, bone density study, 1 or more sites; axial skeleton (eg hips, pelvis, spine)  (Send-Out)            3014F  Screening mammography results documented and reviewed (PV)1  (In-House)              Lab Orders:       [id number]  DIAB2 - [facility] CMP A1C LIPID AND MICRO ALBUM CR RATIO: [id number]  (Send-Out)            [id number]  TSH - [facility] TSH  (Send-Out)            APPTO  Appointment need  (In-House)              Other Orders:       2028F  Foot examination performed (includes examination through visual inspection, sensory exam with monofilament, and pulse exam - report when any of the three components are completed) (DM)4  (In-House)                      Plan:         Type 2 diabetes mellitusShe is due for eye exam and plans to schedule.     LABORATORY:  Labs ordered to be performed today include Diabetes Panel 2;CMP, A1C, Lipid, Microalbumin:Creatinine Ratio.      RECOMMENDATIONS given include: healthy carb, high healthy protein and high fiber diet, Yearly eye exam., Regular exercise such as walking., Blood sugar checks., and Regular foot exams.  MIPS Vaccines Flu and Pneumonia updated in Shot record Screening mammomgram done within last 2 years and results in are chart     FOLLOW-UP: Schedule a follow-up visit in 3 months.:Patient will call to schedule follow-up  appointment           Prescriptions:       [Refilled] Januvia 100 mg oral tablet [Take 1 tablet(s) by mouth daily], #90 (ninety) tablets, Refills: 1 (one)       [Refilled] metFORMIN 1,000 mg oral Tablet, Extended Release 24 hr [TAKE 1 TABLET TWICE A DAY], #180 (one hundred and eighty) tablets, Refills: 1 (one)           Orders:       [id number]  DIAB2 - [facility] CMP A1C LIPID AND MICRO ALBUM CR RATIO: [id number]  (Send-Out)            3014F  Screening mammography results documented and reviewed (PV)1  (In-House)            APPTO  Appointment need  (In-House)              Hypothyroidism, unspecified    LABORATORY:  Labs ordered to be performed today include TSH.            Prescriptions:       [Refilled] levothyroxine 50 mcg oral tablet [1 tab daily], #90 (ninety) tablets, Refills: 1 (one)           Orders:       [id number]  TSH - [facility] TSH  (Send-Out)              Menopausal and other perimenopausal disordersIs scheduled for mammogram.         RADIOLOGY:  I have ordered Dexa Scan to be done today.            Orders:       [id number]  DXA, bone density study, 1 or more sites; axial skeleton (eg hips, pelvis, spine)  (Send-Out)              Essential (primary) hypertensionBP log. Low sodium diet.           Prescriptions:       [Refilled] losartan-hydrochlorothiazide 50-12.5 mg oral tablet [Take 1 tablet(s) by mouth daily], #90 (ninety) tablets, Refills: 1 (one)         Mixed hyperlipidemiaRechecking lab.           Prescriptions:       [Refilled] Crestor 10 mg oral tablet [1 tab daily], #90 (ninety) tablets, Refills: 1 (one)         Obstructive sleep apnea (adult) (pediatric)Compliant with CPAP        Dysthymic disorder          Prescriptions:       [Refilled] Wellbutrin  mg oral tablet, sustained-release 12 hr [Take 1 tablet(s) by mouth bid], #180 (one hundred and eighty) tablets, Refills: 1 (one)         Other specified disorders of bone density and structureOstepenia on bone density scan. Taking OTC Vitamin D.  Also discussed reducing soda intake and regular exercise such as walking. Repeating bone density scan.             Other Orders      2028F  Foot examination performed (includes examination through visual inspection, sensory exam with monofilament, and pulse exam - report when any of the three components are completed) (DM)4  (In-House)              Patient Recommendations:        For  Type 2 diabetes mellitus:    Drink plenty of fluids.  Fever increases the loss of fluids and can lead to dehydration.  Schedule a follow-up visit in 3 months.              Charge Capture:         Primary Diagnosis:     E11  Type 2 diabetes mellitus           Orders:      [id number]  Office/outpatient visit; established patient, level 4  (In-House)            3014F  Screening mammography results documented and reviewed (PV)1  (In-House)            APPTO  Appointment need  (In-House)              E03.9  Hypothyroidism, unspecified     N95  Menopausal and other perimenopausal disorders     I10  Essential (primary) hypertension     E78.2  Mixed hyperlipidemia     G47.33  Obstructive sleep apnea (adult) (pediatric)     F34.1  Dysthymic disorder     M85.8  Other specified disorders of bone density and structure         Other Orders:       2028F  Foot examination performed (includes examination through visual inspection, sensory exam with monofilament, and pulse exam - report when any of the three components are completed) (DM)4  (In-House)

## 2021-06-30 ENCOUNTER — TELEPHONE (OUTPATIENT)
Dept: FAMILY MEDICINE CLINIC | Age: 56
End: 2021-06-30

## 2021-06-30 DIAGNOSIS — Z12.31 ENCOUNTER FOR SCREENING MAMMOGRAM FOR MALIGNANT NEOPLASM OF BREAST: Primary | ICD-10-CM

## 2021-06-30 NOTE — TELEPHONE ENCOUNTER
Caller: Viky Swan    Relationship: Self    Best call back number: [phone]     What is the medical concern/diagnosis: N/A    What specialty or service is being requested: MAMMOGRAM    What is the provider, practice or medical service name: N/A    What is the office location: N/A    What is the office phone number: N/A    Any additional details: THE PATIENT STATED SHE RECEIVED A LETTER ON 06/28/2021 STATING SHE NEEDS TO HAVE A MAMMOGRAM DONE. SHE WOULD LIKE A CALL BACK TO SCHEDULE THIS ASAP.

## 2021-07-01 VITALS
BODY MASS INDEX: 44.72 KG/M2 | SYSTOLIC BLOOD PRESSURE: 117 MMHG | DIASTOLIC BLOOD PRESSURE: 70 MMHG | TEMPERATURE: 97.5 F | HEART RATE: 80 BPM | WEIGHT: 221.8 LBS | HEIGHT: 59 IN

## 2021-07-01 VITALS
TEMPERATURE: 97.6 F | HEART RATE: 81 BPM | WEIGHT: 219.8 LBS | SYSTOLIC BLOOD PRESSURE: 131 MMHG | BODY MASS INDEX: 44.31 KG/M2 | HEIGHT: 59 IN | DIASTOLIC BLOOD PRESSURE: 71 MMHG

## 2021-07-01 VITALS
DIASTOLIC BLOOD PRESSURE: 55 MMHG | HEIGHT: 59 IN | HEART RATE: 85 BPM | WEIGHT: 223.6 LBS | BODY MASS INDEX: 45.08 KG/M2 | SYSTOLIC BLOOD PRESSURE: 124 MMHG | TEMPERATURE: 98.5 F

## 2021-07-01 VITALS
HEART RATE: 78 BPM | BODY MASS INDEX: 46.08 KG/M2 | SYSTOLIC BLOOD PRESSURE: 117 MMHG | HEIGHT: 59 IN | DIASTOLIC BLOOD PRESSURE: 69 MMHG | TEMPERATURE: 97.1 F | WEIGHT: 228.6 LBS

## 2021-07-01 VITALS
BODY MASS INDEX: 46.12 KG/M2 | SYSTOLIC BLOOD PRESSURE: 109 MMHG | WEIGHT: 228.8 LBS | HEIGHT: 59 IN | TEMPERATURE: 97.9 F | DIASTOLIC BLOOD PRESSURE: 56 MMHG | HEART RATE: 86 BPM

## 2021-07-01 VITALS
DIASTOLIC BLOOD PRESSURE: 66 MMHG | BODY MASS INDEX: 46.89 KG/M2 | HEIGHT: 59 IN | SYSTOLIC BLOOD PRESSURE: 112 MMHG | TEMPERATURE: 97.9 F | HEART RATE: 71 BPM | WEIGHT: 232.6 LBS

## 2021-07-01 VITALS
DIASTOLIC BLOOD PRESSURE: 67 MMHG | HEART RATE: 80 BPM | SYSTOLIC BLOOD PRESSURE: 124 MMHG | HEIGHT: 59 IN | BODY MASS INDEX: 46.89 KG/M2 | WEIGHT: 232.6 LBS | TEMPERATURE: 97.8 F

## 2021-07-01 VITALS
SYSTOLIC BLOOD PRESSURE: 130 MMHG | TEMPERATURE: 98.1 F | BODY MASS INDEX: 45.44 KG/M2 | DIASTOLIC BLOOD PRESSURE: 68 MMHG | HEART RATE: 100 BPM | WEIGHT: 225.4 LBS | HEIGHT: 59 IN

## 2021-07-01 VITALS
HEART RATE: 81 BPM | SYSTOLIC BLOOD PRESSURE: 128 MMHG | TEMPERATURE: 98.4 F | BODY MASS INDEX: 45.96 KG/M2 | DIASTOLIC BLOOD PRESSURE: 69 MMHG | HEIGHT: 59 IN | WEIGHT: 228 LBS

## 2021-07-01 VITALS
DIASTOLIC BLOOD PRESSURE: 72 MMHG | WEIGHT: 230.2 LBS | HEIGHT: 59 IN | TEMPERATURE: 98.4 F | HEART RATE: 83 BPM | BODY MASS INDEX: 46.41 KG/M2 | SYSTOLIC BLOOD PRESSURE: 137 MMHG

## 2021-07-01 VITALS
TEMPERATURE: 98.2 F | HEART RATE: 85 BPM | HEIGHT: 59 IN | DIASTOLIC BLOOD PRESSURE: 64 MMHG | WEIGHT: 223 LBS | SYSTOLIC BLOOD PRESSURE: 133 MMHG | BODY MASS INDEX: 44.96 KG/M2

## 2021-07-01 VITALS
TEMPERATURE: 98.3 F | HEIGHT: 59 IN | SYSTOLIC BLOOD PRESSURE: 131 MMHG | BODY MASS INDEX: 46.2 KG/M2 | DIASTOLIC BLOOD PRESSURE: 74 MMHG | HEART RATE: 82 BPM | WEIGHT: 229.2 LBS

## 2021-07-01 VITALS
BODY MASS INDEX: 44.39 KG/M2 | HEIGHT: 59 IN | HEART RATE: 84 BPM | TEMPERATURE: 97.6 F | SYSTOLIC BLOOD PRESSURE: 151 MMHG | DIASTOLIC BLOOD PRESSURE: 78 MMHG | WEIGHT: 220.2 LBS

## 2021-07-01 VITALS
HEART RATE: 90 BPM | BODY MASS INDEX: 43.26 KG/M2 | HEIGHT: 59 IN | WEIGHT: 214.6 LBS | DIASTOLIC BLOOD PRESSURE: 63 MMHG | TEMPERATURE: 97.6 F | SYSTOLIC BLOOD PRESSURE: 124 MMHG

## 2021-07-02 VITALS
BODY MASS INDEX: 44.55 KG/M2 | HEIGHT: 59 IN | TEMPERATURE: 97.3 F | HEART RATE: 88 BPM | SYSTOLIC BLOOD PRESSURE: 129 MMHG | DIASTOLIC BLOOD PRESSURE: 58 MMHG | WEIGHT: 221 LBS

## 2021-07-02 VITALS
HEART RATE: 87 BPM | BODY MASS INDEX: 44.11 KG/M2 | DIASTOLIC BLOOD PRESSURE: 87 MMHG | HEIGHT: 59 IN | TEMPERATURE: 98.3 F | WEIGHT: 218.8 LBS | SYSTOLIC BLOOD PRESSURE: 137 MMHG

## 2021-07-02 VITALS
HEIGHT: 59 IN | DIASTOLIC BLOOD PRESSURE: 59 MMHG | HEART RATE: 80 BPM | TEMPERATURE: 96.4 F | BODY MASS INDEX: 44.19 KG/M2 | WEIGHT: 219.2 LBS | SYSTOLIC BLOOD PRESSURE: 123 MMHG

## 2021-07-02 VITALS
WEIGHT: 221.4 LBS | TEMPERATURE: 96.8 F | HEART RATE: 98 BPM | BODY MASS INDEX: 44.64 KG/M2 | DIASTOLIC BLOOD PRESSURE: 67 MMHG | SYSTOLIC BLOOD PRESSURE: 137 MMHG | HEIGHT: 59 IN

## 2021-07-02 VITALS
DIASTOLIC BLOOD PRESSURE: 73 MMHG | BODY MASS INDEX: 44.35 KG/M2 | SYSTOLIC BLOOD PRESSURE: 134 MMHG | WEIGHT: 220 LBS | TEMPERATURE: 98.3 F | HEART RATE: 84 BPM | HEIGHT: 59 IN

## 2021-07-06 ENCOUNTER — TRANSCRIBE ORDERS (OUTPATIENT)
Dept: ADMINISTRATIVE | Facility: HOSPITAL | Age: 56
End: 2021-07-06

## 2021-07-06 DIAGNOSIS — Z12.31 ENCOUNTER FOR SCREENING MAMMOGRAM FOR BREAST CANCER: Primary | ICD-10-CM

## 2021-07-21 ENCOUNTER — HOSPITAL ENCOUNTER (OUTPATIENT)
Dept: MAMMOGRAPHY | Facility: HOSPITAL | Age: 56
Discharge: HOME | End: 2021-07-21
Admitting: NURSE PRACTITIONER

## 2021-07-21 DIAGNOSIS — Z12.31 ENCOUNTER FOR SCREENING MAMMOGRAM FOR BREAST CANCER: ICD-10-CM

## 2021-07-21 PROCEDURE — 77067 SCR MAMMO BI INCL CAD: CPT

## 2021-07-21 PROCEDURE — 77063 BREAST TOMOSYNTHESIS BI: CPT

## 2021-07-22 DIAGNOSIS — R92.8 ABNORMALITY OF RIGHT BREAST ON SCREENING MAMMOGRAM: Primary | ICD-10-CM

## 2021-07-26 RX ORDER — LOSARTAN POTASSIUM 50 MG/1
TABLET ORAL
Qty: 90 TABLET | Refills: 0 | Status: SHIPPED | OUTPATIENT
Start: 2021-07-26 | End: 2021-10-20

## 2021-07-26 RX ORDER — HYDROCHLOROTHIAZIDE 12.5 MG/1
TABLET ORAL
Qty: 90 TABLET | Refills: 0 | Status: SHIPPED | OUTPATIENT
Start: 2021-07-26 | End: 2021-10-20

## 2021-08-02 ENCOUNTER — HOSPITAL ENCOUNTER (OUTPATIENT)
Dept: MAMMOGRAPHY | Facility: HOSPITAL | Age: 56
Discharge: HOME | End: 2021-08-02

## 2021-08-02 ENCOUNTER — HOSPITAL ENCOUNTER (OUTPATIENT)
Dept: ULTRASOUND IMAGING | Facility: HOSPITAL | Age: 56
Discharge: HOME | End: 2021-08-02

## 2021-08-02 DIAGNOSIS — R92.8 ABNORMAL MAMMOGRAM: ICD-10-CM

## 2021-08-02 DIAGNOSIS — R92.8 ABNORMALITY OF RIGHT BREAST ON SCREENING MAMMOGRAM: ICD-10-CM

## 2021-08-02 PROCEDURE — 76642 ULTRASOUND BREAST LIMITED: CPT

## 2021-08-02 PROCEDURE — G0279 TOMOSYNTHESIS, MAMMO: HCPCS

## 2021-08-02 PROCEDURE — 77065 DX MAMMO INCL CAD UNI: CPT

## 2021-08-05 ENCOUNTER — OFFICE VISIT (OUTPATIENT)
Dept: ORTHOPEDIC SURGERY | Facility: CLINIC | Age: 56
End: 2021-08-05

## 2021-08-05 VITALS — WEIGHT: 218 LBS | HEIGHT: 59 IN | BODY MASS INDEX: 43.95 KG/M2 | TEMPERATURE: 98 F

## 2021-08-05 DIAGNOSIS — M17.11 PRIMARY OSTEOARTHRITIS OF RIGHT KNEE: Primary | ICD-10-CM

## 2021-08-05 PROCEDURE — 99213 OFFICE O/P EST LOW 20 MIN: CPT | Performed by: ORTHOPAEDIC SURGERY

## 2021-08-05 PROCEDURE — 20610 DRAIN/INJ JOINT/BURSA W/O US: CPT | Performed by: ORTHOPAEDIC SURGERY

## 2021-08-05 RX ORDER — METFORMIN HYDROCHLORIDE 500 MG/1
TABLET, EXTENDED RELEASE ORAL
COMMUNITY
Start: 2021-06-01 | End: 2021-08-30

## 2021-08-05 RX ORDER — LEVOTHYROXINE SODIUM 50 MCG
TABLET ORAL
COMMUNITY
Start: 2021-06-01 | End: 2021-08-30

## 2021-08-05 RX ADMIN — METHYLPREDNISOLONE ACETATE 80 MG: 40 INJECTION, SUSPENSION INTRA-ARTICULAR; INTRALESIONAL; INTRAMUSCULAR; SOFT TISSUE at 14:37

## 2021-08-05 NOTE — PROGRESS NOTES
"Chief Complaint  Follow-up and Pain of the Right Knee    Subjective    History of Present Illness      Viky Swan is a 55 y.o. female who presents to [facility] for right knee pain and discomfort.  History of Present Illness the patient states that she has a tolerable amount of pain on the medial aspect of the right knee.  She does have some difficulty going up and down the steps.  She does has a sense of clicking and popping of the knee.  Most of the pain is on the medial side of the knee.  She does have pain with walking on and inclined surface as well.  The patient states that she has a burning sensation on the medial side of the knee.  She has been using a brace which is very helpful in preventing the knee from buckling and giving out.  She would like to defer knee replacement surgery as long as possible and I certainly agree with that.  Pain Location:  RIGHT knee  Radiation: none  Quality: burning  Intensity/Severity: varies between mild and severe  Duration: 1 year  Progression of symptoms: yes, progressive worsening  Onset quality: gradual   Timing: intermittent  Aggravating Factors: sitting, walking, standing  Alleviating Factors: NSAIDs, rest, brace, ice  Previous Episodes: yes  Associated Symptoms: pain, swelling  ADLs Affected: ambulating, work related activities, recreational activities/sports  Previous Treatment: brace       Objective   Vital Signs:   Temp 98 °F (36.7 °C)   Ht 149.9 cm (59.02\")   Wt 98.9 kg (218 lb)   BMI 44.01 kg/m²     Physical Exam  Physical Exam  Vitals signs and nursing note reviewed.   Constitutional:       Appearance: Normal appearance.   Pulmonary:      Effort: Pulmonary effort is normal.   Skin:     General: Skin is warm and dry.      Capillary Refill: Capillary refill takes less than 2 seconds.   Neurological:      General: No focal deficit present.      Mental Status: He is alert and oriented to person, place, and time. Mental status is " at baseline.   Psychiatric:         Mood and Affect: Mood normal.         Behavior: Behavior normal.         Thought Content: Thought content normal.         Judgment: Judgment normal.     Ortho Exam   Right knee (varus). Patient has crepitus throughout range of motion. Positive patellar grind test. Mild effusion. Lachman is negative. Pivot shift is negative. Anterior and posterior drawer signs are negative. Significant joint line tenderness is noted on the medial aspect of the knee. Patient has a varus orientation of the knee. There is fullness and tenderness in the Popliteal fossa. Mild distention of a Popliteal cyst is noted in this location. Range of motion in flexion is from 0-110 degrees. Neurovascular status is intact.  Dorsalis pedis and posterior tibial artery pulses are palpable. Common peroneal nerve function is well preserved. Patient's gait is cautious and antalgic. Skin and soft tissues are mildly swollen, consistent with synovitis and effusion. The patient has a significant limp with the first few steps after starting the gait cycle. Getting out of a chair takes a lot of effort due to pain on knee flexion.  Right knee (meniscus). The knee joint shows effusion with some thickening of the synovial membrane. There is tenderness over the meniscus. Apley's grinding test is positive over the joint line. Yadira's sign is positive with increased pain on torsional testing. There is a distinct click in mid flexion. Range of motion is from 0-110 degrees of flexion. No instability on medial or lateral testing. Anterior and posterior drawer testing is negative. Lachman test is negative. Joint line tenderness is present to direct palpation. There is some tenderness over the medial face of the tibia just distal to the joint line. The dorsalis pedis and posterior tibial artery pulses are palpable. Common peroneal nerve function is well preserved. Gait is cautious and somewhat antalgic. Full extension causes the  patient to have quite a bit of pain and discomfort.         Result Review :   The following data was reviewed by: Ambrocio Rivera MD on 08/05/2021:  Radiologic studies - see below for interpretation  Reviewed MRI report of RIGHT KNEE, performed at  [facility] on 04/2021, summary of impression below:            Large Joint Arthrocentesis: R knee  Date/Time: 8/5/2021 2:37 PM  Consent given by: patient  Site marked: site marked  Timeout: Immediately prior to procedure a time out was called to verify the correct patient, procedure, equipment, support staff and site/side marked as required   Supporting Documentation  Indications: pain   Procedure Details  Location: knee - R knee  Preparation: Patient was prepped and draped in the usual sterile fashion  Needle size: 25 G  Approach: anteromedial  Medications administered: 2 mL lidocaine (cardiac); 80 mg methylPREDNISolone acetate 40 MG/ML  Patient tolerance: patient tolerated the procedure well with no immediate complications                 Assessment   Assessment and Plan    Diagnoses and all orders for this visit:    1. Primary osteoarthritis of right knee (Primary)    Other orders  -     Large Joint Arthrocentesis: R knee          Follow Up   · Compression/brace to the knee to prevent it from buckling and giving out.  · Injected patient's right knee from an anteromedial approach with a steroid.  · Calcium and vitamin D for bone health.  · Glucosamine, chondroitin and turmeric for cartilage health.  · Intra-articular viscosupplementation injections discussed with the patient.  · The patient was counseled regarding reduction of BMI including, but not limited to the following: Reduction of portion sizes, reduction of carbohydrate intake, decreased calorie count, increased physical exercise and activity, counseling with a professional such as a registered dietitian and consultation with a bariatric surgeon for possible bariatric procedure.  Should the patient  decide to proceed with a bariatric procedure this would not be considered a cosmetic procedure for this patient, but a means to improve the quality of life and to improve the musculoskeletal function.  Patient's current BMI is 44.01 and her weight is 218 pounds and would need to lose weight which is required for elective orthopedic surgery to minimize perioperative complications.  · Rest, ice, compression, and elevation (RICE) therapy  · Stretching and strengthening exercises of the quads and hamstrings  · OTC Alternate Ibuprofen and Tylenol as needed  · Follow up in 3 month(s)  • Patient was given instructions and counseling regarding her condition or for health maintenance advice. Please see specific information pulled into the AVS if appropriate.     Ambrocio Rivera MD   Date of Encounter: 8/5/2021       EMR Dragon/Transcription disclaimer:  Much of this encounter note is an electronic transcription/translation of spoken language to printed text. The electronic translation of spoken language may permit erroneous, or at times, nonsensical words or phrases to be inadvertently transcribed; Although I have reviewed the note for such errors, some may still exist.

## 2021-08-14 PROBLEM — M17.11 PRIMARY OSTEOARTHRITIS OF RIGHT KNEE: Status: ACTIVE | Noted: 2021-08-14

## 2021-08-14 RX ORDER — METHYLPREDNISOLONE ACETATE 40 MG/ML
80 INJECTION, SUSPENSION INTRA-ARTICULAR; INTRALESIONAL; INTRAMUSCULAR; SOFT TISSUE
Status: COMPLETED | OUTPATIENT
Start: 2021-08-05 | End: 2021-08-05

## 2021-08-24 DIAGNOSIS — E78.2 MIXED HYPERLIPIDEMIA: Primary | ICD-10-CM

## 2021-08-24 RX ORDER — ROSUVASTATIN CALCIUM 10 MG/1
TABLET, COATED ORAL
Qty: 90 TABLET | Refills: 0 | Status: SHIPPED | OUTPATIENT
Start: 2021-08-24 | End: 2021-10-22 | Stop reason: SDUPTHER

## 2021-08-30 RX ORDER — BUPROPION HYDROCHLORIDE 150 MG/1
TABLET, EXTENDED RELEASE ORAL
Qty: 180 TABLET | Refills: 0 | Status: SHIPPED | OUTPATIENT
Start: 2021-08-30 | End: 2021-10-22 | Stop reason: SDUPTHER

## 2021-08-30 RX ORDER — LEVOTHYROXINE SODIUM 0.05 MG/1
TABLET ORAL
Qty: 90 TABLET | Refills: 0 | Status: SHIPPED | OUTPATIENT
Start: 2021-08-30 | End: 2021-10-22 | Stop reason: SDUPTHER

## 2021-08-30 RX ORDER — METFORMIN HYDROCHLORIDE 500 MG/1
TABLET, EXTENDED RELEASE ORAL
Qty: 360 TABLET | Refills: 0 | Status: SHIPPED | OUTPATIENT
Start: 2021-08-30 | End: 2021-10-22 | Stop reason: SDUPTHER

## 2021-09-29 RX ORDER — SITAGLIPTIN 100 MG/1
TABLET, FILM COATED ORAL
Qty: 90 TABLET | Refills: 1 | Status: SHIPPED | OUTPATIENT
Start: 2021-09-29 | End: 2021-10-22 | Stop reason: SDUPTHER

## 2021-10-20 RX ORDER — HYDROCHLOROTHIAZIDE 12.5 MG/1
TABLET ORAL
Qty: 90 TABLET | Refills: 0 | Status: SHIPPED | OUTPATIENT
Start: 2021-10-20 | End: 2021-10-22 | Stop reason: SDUPTHER

## 2021-10-20 RX ORDER — LOSARTAN POTASSIUM 50 MG/1
TABLET ORAL
Qty: 90 TABLET | Refills: 0 | Status: SHIPPED | OUTPATIENT
Start: 2021-10-20 | End: 2021-10-22 | Stop reason: SDUPTHER

## 2021-10-22 ENCOUNTER — OFFICE VISIT (OUTPATIENT)
Dept: FAMILY MEDICINE CLINIC | Age: 56
End: 2021-10-22

## 2021-10-22 VITALS
HEIGHT: 59 IN | SYSTOLIC BLOOD PRESSURE: 138 MMHG | TEMPERATURE: 97.5 F | DIASTOLIC BLOOD PRESSURE: 76 MMHG | HEART RATE: 85 BPM | WEIGHT: 219.8 LBS | BODY MASS INDEX: 44.31 KG/M2 | OXYGEN SATURATION: 96 %

## 2021-10-22 DIAGNOSIS — Z11.59 SCREENING FOR VIRAL DISEASE: ICD-10-CM

## 2021-10-22 DIAGNOSIS — G47.33 OBSTRUCTIVE SLEEP APNEA (ADULT) (PEDIATRIC): ICD-10-CM

## 2021-10-22 DIAGNOSIS — E11.9 TYPE 2 DIABETES MELLITUS WITHOUT COMPLICATION, WITHOUT LONG-TERM CURRENT USE OF INSULIN (HCC): ICD-10-CM

## 2021-10-22 DIAGNOSIS — I10 ESSENTIAL (PRIMARY) HYPERTENSION: ICD-10-CM

## 2021-10-22 DIAGNOSIS — F34.1 DYSTHYMIC DISORDER: Primary | ICD-10-CM

## 2021-10-22 DIAGNOSIS — E03.9 ACQUIRED HYPOTHYROIDISM: ICD-10-CM

## 2021-10-22 DIAGNOSIS — E78.2 MIXED HYPERLIPIDEMIA: ICD-10-CM

## 2021-10-22 DIAGNOSIS — M85.89 OTHER SPECIFIED DISORDERS OF BONE DENSITY AND STRUCTURE, MULTIPLE SITES: ICD-10-CM

## 2021-10-22 PROBLEM — N95.8 OTHER SPECIFIED MENOPAUSAL AND PERIMENOPAUSAL DISORDERS: Status: ACTIVE | Noted: 2021-10-22

## 2021-10-22 PROBLEM — E83.52 HYPERCALCEMIA: Status: ACTIVE | Noted: 2021-10-22

## 2021-10-22 PROBLEM — E83.52 HYPERCALCEMIA: Status: RESOLVED | Noted: 2021-10-22 | Resolved: 2021-10-22

## 2021-10-22 PROBLEM — E53.9 VITAMIN B DEFICIENCY, UNSPECIFIED: Status: ACTIVE | Noted: 2021-10-22

## 2021-10-22 PROBLEM — R19.5 OTHER FECAL ABNORMALITIES: Status: ACTIVE | Noted: 2021-10-22

## 2021-10-22 PROBLEM — E66.9 OBESITY, UNSPECIFIED: Status: ACTIVE | Noted: 2021-10-22

## 2021-10-22 PROBLEM — E55.9 VITAMIN D DEFICIENCY, UNSPECIFIED: Status: ACTIVE | Noted: 2021-10-22

## 2021-10-22 PROCEDURE — 99214 OFFICE O/P EST MOD 30 MIN: CPT | Performed by: NURSE PRACTITIONER

## 2021-10-22 RX ORDER — ASPIRIN 81 MG/1
81 TABLET ORAL DAILY
COMMUNITY

## 2021-10-22 RX ORDER — CETIRIZINE HYDROCHLORIDE 10 MG/1
10 TABLET ORAL DAILY
COMMUNITY
End: 2021-10-22

## 2021-10-22 RX ORDER — MULTIPLE VITAMINS W/ MINERALS TAB 9MG-400MCG
1 TAB ORAL DAILY
COMMUNITY

## 2021-10-22 RX ORDER — LEVOTHYROXINE SODIUM 0.05 MG/1
50 TABLET ORAL DAILY
Qty: 90 TABLET | Refills: 1 | Status: SHIPPED | OUTPATIENT
Start: 2021-10-22 | End: 2022-05-04

## 2021-10-22 RX ORDER — LOSARTAN POTASSIUM 50 MG/1
50 TABLET ORAL DAILY
Qty: 90 TABLET | Refills: 1 | Status: SHIPPED | OUTPATIENT
Start: 2021-10-22 | End: 2022-06-28

## 2021-10-22 RX ORDER — METFORMIN HYDROCHLORIDE 500 MG/1
1000 TABLET, EXTENDED RELEASE ORAL 2 TIMES DAILY
Qty: 360 TABLET | Refills: 1 | Status: SHIPPED | OUTPATIENT
Start: 2021-10-22 | End: 2022-05-04

## 2021-10-22 RX ORDER — ROSUVASTATIN CALCIUM 10 MG/1
10 TABLET, COATED ORAL DAILY
Qty: 90 TABLET | Refills: 1 | Status: SHIPPED | OUTPATIENT
Start: 2021-10-22 | End: 2022-04-28

## 2021-10-22 RX ORDER — HYDROCHLOROTHIAZIDE 12.5 MG/1
12.5 TABLET ORAL DAILY
Qty: 90 TABLET | Refills: 1 | Status: SHIPPED | OUTPATIENT
Start: 2021-10-22 | End: 2022-06-28

## 2021-10-22 RX ORDER — CHLORAL HYDRATE 500 MG
CAPSULE ORAL
COMMUNITY

## 2021-10-22 RX ORDER — BUPROPION HYDROCHLORIDE 150 MG/1
150 TABLET, EXTENDED RELEASE ORAL 2 TIMES DAILY
Qty: 180 TABLET | Refills: 1 | Status: SHIPPED | OUTPATIENT
Start: 2021-10-22 | End: 2022-05-04

## 2021-10-22 NOTE — PROGRESS NOTES
Chief Complaint  Viky Swan presents to [facility] for Type 2 DM, Hypothyroidism, Hypertension, and Mental Health Problem    Subjective          History of Present Illness    Viky is following up on her chronic issues today.   History of DM2. Last HgbA1C was 7.2  3/30/2021 . Currently taking Metformin and Januvia. She is on ARB, statin, and baby Aspirin as well. Eye exam UTD 12/11/2020 with Dr George.  On Levothyroxine for hypothyroidism. TSH last normal.   On Losartan/HCTZ for HTN. Doing well.   On Crestor and fish oil for mixed hyperlipidemia. Tolerating well.   Hx AKIN. She is compliant with CPAP machine and has noticed improvement in her sleep since starting. She is followed by sleep specialist.    History of depression with anxiety. Doing well. Has been taking Wellbutrin for over 5 years. Denies suicidal ideation.   Osteopenia noted on bone density scan. She is taking OTC Vitamin D and calcium supplement.   She is following with Dr Rivera for her knee. Has follow up scheduled and may have another injection at next visit.     Review of Systems      Allergies   Allergen Reactions   • Novocain [Procaine] Unknown - Low Severity      Past Medical History:   Diagnosis Date   • Dysthymic disorder    • Essential (primary) hypertension    • Hypercalcemia    • Hypothyroidism, unspecified    • Mixed hyperlipidemia    • Obesity, unspecified    • Other specified menopausal and perimenopausal disorders    • Vitamin B deficiency, unspecified    • Vitamin D deficiency, unspecified      Current Outpatient Medications   Medication Sig Dispense Refill   • aspirin 81 MG EC tablet Take 81 mg by mouth Daily.     • buPROPion SR (WELLBUTRIN SR) 150 MG 12 hr tablet Take 1 tablet by mouth 2 (Two) Times a Day. 180 tablet 1   • Calcium Carbonate 1500 (600 Ca) MG tablet Take 1 tablet by mouth Daily. 90 tablet 1   • DICLOFENAC PO Take 75 mg by mouth Daily.     • hydroCHLOROthiazide (HYDRODIURIL) 12.5  "MG tablet Take 1 tablet by mouth Daily. 90 tablet 1   • levothyroxine (SYNTHROID, LEVOTHROID) 50 MCG tablet Take 1 tablet by mouth Daily. 90 tablet 1   • losartan (COZAAR) 50 MG tablet Take 1 tablet by mouth Daily. 90 tablet 1   • metFORMIN ER (GLUCOPHAGE-XR) 500 MG 24 hr tablet Take 2 tablets by mouth 2 (Two) Times a Day. 360 tablet 1   • multivitamin with minerals tablet tablet Take 1 tablet by mouth Daily.     • Omega-3 Fatty Acids (fish oil) 1000 MG capsule capsule Take  by mouth Daily With Breakfast.     • rosuvastatin (CRESTOR) 10 MG tablet Take 1 tablet by mouth Daily. 90 tablet 1   • SITagliptin (Januvia) 100 MG tablet Take 1 tablet by mouth Daily. 90 tablet 1   • vitamin D3 125 MCG (5000 UT) capsule capsule Take 2 capsules by mouth Daily.       No current facility-administered medications for this visit.     Past Surgical History:   Procedure Laterality Date   • TUBAL ABDOMINAL LIGATION        Social History     Tobacco Use   • Smoking status: Never Smoker   • Smokeless tobacco: Never Used   Vaping Use   • Vaping Use: Never used   Substance Use Topics   • Alcohol use: Not Currently   • Drug use: Never     Family History   Problem Relation Age of Onset   • Lung cancer Father    • Hypertension Sister      Health Maintenance Due   Topic Date Due   • Pneumococcal Vaccine 0-64 (1 of 2 - PPSV23) Never done   • ZOSTER VACCINE (1 of 2) Never done   • HEPATITIS C SCREENING  Never done   • DIABETIC FOOT EXAM  Never done   • HEMOGLOBIN A1C  09/30/2021      Immunization History   Administered Date(s) Administered   • COVID-19 (MODERNA) 01/27/2021, 02/24/2021   • FluLaval/Fluarix/Fluzone >6 10/19/2021   • Hepatitis A 05/07/2018, 05/18/2018, 11/30/2018, 01/01/2019   • Influenza, Unspecified 10/21/2020   • Td 10/30/2003   • Tdap 07/19/2019        Objective     Vitals:    10/22/21 1316   BP: 138/76   Pulse: 85   Temp: 97.5 °F (36.4 °C)   SpO2: 96%   Weight: 99.7 kg (219 lb 12.8 oz)   Height: 149.9 cm (59\")     Body mass " index is 44.39 kg/m².     Physical Exam  Vitals reviewed.   Constitutional:       General: She is not in acute distress.     Appearance: Normal appearance. She is well-developed.   HENT:      Head: Normocephalic and atraumatic.   Cardiovascular:      Rate and Rhythm: Normal rate and regular rhythm.      Pulses:           Dorsalis pedis pulses are 2+ on the right side and 2+ on the left side.   Pulmonary:      Effort: Pulmonary effort is normal.      Breath sounds: Normal breath sounds.   Feet:      Right foot:      Protective Sensation: 3 sites tested. 3 sites sensed.      Skin integrity: Skin integrity normal. No ulcer or blister.      Toenail Condition: Right toenails are normal.      Left foot:      Protective Sensation: 3 sites tested. 3 sites sensed.      Skin integrity: Skin integrity normal. No ulcer or blister.      Toenail Condition: Left toenails are normal.      Comments:      Neurological:      Mental Status: She is alert and oriented to person, place, and time.   Psychiatric:         Mood and Affect: Mood and affect normal.       Diabetic Foot Exam Performed       Result Review :     The following data was reviewed by: AYAD Mobley on 10/22/2021:          TSH (03/30/2021 08:52)  Hemoglobin A1c (03/30/2021 08:52)  Microalbumin / Creatinine Urine Ratio - (03/30/2021 08:52)  Lipid Panel (03/30/2021 08:52)  Comprehensive Metabolic Panel (03/30/2021 08:52)                  Assessment and Plan      Diagnoses and all orders for this visit:    1. Dysthymic disorder (Primary)  Comments:  Medical condition is stable.  Continue same therapy.  Will recheck at next regular appointment.  Orders:  -     buPROPion SR (WELLBUTRIN SR) 150 MG 12 hr tablet; Take 1 tablet by mouth 2 (Two) Times a Day.  Dispense: 180 tablet; Refill: 1    2. Essential (primary) hypertension  Comments:  Medical condition is stable.  Continue same therapy.  Will recheck at next regular appointment.  Orders:  -     losartan (COZAAR) 50  MG tablet; Take 1 tablet by mouth Daily.  Dispense: 90 tablet; Refill: 1  -     hydroCHLOROthiazide (HYDRODIURIL) 12.5 MG tablet; Take 1 tablet by mouth Daily.  Dispense: 90 tablet; Refill: 1    3. Acquired hypothyroidism  Comments:  Stable.  Rechecking lab.  Orders:  -     levothyroxine (SYNTHROID, LEVOTHROID) 50 MCG tablet; Take 1 tablet by mouth Daily.  Dispense: 90 tablet; Refill: 1  -     Cancel: TSH; Future  -     TSH; Future    4. Mixed hyperlipidemia  Comments:  Stable.  Continue current medication  Orders:  -     rosuvastatin (CRESTOR) 10 MG tablet; Take 1 tablet by mouth Daily.  Dispense: 90 tablet; Refill: 1    5. Obstructive sleep apnea (adult) (pediatric)  Comments:  Compliant with CPAP.  Continue follow-up with sleep specialist as per their recommendations.    6. Other specified disorders of bone density and structure, multiple sites  Comments:  Continue calcium and vitamin D  Orders:  -     Calcium Carbonate 1500 (600 Ca) MG tablet; Take 1 tablet by mouth Daily.  Dispense: 90 tablet; Refill: 1    7. Type 2 diabetes mellitus without complication, without long-term current use of insulin ([facility])  Comments:  Instructed in use of glucometer, adherance to Low carb, NCS diet, daily foot self-inspection, annual eye exams, and need for yearly flu shots.      Orders:  -     metFORMIN ER (GLUCOPHAGE-XR) 500 MG 24 hr tablet; Take 2 tablets by mouth 2 (Two) Times a Day.  Dispense: 360 tablet; Refill: 1  -     Discontinue: SITagliptin (Januvia) 100 MG tablet; Take 1 tablet by mouth Daily.  Dispense: 90 tablet; Refill: 1  -     Cancel: Comprehensive metabolic panel; Future  -     Cancel: Hemoglobin A1c; Future  -     Comprehensive metabolic panel; Future  -     Hemoglobin A1c; Future  -     SITagliptin (Januvia) 100 MG tablet; Take 1 tablet by mouth Daily.  Dispense: 90 tablet; Refill: 1    8. Screening for viral disease  -     Hepatitis C antibody; Future              Follow Up     Return in about 6 months (around  4/22/2022).

## 2021-10-29 ENCOUNTER — CLINICAL SUPPORT (OUTPATIENT)
Dept: FAMILY MEDICINE CLINIC | Age: 56
End: 2021-10-29

## 2021-10-29 DIAGNOSIS — Z23 ENCOUNTER FOR IMMUNIZATION: Primary | ICD-10-CM

## 2021-10-29 PROCEDURE — 90732 PPSV23 VACC 2 YRS+ SUBQ/IM: CPT | Performed by: NURSE PRACTITIONER

## 2021-10-29 PROCEDURE — 90471 IMMUNIZATION ADMIN: CPT | Performed by: NURSE PRACTITIONER

## 2021-11-03 LAB
ALBUMIN SERPL-MCNC: 4.8 G/DL (ref 3.8–4.9)
ALBUMIN/GLOB SERPL: 2.4 {RATIO} (ref 1.2–2.2)
ALP SERPL-CCNC: 89 IU/L (ref 44–121)
ALT SERPL-CCNC: 30 IU/L (ref 0–32)
AST SERPL-CCNC: 26 IU/L (ref 0–40)
BILIRUB SERPL-MCNC: 0.3 MG/DL (ref 0–1.2)
BUN SERPL-MCNC: 18 MG/DL (ref 6–24)
BUN/CREAT SERPL: 27 (ref 9–23)
CALCIUM SERPL-MCNC: 9.9 MG/DL (ref 8.7–10.2)
CHLORIDE SERPL-SCNC: 102 MMOL/L (ref 96–106)
CO2 SERPL-SCNC: 23 MMOL/L (ref 20–29)
CREAT SERPL-MCNC: 0.67 MG/DL (ref 0.57–1)
GLOBULIN SER CALC-MCNC: 2 G/DL (ref 1.5–4.5)
GLUCOSE SERPL-MCNC: 164 MG/DL (ref 65–99)
HBA1C MFR BLD: 7.6 % (ref 4.8–5.6)
HCV AB S/CO SERPL IA: <0.1 S/CO RATIO (ref 0–0.9)
POTASSIUM SERPL-SCNC: 4.4 MMOL/L (ref 3.5–5.2)
PROT SERPL-MCNC: 6.8 G/DL (ref 6–8.5)
SODIUM SERPL-SCNC: 140 MMOL/L (ref 134–144)
TSH SERPL DL<=0.005 MIU/L-ACNC: 2.78 UIU/ML (ref 0.45–4.5)

## 2021-11-04 ENCOUNTER — OFFICE VISIT (OUTPATIENT)
Dept: ORTHOPEDIC SURGERY | Facility: CLINIC | Age: 56
End: 2021-11-04

## 2021-11-04 VITALS — TEMPERATURE: 97 F | BODY MASS INDEX: 44.15 KG/M2 | HEIGHT: 59 IN | WEIGHT: 219 LBS

## 2021-11-04 DIAGNOSIS — M17.11 PRIMARY OSTEOARTHRITIS OF RIGHT KNEE: Primary | ICD-10-CM

## 2021-11-04 PROCEDURE — 20610 DRAIN/INJ JOINT/BURSA W/O US: CPT | Performed by: ORTHOPAEDIC SURGERY

## 2021-11-04 RX ADMIN — TRIAMCINOLONE ACETONIDE 80 MG: 40 INJECTION, SUSPENSION INTRA-ARTICULAR; INTRAMUSCULAR at 13:22

## 2021-11-04 NOTE — PROGRESS NOTES
"Chief Complaint  Follow-up and Pain of the Right Knee and Injections    Subjective    History of Present Illness      Viky Swan is a 56 y.o. female who presents to [facility] for Patient returns today for right knee pain.  Her pain is located over the medial aspect of the joint.  The pain has been progressive in nature and remains intermittent .  Her pain is worsened by going up and down stairs, kneeling, rising after sitting. There has been improvement in the past with injections.       Objective   Vital Signs:   Temp 97 °F (36.1 °C)   Ht 149.9 cm (59.02\")   Wt 99.3 kg (219 lb)   BMI 44.21 kg/m²     Physical Exam  56 y.o. female is awake, alert, oriented, in no acute distress and well developed, well nourished.  Ortho Exam   RIGHT knee  Right knee (meniscus). The knee joint shows effusion with some thickening of the synovial membrane. There is tenderness over the meniscus. Apley's grinding test is positive over the joint line. Yadira's sign is positive with increased pain on torsional testing. There is a distinct click in mid flexion. Range of motion is from 0-110 degrees of flexion. No instability on medial or lateral testing. Anterior and posterior drawer testing is negative. Lachman test is negative. Joint line tenderness is present to direct palpation. There is some tenderness over the medial face of the tibia just distal to the joint line. The dorsalis pedis and posterior tibial artery pulses are palpable. Common peroneal nerve function is well preserved. Gait is cautious and somewhat antalgic. Full extension causes the patient to have quite a bit of pain and discomfort.         Result Review :                  Large Joint Arthrocentesis: R knee  Date/Time: 11/4/2021 1:22 PM  Consent given by: patient  Site marked: site marked  Timeout: Immediately prior to procedure a time out was called to verify the correct patient, procedure, equipment, support staff and site/side " marked as required   Supporting Documentation  Indications: pain and joint swelling   Procedure Details  Location: knee - R knee  Preparation: Patient was prepped and draped in the usual sterile fashion  Needle size: 25 G  Approach: anteromedial  Medications administered: 80 mg triamcinolone acetonide 40 MG/ML; 2 mL lidocaine (cardiac)  Patient tolerance: patient tolerated the procedure well with no immediate complications               Assessment   Assessment and Plan    Problem List Items Addressed This Visit        Other    Primary osteoarthritis of right knee - Primary          Follow Up   · Injected patient's right knee joint(s)with Depo-Medrol from an anteromedial approach   · Compression/brace   · Rest, ice, compression, and elevation (RICE) therapy  · OTC Alternate Ibuprofen and Tylenol as needed  · Follow up in 3 month(s)  • Patient was given instructions and counseling regarding her condition or for health maintenance advice. Please see specific information pulled into the AVS if appropriate.     Ambrocio Rivera MD   Date of Encounter: 11/4/2021       EMR Dragon/Transcription disclaimer:  Much of this encounter note is an electronic transcription/translation of spoken language to printed text. The electronic translation of spoken language may permit erroneous, or at times, nonsensical words or phrases to be inadvertently transcribed; Although I have reviewed the note for such errors, some may still exist.

## 2021-11-26 RX ORDER — TRIAMCINOLONE ACETONIDE 40 MG/ML
80 INJECTION, SUSPENSION INTRA-ARTICULAR; INTRAMUSCULAR
Status: DISCONTINUED | OUTPATIENT
Start: 2021-11-04 | End: 2021-11-26 | Stop reason: HOSPADM

## 2021-12-15 ENCOUNTER — OFFICE VISIT (OUTPATIENT)
Dept: FAMILY MEDICINE CLINIC | Age: 56
End: 2021-12-15

## 2021-12-15 VITALS
DIASTOLIC BLOOD PRESSURE: 99 MMHG | SYSTOLIC BLOOD PRESSURE: 152 MMHG | HEIGHT: 59 IN | BODY MASS INDEX: 43.75 KG/M2 | OXYGEN SATURATION: 98 % | TEMPERATURE: 98 F | WEIGHT: 217 LBS | HEART RATE: 81 BPM

## 2021-12-15 DIAGNOSIS — Z12.31 ENCOUNTER FOR SCREENING MAMMOGRAM FOR MALIGNANT NEOPLASM OF BREAST: ICD-10-CM

## 2021-12-15 DIAGNOSIS — Z00.00 ANNUAL PHYSICAL EXAM: Primary | ICD-10-CM

## 2021-12-15 DIAGNOSIS — F34.1 DYSTHYMIC DISORDER: ICD-10-CM

## 2021-12-15 DIAGNOSIS — L08.9 SKIN INFECTION: ICD-10-CM

## 2021-12-15 DIAGNOSIS — E03.9 ACQUIRED HYPOTHYROIDISM: ICD-10-CM

## 2021-12-15 DIAGNOSIS — L60.0 INGROWN NAIL OF GREAT TOE OF LEFT FOOT: ICD-10-CM

## 2021-12-15 DIAGNOSIS — E11.9 TYPE 2 DIABETES MELLITUS WITHOUT COMPLICATION, WITHOUT LONG-TERM CURRENT USE OF INSULIN (HCC): ICD-10-CM

## 2021-12-15 DIAGNOSIS — G47.33 OBSTRUCTIVE SLEEP APNEA (ADULT) (PEDIATRIC): ICD-10-CM

## 2021-12-15 DIAGNOSIS — Z78.0 POSTMENOPAUSAL: ICD-10-CM

## 2021-12-15 PROCEDURE — 99396 PREV VISIT EST AGE 40-64: CPT | Performed by: NURSE PRACTITIONER

## 2021-12-15 RX ORDER — CEPHALEXIN 500 MG/1
500 CAPSULE ORAL 2 TIMES DAILY
Qty: 10 CAPSULE | Refills: 0 | Status: SHIPPED | OUTPATIENT
Start: 2021-12-15 | End: 2022-11-07

## 2021-12-15 NOTE — PROGRESS NOTES
Chief Complaint  Viky Swan presents to [facility] for Annual Exam    Subjective          History of Present Illness    Viky is here today for annual preventive exam. She is menopausal. She performs breast self-exams occasionally.   Her last Pap smear was 3/8/2021 and was normal.   Her last mammogram was 7/22/2021 and 6 month follow up was recommended.   Her last DEXA was 2/19/2019 and showed osteopenia.   Preventative Health updated today.  Ms. Swan denies any history of abnormal Pap smears. Had colonoscopy 1/23/2018 with Dr Connelly that was normal. She has never smoked.   UTD Tdap, PNA, influenza, and covid vaccines.  She will be getting zoster vaccine after she gets her covid booster.     C/o left big toe with ingrown nail. Reddened. Has been soaking in hot water.     History of DM2. Last HgbA1C was 7.6  11/2/2021 . Currently taking Metformin and Januvia. She is on ARB, statin, and baby Aspirin as well. Last eye exam 12/11/2020 with Dr George. She is scheduled next week.   On Levothyroxine for hypothyroidism. Last TSH normal.   On Losartan/HCTZ for HTN.    On Crestor and fish oil for mixed hyperlipidemia. Tolerating well.   Hx AKIN. She is compliant with CPAP machine and has noticed improvement in her sleep since starting. She is followed by sleep specialist.    History of depression with anxiety. Doing well. Has been taking Wellbutrin for over 5 years. Denies suicidal ideation.   Osteopenia noted on bone density scan. She is taking OTC Vitamin D and calcium supplement.   She is following with Dr Rivera for her knee. Had injection in November.     Review of Systems   Constitutional: Negative for chills and fever.   HENT: Negative for ear pain and sore throat.    Eyes: Negative for blurred vision and redness.   Respiratory: Negative for cough, shortness of breath and wheezing.    Cardiovascular: Negative for chest pain and palpitations.   Gastrointestinal: Negative for  abdominal pain, constipation, diarrhea, nausea and vomiting.   Genitourinary: Negative for dysuria, frequency and urgency.   Skin:        Left great toe redness   Neurological: Negative for dizziness and headache.   Psychiatric/Behavioral: Negative for suicidal ideas and depressed mood.         Allergies   Allergen Reactions   • Novocain [Procaine] Unknown - Low Severity      Past Medical History:   Diagnosis Date   • Dysthymic disorder    • Essential (primary) hypertension    • Hypercalcemia    • Hypothyroidism, unspecified    • Mixed hyperlipidemia    • Obesity, unspecified    • Other specified menopausal and perimenopausal disorders    • Vitamin B deficiency, unspecified    • Vitamin D deficiency, unspecified      Current Outpatient Medications   Medication Sig Dispense Refill   • aspirin 81 MG EC tablet Take 81 mg by mouth Daily.     • buPROPion SR (WELLBUTRIN SR) 150 MG 12 hr tablet Take 1 tablet by mouth 2 (Two) Times a Day. 180 tablet 1   • Calcium Carbonate 1500 (600 Ca) MG tablet Take 1 tablet by mouth Daily. 90 tablet 1   • cephalexin (Keflex) 500 MG capsule Take 1 capsule by mouth 2 (Two) Times a Day. 10 capsule 0   • DICLOFENAC PO Take 75 mg by mouth Daily.     • hydroCHLOROthiazide (HYDRODIURIL) 12.5 MG tablet Take 1 tablet by mouth Daily. 90 tablet 1   • levothyroxine (SYNTHROID, LEVOTHROID) 50 MCG tablet Take 1 tablet by mouth Daily. 90 tablet 1   • losartan (COZAAR) 50 MG tablet Take 1 tablet by mouth Daily. 90 tablet 1   • metFORMIN ER (GLUCOPHAGE-XR) 500 MG 24 hr tablet Take 2 tablets by mouth 2 (Two) Times a Day. 360 tablet 1   • multivitamin with minerals tablet tablet Take 1 tablet by mouth Daily.     • Omega-3 Fatty Acids (fish oil) 1000 MG capsule capsule Take  by mouth Daily With Breakfast.     • rosuvastatin (CRESTOR) 10 MG tablet Take 1 tablet by mouth Daily. 90 tablet 1   • SITagliptin (Januvia) 100 MG tablet Take 1 tablet by mouth Daily. 90 tablet 1   • vitamin D3 125 MCG (5000 UT)  "capsule capsule Take 2 capsules by mouth Daily.       No current facility-administered medications for this visit.     Past Surgical History:   Procedure Laterality Date   • TUBAL ABDOMINAL LIGATION        Social History     Tobacco Use   • Smoking status: Never Smoker   • Smokeless tobacco: Never Used   Vaping Use   • Vaping Use: Never used   Substance Use Topics   • Alcohol use: Not Currently   • Drug use: Never     Family History   Problem Relation Age of Onset   • Lung cancer Father    • Hypertension Sister      Health Maintenance Due   Topic Date Due   • ZOSTER VACCINE (1 of 2) Never done   • COVID-19 Vaccine (3 - Booster for Moderna series) 08/24/2021   • DIABETIC EYE EXAM  12/11/2021      Immunization History   Administered Date(s) Administered   • COVID-19 (MODERNA) 1st, 2nd, 3rd Dose Only 01/27/2021, 02/24/2021   • FluLaval/Fluarix/Fluzone >6 10/19/2021   • Hepatitis A 05/07/2018, 05/18/2018, 11/30/2018, 01/01/2019   • Influenza, Unspecified 10/21/2020   • Pneumococcal Polysaccharide (PPSV23) 10/29/2021   • Td 10/30/2003   • Tdap 07/19/2019        Objective     Vitals:    12/15/21 1156   BP: 152/99   Pulse: 81   Temp: 98 °F (36.7 °C)   SpO2: 98%   Weight: 98.4 kg (217 lb)   Height: 149.9 cm (59\")     Body mass index is 43.83 kg/m².     Physical Exam  Vitals reviewed.   Constitutional:       General: She is not in acute distress.     Appearance: Normal appearance. She is well-developed.   HENT:      Head: Normocephalic and atraumatic.      Right Ear: Hearing, tympanic membrane and ear canal normal.      Left Ear: Hearing, tympanic membrane and ear canal normal.      Mouth/Throat:      Mouth: Mucous membranes are moist.   Eyes:      Extraocular Movements: Extraocular movements intact.      Pupils: Pupils are equal, round, and reactive to light.   Cardiovascular:      Rate and Rhythm: Normal rate and regular rhythm.      Pulses: Normal pulses.      Heart sounds: Normal heart sounds.   Pulmonary:      Effort: " Pulmonary effort is normal.      Breath sounds: Normal breath sounds.   Abdominal:      General: Bowel sounds are normal.      Palpations: Abdomen is soft.      Tenderness: There is no abdominal tenderness.   Musculoskeletal:      Cervical back: Normal range of motion and neck supple.   Feet:      Left foot:      Skin integrity: Erythema present.      Toenail Condition: Left toenails are ingrown.   Neurological:      Mental Status: She is alert and oriented to person, place, and time.   Psychiatric:         Mood and Affect: Mood normal.           Result Review :     The following data was reviewed by: AYAD Mobley on 12/15/2021:          HPV DNA,High Risk - , (03/09/2021 09:20)  Liquid Based Pap Smear-Reference Lab (03/09/2021 09:20)  Hepatitis C Antibody (11/02/2021 10:49)  TSH (11/02/2021 10:49)  Hemoglobin A1c (11/02/2021 10:49)  Comprehensive Metabolic Panel (11/02/2021 10:49)                  Assessment and Plan      Diagnoses and all orders for this visit:    1. Annual physical exam (Primary)  Comments:  Counseled health maintenance recommendations    2. Ingrown nail of great toe of left foot  -     Ambulatory Referral to Podiatry    3. Skin infection  -     cephalexin (Keflex) 500 MG capsule; Take 1 capsule by mouth 2 (Two) Times a Day.  Dispense: 10 capsule; Refill: 0    4. Type 2 diabetes mellitus without complication, without long-term current use of insulin (HCC)  Comments:  Medical condition is stable.  Continue same therapy.  Will recheck at next regular appointment.  Orders:  -     SITagliptin (Januvia) 100 MG tablet; Take 1 tablet by mouth Daily.  Dispense: 90 tablet; Refill: 1    5. Encounter for screening mammogram for malignant neoplasm of breast  -     Mammo Screening Digital Tomosynthesis Bilateral With CAD; Future    6. Postmenopausal  -     DEXA Bone Density Axial; Future    7. Acquired hypothyroidism  Comments:  Medical condition is stable.  Continue same therapy.  Will recheck at next  regular appointment.    8. Obstructive sleep apnea (adult) (pediatric)  Comments:  Compliant with CPAP    9. Dysthymic disorder  Comments:  Medical condition is stable.  Continue same therapy.  Will recheck at next regular appointment.              Follow Up     Return in about 6 months (around 6/15/2022) for Recheck.

## 2021-12-29 NOTE — PROGRESS NOTES
Viky Swan  1965     Office/Outpatient Visit    Visit Date:  08:43 am    Provider: Clementina Barrientos N.P. (Assistant: Emperatriz Cheema MA)    Location: [facility]        Electronically signed by Clementina Barrientos N.P. on  2019 06:27:13 PM                             Subjective:        CC: Ms. Swan is a 54 year old White female.  Patient presents today for follow up visit, lab work;         HPI:           AKIN: Pt states doing well on cpap. She is sleeping well. States not getting up at night to urinate anymore.  She has a f/u appt coming up soon.    ROS:     CONSTITUTIONAL:  Negative for chills, fatigue, fever, and weight change.      EYES:  Negative for blurred vision.      CARDIOVASCULAR:  Negative for chest pain, orthopnea, paroxysmal nocturnal dyspnea and pedal edema.      RESPIRATORY:  Negative for dyspnea.      GASTROINTESTINAL:  Negative for abdominal pain, constipation, diarrhea, nausea and vomiting.      NEUROLOGICAL:  Negative for dizziness, headaches, paresthesias, and weakness.      PSYCHIATRIC:  Negative for anxiety, depression, and sleep disturbances.          Past Medical History / Family History / Social History:         Last Reviewed on 2019 09:00 AM by Clementina Barrientos    Past Medical History:                 PAST MEDICAL HISTORY             GYNECOLOGICAL HISTORY:        Menopause at age 45.          PREVENTIVE HEALTH MAINTENANCE             COLORECTAL CANCER SCREENING: Up to date (colonoscopy q10y; sigmoidoscopy q5y; Cologuard q3y) was last done 18; colonoscopy with normal results     MAMMOGRAM: Done within last 2 years and results in are chart was last done 2017 with normal results         Surgical History:         Tubal Ligation         Family History:         Positive for Hypertension ( sister ).      Positive for Lung Cancer ( father ).          Social History:     Occupation: Homemaker     Marital Status:       Children: 2 children         Tobacco/Alcohol/Supplements:     Last Reviewed on 11/19/2019 09:00 AM by Clementina Barrientos    Tobacco: She has never smoked.      Caffeine:  She admits to consuming caffeine via coffee ( 2 servings per day ) and soda ( 3 servings per day ).          Substance Abuse History:     Last Reviewed on 11/19/2019 09:00 AM by Clementina Barrientos    NEGATIVE         Mental Health History:     Last Reviewed on 11/19/2019 09:00 AM by Clementina Barrientos        Communicable Diseases (eg STDs):     Last Reviewed on 11/19/2019 09:00 AM by Clementina Barrientos        Current Problems:     Last Reviewed on 11/19/2019 09:00 AM by Clementina Barrientos    Essential (primary) hypertension    Mixed hyperlipidemia    Dysthymic disorder    Hypothyroidism, unspecified    Other specified diabetes mellitus without complications    Obstructive sleep apnea (adult) (pediatric)    Vitamin D deficiency, unspecified    Obesity, unspecified    Vitamin B deficiency, unspecified    Hypercalcemia    Acute bronchitis, unspecified    Other fatigue    Encounter for screening for other disorder        Immunizations:     Havrix -adult dose (HepA) 5/18/2018    Fluzone (3 + years dose) 10/8/2017    Fluzone Quadrivalent (3+ years) 10/1/2018    Influenza Virus Vaccine, unspecified formulation 10/1/2016    Adacel (Tdap) 7/19/2019    influenza, injectable, quadrivalent, preservative free 11/19/2019        Allergies:     Last Reviewed on 11/19/2019 09:00 AM by Clementina Barrientos    Novocain:          Current Medications:     Last Reviewed on 11/19/2019 09:00 AM by Clementina Barrientos    Crestor 10mg Tablet [1 tab daily]    metFORMIN 1,000 mg oral Tablet, Extended Release 24 hr [TAKE 1 TABLET TWICE A DAY]    levothyroxine 50 mcg oral tablet [1 tab daily]    aspirin 81 mg oral tablet, delayed release (enteric coated) [Take 1 tablet(s) by mouth daily]    Lancet   Lancet [Check blood one to two times daily as directed for ONE TOUCH ULTRA]     losartan-hydrochlorothiazide 50-12.5 mg oral tablet [Take 1 tablet(s) by mouth daily]    cholecalciferol (vitamin D3) 5,000 unit oral capsule [2 capsules QD]    multivitamin oral capsule [Take 1 capsule(s) by mouth daily]    Wellbutrin SR 150mg Tablets, Sustained Release [Take 1 tablet(s) by mouth bid]    Fish Oil 360-1,200 mg oral capsule [2 tabs daily]    Januvia 100mg Tablet [Take 1 tablet(s) by mouth daily]        Objective:        Vitals:         Current: 11/19/2019 8:48:17 AM    Ht:  4 ft, 11 in;  Wt: 219.8 lbs;  BMI: 44.4T: 97.6 F (oral);  BP: 131/71 mm Hg (right arm, sitting);  P: 81 bpm (right arm (BP Cuff), sitting);  sCr: 0.57 mg/dL;  GFR: 125.43        Exams:     PHYSICAL EXAM:     GENERAL: vital signs recorded - well developed, well nourished;  no apparent distress;     EYES: extraocular movements intact; conjunctiva and cornea are normal; PERRLA;     E/N/T:  normal EACs, TMs, nasal/oral mucosa, teeth, gingiva, and oropharynx;     NECK: range of motion is normal; thyroid is non-palpable;     RESPIRATORY: normal respiratory rate and pattern with no distress; normal breath sounds with no rales, rhonchi, wheezes or rubs;     CARDIOVASCULAR: normal rate; rhythm is regular;  no systolic murmur; no edema;     GASTROINTESTINAL: nontender; normal bowel sounds; no organomegaly;     NEUROLOGICAL:  cranial nerves, motor and sensory function, reflexes, gait and coordination are all intact;     PSYCHIATRIC:  appropriate affect and demeanor; normal speech pattern; grossly normal memory;         Assessment:         I10   Essential (primary) hypertension       F34.1   Dysthymic disorder       G47.33   Obstructive sleep apnea (adult) (pediatric)       E13.9   Other specified diabetes mellitus without complications           ORDERS:         Lab Orders:       [id number]  A1CEG - [facility] Hemoglobin A1C  (Send-Out)            [id number]  JOSE [facility] Microablbumin, quantitative  (Send-Out)            [id number]  PHYSF - [facility] PHYSICAL: CMP, CBC,  TSH, LIPID: [id number], [id number]  (Send-Out)                      Plan: Pt will call for eye exam.        Other specified diabetes mellitus without complications    LABORATORY:  Labs ordered to be performed today include HgbA1C, Microalbumin, and PHYSICAL PANEL; CMP, CBC, TSH, LIPID.            Orders:       [id number]  A1CEG - [facility] Hemoglobin A1C  (Send-Out)            [id number]  JOSE [facility] Microablbumin, quantitative  (Send-Out)            31500  PHYSF - [facility] PHYSICAL: CMP, CBC, TSH, LIPID: [id number], [id number], [id number], [id number]  (Send-Out)                  Charge Capture:         Primary Diagnosis:     I10  Essential (primary) hypertension     F34.1  Dysthymic disorder     G47.33  Obstructive sleep apnea (adult) (pediatric)     E13.9  Other specified diabetes mellitus without complications         ADDENDUMS:      ____________________________________    Addendum: 12/02/2019 09:02 SARANYA - Clementina Barrientos        Add code [id number]            Initial (On Arrival)

## 2022-02-03 ENCOUNTER — TELEPHONE (OUTPATIENT)
Dept: FAMILY MEDICINE CLINIC | Age: 57
End: 2022-02-03

## 2022-03-10 ENCOUNTER — TELEPHONE (OUTPATIENT)
Dept: FAMILY MEDICINE CLINIC | Age: 57
End: 2022-03-10

## 2022-03-10 DIAGNOSIS — R92.8 ABNORMALITY OF RIGHT BREAST ON SCREENING MAMMOGRAM: Primary | ICD-10-CM

## 2022-03-10 NOTE — TELEPHONE ENCOUNTER
Caller: Viky Swan    Relationship: Self    Best call back number: [phone]    What orders are you requesting (i.e. lab or imaging): IMAGING FOR A MAMMOGRAM FOR A 6 MONTH FOLLOW UP FOR BREAST EXAM                In what timeframe would the patient need to come in: ASAP   IT IS OVER DUE SHE WAS SUPPOSED TO HAVE IT BY JANUARY 29, 2022    Where will you receive your lab/imaging services: [facility]     Additional notes: PATIENT HAD A PREVIOUS APPOINTMENT WITH [facility] AND CANCELLED IT. STATES THAT SHE PREFERS TO STAY WITH [facility] SINCE THAT IS SHE HAD HER PREVIOUS ONE DONE THERE.         PATIENT HAS BEEN SCHEDULED FOR AUGUST 03 OF 2022 WITH [facility] BUT SHE NEEDS IT SOONER. SINCE IT IS ALREADY PAST 01/29/2022 WHEN SHE WAS SUPPOSED TO HAVE IT DONE. PATIENT HAS A LETTER IF IT IS NEEDED.

## 2022-04-27 ENCOUNTER — OFFICE VISIT (OUTPATIENT)
Dept: FAMILY MEDICINE CLINIC | Age: 57
End: 2022-04-27

## 2022-04-27 VITALS
SYSTOLIC BLOOD PRESSURE: 139 MMHG | WEIGHT: 217 LBS | DIASTOLIC BLOOD PRESSURE: 67 MMHG | TEMPERATURE: 97.7 F | BODY MASS INDEX: 43.75 KG/M2 | HEIGHT: 59 IN | HEART RATE: 89 BPM

## 2022-04-27 DIAGNOSIS — R92.8 ABNORMAL MAMMOGRAM: ICD-10-CM

## 2022-04-27 DIAGNOSIS — M54.32 BILATERAL SCIATICA: ICD-10-CM

## 2022-04-27 DIAGNOSIS — M54.31 BILATERAL SCIATICA: ICD-10-CM

## 2022-04-27 DIAGNOSIS — E11.9 TYPE 2 DIABETES MELLITUS WITHOUT COMPLICATION, WITHOUT LONG-TERM CURRENT USE OF INSULIN: Primary | ICD-10-CM

## 2022-04-27 DIAGNOSIS — E03.9 ACQUIRED HYPOTHYROIDISM: ICD-10-CM

## 2022-04-27 PROCEDURE — 99214 OFFICE O/P EST MOD 30 MIN: CPT | Performed by: NURSE PRACTITIONER

## 2022-04-27 PROCEDURE — 96372 THER/PROPH/DIAG INJ SC/IM: CPT | Performed by: NURSE PRACTITIONER

## 2022-04-27 RX ORDER — KETOROLAC TROMETHAMINE 30 MG/ML
30 INJECTION, SOLUTION INTRAMUSCULAR; INTRAVENOUS ONCE
Status: COMPLETED | OUTPATIENT
Start: 2022-04-27 | End: 2022-04-27

## 2022-04-27 RX ADMIN — KETOROLAC TROMETHAMINE 30 MG: 30 INJECTION, SOLUTION INTRAMUSCULAR; INTRAVENOUS at 13:47

## 2022-04-27 NOTE — PROGRESS NOTES
Chief Complaint  Viky Swan presents to [facility] for Hypertension (6 month follow up )    Subjective          History of Present Illness    Viky is following up on diabetes today. Currently taking Metformin and Januvia. She is on ARB, statin, and baby Aspirin as well. Eye exam UTD. Last A1C was 7.6 on 11/2/2021.   On Levothyroxine for hypothyroidism. Last TSH normal.   On Losartan/HCTZ for HTN.    On Crestor and fish oil for mixed hyperlipidemia. Tolerating well.   She is compliant with CPAP machine for AKIN and has noticed improvement in her sleep since starting. She is followed by sleep specialist.    On Wellbutrin for depression with anxiety. Doing well. Has been taking Wellbutrin for over 5 years. Denies suicidal ideation.   Osteopenia noted on bone density scan. She is taking OTC Vitamin D and calcium supplement.   Had abnormal screening mammogram on 7/21/2021. Diagnostic mammogram and US performed on 8/2/2021. Short term follow up diagnostic mammogram right breast in 6 months advised. She had to cancel that and bone density scan so not performed yet.   She has seen podiatry since her last visit. Left great toe improved.   Her sciatica has been flared up recently as well.    Review of Systems      Allergies   Allergen Reactions   • Novocain [Procaine] Unknown - Low Severity      Past Medical History:   Diagnosis Date   • Dysthymic disorder    • Essential (primary) hypertension    • Hypercalcemia    • Hypothyroidism, unspecified    • Mixed hyperlipidemia    • Obesity, unspecified    • Other specified menopausal and perimenopausal disorders    • Vitamin B deficiency, unspecified    • Vitamin D deficiency, unspecified      Current Outpatient Medications   Medication Sig Dispense Refill   • aspirin 81 MG EC tablet Take 81 mg by mouth Daily.     • buPROPion SR (WELLBUTRIN SR) 150 MG 12 hr tablet Take 1 tablet by mouth 2 (Two) Times a Day. 180 tablet 1   • Calcium Carbonate  1500 (600 Ca) MG tablet Take 1 tablet by mouth Daily. 90 tablet 1   • cephalexin (Keflex) 500 MG capsule Take 1 capsule by mouth 2 (Two) Times a Day. 10 capsule 0   • hydroCHLOROthiazide (HYDRODIURIL) 12.5 MG tablet Take 1 tablet by mouth Daily. 90 tablet 1   • levothyroxine (SYNTHROID, LEVOTHROID) 50 MCG tablet Take 1 tablet by mouth Daily. 90 tablet 1   • losartan (COZAAR) 50 MG tablet Take 1 tablet by mouth Daily. 90 tablet 1   • metFORMIN ER (GLUCOPHAGE-XR) 500 MG 24 hr tablet Take 2 tablets by mouth 2 (Two) Times a Day. 360 tablet 1   • multivitamin with minerals tablet tablet Take 1 tablet by mouth Daily.     • Omega-3 Fatty Acids (fish oil) 1000 MG capsule capsule Take  by mouth Daily With Breakfast.     • rosuvastatin (CRESTOR) 10 MG tablet Take 1 tablet by mouth Daily. 90 tablet 1   • SITagliptin (Januvia) 100 MG tablet Take 1 tablet by mouth Daily. 90 tablet 1   • triazolam (HALCION) 0.25 MG tablet At Night As Needed.     • vitamin D3 125 MCG (5000 UT) capsule capsule Take 2 capsules by mouth Daily.     • diclofenac (VOLTAREN) 50 MG EC tablet Take 1 tablet by mouth 2 (Two) Times a Day As Needed (pain). 60 tablet 0     No current facility-administered medications for this visit.     Past Surgical History:   Procedure Laterality Date   • TUBAL ABDOMINAL LIGATION        Social History     Tobacco Use   • Smoking status: Never Smoker   • Smokeless tobacco: Never Used   Vaping Use   • Vaping Use: Never used   Substance Use Topics   • Alcohol use: Not Currently   • Drug use: Never     Family History   Problem Relation Age of Onset   • Lung cancer Father    • Hypertension Sister      Health Maintenance Due   Topic Date Due   • LIPID PANEL  03/30/2022   • URINE MICROALBUMIN  03/30/2022      Immunization History   Administered Date(s) Administered   • COVID-19 (MODERNA) 1st, 2nd, 3rd Dose Only 01/27/2021, 02/24/2021, 12/22/2021   • FluLaval/Fluarix/Fluzone >6 10/19/2021   • Hepatitis A 05/07/2018, 05/18/2018,  "11/30/2018, 01/01/2019   • Influenza, Unspecified 10/21/2020   • Pneumococcal Polysaccharide (PPSV23) 10/29/2021   • Td 10/30/2003   • Tdap 07/19/2019        Objective     Vitals:    04/27/22 1258   BP: 139/67   BP Location: Left arm   Patient Position: Sitting   Pulse: 89   Temp: 97.7 °F (36.5 °C)   TempSrc: Oral   Weight: 98.4 kg (217 lb)   Height: 149.9 cm (59\")     Body mass index is 43.83 kg/m².     Physical Exam  Vitals reviewed.   Constitutional:       General: She is not in acute distress.     Appearance: Normal appearance. She is well-developed.   HENT:      Head: Normocephalic and atraumatic.   Cardiovascular:      Rate and Rhythm: Normal rate and regular rhythm.   Pulmonary:      Effort: Pulmonary effort is normal.      Breath sounds: Normal breath sounds.   Feet:      Left foot:      Toenail Condition: Left toenails are normal.   Neurological:      Mental Status: She is alert and oriented to person, place, and time.   Psychiatric:         Mood and Affect: Mood and affect normal.           Result Review :     The following data was reviewed by: AYAD Mobley on 04/27/2022:          Hepatitis C Antibody (11/02/2021 10:49)  TSH (11/02/2021 10:49)  Hemoglobin A1c (11/02/2021 10:49)  Comprehensive Metabolic Panel (11/02/2021 10:49)                  Assessment and Plan      Diagnoses and all orders for this visit:    1. Type 2 diabetes mellitus without complication, without long-term current use of insulin (HCC) (Primary)  Assessment & Plan:    Medical condition is stable.  Continue same therapy.  Will recheck at next regular appointment      Orders:  -     Comprehensive metabolic panel; Future  -     Hemoglobin A1c; Future  -     Lipid panel; Future  -     Microalbumin / Creatinine Urine Ratio - Urine, Clean Catch; Future    2. Acquired hypothyroidism  Assessment & Plan:  Medical condition is stable.  Continue same therapy.  Will recheck at next regular appointment      Orders:  -     TSH Rfx On " Abnormal To Free T4; Future    3. Bilateral sciatica  Comments:  Exercises, stretches.  Orders:  -     diclofenac (VOLTAREN) 50 MG EC tablet; Take 1 tablet by mouth 2 (Two) Times a Day As Needed (pain).  Dispense: 60 tablet; Refill: 0  -     ketorolac (TORADOL) injection 30 mg    4. Abnormal mammogram  -     Mammo Diagnostic Digital Tomosynthesis Bilateral With CAD; Future  -     US Breast Right Limited; Future            Follow Up     Return in about 6 months (around 10/27/2022) for Recheck.

## 2022-04-27 NOTE — ASSESSMENT & PLAN NOTE
Medical condition is stable.  Continue same therapy.  Will recheck at next regular appointment

## 2022-04-28 DIAGNOSIS — E78.2 MIXED HYPERLIPIDEMIA: ICD-10-CM

## 2022-04-28 RX ORDER — ROSUVASTATIN CALCIUM 10 MG/1
TABLET, COATED ORAL
Qty: 90 TABLET | Refills: 3 | Status: SHIPPED | OUTPATIENT
Start: 2022-04-28 | End: 2023-01-25 | Stop reason: SDUPTHER

## 2022-05-04 DIAGNOSIS — F34.1 DYSTHYMIC DISORDER: ICD-10-CM

## 2022-05-04 DIAGNOSIS — E03.9 ACQUIRED HYPOTHYROIDISM: ICD-10-CM

## 2022-05-04 DIAGNOSIS — E11.9 TYPE 2 DIABETES MELLITUS WITHOUT COMPLICATION, WITHOUT LONG-TERM CURRENT USE OF INSULIN: ICD-10-CM

## 2022-05-04 RX ORDER — LEVOTHYROXINE SODIUM 0.05 MG/1
TABLET ORAL
Qty: 90 TABLET | Refills: 1 | Status: SHIPPED | OUTPATIENT
Start: 2022-05-04 | End: 2022-11-04

## 2022-05-04 RX ORDER — BUPROPION HYDROCHLORIDE 150 MG/1
TABLET, EXTENDED RELEASE ORAL
Qty: 180 TABLET | Refills: 1 | Status: SHIPPED | OUTPATIENT
Start: 2022-05-04 | End: 2022-11-04

## 2022-05-04 RX ORDER — METFORMIN HYDROCHLORIDE 500 MG/1
TABLET, EXTENDED RELEASE ORAL
Qty: 360 TABLET | Refills: 1 | Status: SHIPPED | OUTPATIENT
Start: 2022-05-04 | End: 2022-11-04

## 2022-05-05 DIAGNOSIS — M85.89 OTHER SPECIFIED DISORDERS OF BONE DENSITY AND STRUCTURE, MULTIPLE SITES: ICD-10-CM

## 2022-05-10 ENCOUNTER — TELEPHONE (OUTPATIENT)
Dept: FAMILY MEDICINE CLINIC | Age: 57
End: 2022-05-10

## 2022-05-10 ENCOUNTER — APPOINTMENT (OUTPATIENT)
Dept: ULTRASOUND IMAGING | Facility: HOSPITAL | Age: 57
End: 2022-05-10

## 2022-05-10 ENCOUNTER — APPOINTMENT (OUTPATIENT)
Dept: MAMMOGRAPHY | Facility: HOSPITAL | Age: 57
End: 2022-05-10

## 2022-05-17 ENCOUNTER — HOSPITAL ENCOUNTER (OUTPATIENT)
Dept: MAMMOGRAPHY | Facility: HOSPITAL | Age: 57
Discharge: HOME | End: 2022-05-17

## 2022-05-17 ENCOUNTER — HOSPITAL ENCOUNTER (OUTPATIENT)
Dept: ULTRASOUND IMAGING | Facility: HOSPITAL | Age: 57
Discharge: HOME | End: 2022-05-17

## 2022-05-17 DIAGNOSIS — R92.8 ABNORMAL MAMMOGRAM: ICD-10-CM

## 2022-05-17 PROCEDURE — G0279 TOMOSYNTHESIS, MAMMO: HCPCS

## 2022-05-17 PROCEDURE — 77066 DX MAMMO INCL CAD BI: CPT

## 2022-05-18 DIAGNOSIS — Z12.31 ENCOUNTER FOR SCREENING MAMMOGRAM FOR MALIGNANT NEOPLASM OF BREAST: Primary | ICD-10-CM

## 2022-05-25 DIAGNOSIS — M54.31 BILATERAL SCIATICA: ICD-10-CM

## 2022-05-25 DIAGNOSIS — M54.32 BILATERAL SCIATICA: ICD-10-CM

## 2022-06-28 DIAGNOSIS — I10 ESSENTIAL (PRIMARY) HYPERTENSION: ICD-10-CM

## 2022-06-28 RX ORDER — LOSARTAN POTASSIUM 50 MG/1
TABLET ORAL
Qty: 90 TABLET | Refills: 0 | Status: SHIPPED | OUTPATIENT
Start: 2022-06-28 | End: 2022-09-26

## 2022-06-28 RX ORDER — HYDROCHLOROTHIAZIDE 12.5 MG/1
TABLET ORAL
Qty: 90 TABLET | Refills: 0 | Status: SHIPPED | OUTPATIENT
Start: 2022-06-28 | End: 2022-09-26

## 2022-08-03 ENCOUNTER — HOSPITAL ENCOUNTER (OUTPATIENT)
Dept: MAMMOGRAPHY | Facility: HOSPITAL | Age: 57
Discharge: HOME | End: 2022-08-03
Admitting: NURSE PRACTITIONER

## 2022-08-03 DIAGNOSIS — Z12.31 ENCOUNTER FOR SCREENING MAMMOGRAM FOR MALIGNANT NEOPLASM OF BREAST: ICD-10-CM

## 2022-08-03 PROCEDURE — 77063 BREAST TOMOSYNTHESIS BI: CPT

## 2022-08-03 PROCEDURE — 77067 SCR MAMMO BI INCL CAD: CPT

## 2022-08-22 ENCOUNTER — TELEPHONE (OUTPATIENT)
Dept: ORTHOPEDIC SURGERY | Facility: CLINIC | Age: 57
End: 2022-08-22

## 2022-08-22 NOTE — TELEPHONE ENCOUNTER
Caller: PATIENT     Relationship to patient: SELF     Best call back number:[phone]    Chief complaint: RIGHT KNEE PAIN    Type of visit: CORTISONE INJECTIONS     Requested date: ASAP        Additional notes: PT. ASKING TO SCHEDULE CORTISONE INJECTION FOR RIGHT KNEE.

## 2022-08-25 ENCOUNTER — OFFICE VISIT (OUTPATIENT)
Dept: FAMILY MEDICINE CLINIC | Age: 57
End: 2022-08-25

## 2022-08-25 ENCOUNTER — HOSPITAL ENCOUNTER (OUTPATIENT)
Dept: GENERAL RADIOLOGY | Facility: HOSPITAL | Age: 57
Discharge: HOME | End: 2022-08-25
Admitting: NURSE PRACTITIONER

## 2022-08-25 VITALS
HEIGHT: 59 IN | SYSTOLIC BLOOD PRESSURE: 132 MMHG | HEART RATE: 70 BPM | BODY MASS INDEX: 43.2 KG/M2 | WEIGHT: 214.3 LBS | DIASTOLIC BLOOD PRESSURE: 54 MMHG

## 2022-08-25 DIAGNOSIS — M54.42 CHRONIC MIDLINE LOW BACK PAIN WITH BILATERAL SCIATICA: Primary | ICD-10-CM

## 2022-08-25 DIAGNOSIS — M54.31 BILATERAL SCIATICA: ICD-10-CM

## 2022-08-25 DIAGNOSIS — M54.32 BILATERAL SCIATICA: ICD-10-CM

## 2022-08-25 DIAGNOSIS — M54.41 CHRONIC MIDLINE LOW BACK PAIN WITH BILATERAL SCIATICA: Primary | ICD-10-CM

## 2022-08-25 DIAGNOSIS — M54.42 CHRONIC MIDLINE LOW BACK PAIN WITH BILATERAL SCIATICA: ICD-10-CM

## 2022-08-25 DIAGNOSIS — G89.29 CHRONIC MIDLINE LOW BACK PAIN WITH BILATERAL SCIATICA: ICD-10-CM

## 2022-08-25 DIAGNOSIS — M54.41 CHRONIC MIDLINE LOW BACK PAIN WITH BILATERAL SCIATICA: ICD-10-CM

## 2022-08-25 DIAGNOSIS — G89.29 CHRONIC MIDLINE LOW BACK PAIN WITH BILATERAL SCIATICA: Primary | ICD-10-CM

## 2022-08-25 PROCEDURE — 72100 X-RAY EXAM L-S SPINE 2/3 VWS: CPT

## 2022-08-25 PROCEDURE — 99213 OFFICE O/P EST LOW 20 MIN: CPT | Performed by: NURSE PRACTITIONER

## 2022-08-25 PROCEDURE — 96372 THER/PROPH/DIAG INJ SC/IM: CPT | Performed by: NURSE PRACTITIONER

## 2022-08-25 RX ORDER — KETOROLAC TROMETHAMINE 30 MG/ML
30 INJECTION, SOLUTION INTRAMUSCULAR; INTRAVENOUS ONCE
Status: COMPLETED | OUTPATIENT
Start: 2022-08-25 | End: 2022-08-25

## 2022-08-25 RX ADMIN — KETOROLAC TROMETHAMINE 30 MG: 30 INJECTION, SOLUTION INTRAMUSCULAR; INTRAVENOUS at 15:32

## 2022-08-25 NOTE — PROGRESS NOTES
"Viky Swan presents to [facility] with complaint of  Leg Pain (Pt c/o (L) leg pain.//)    SUBJECTIVE  Leg Pain   There was no injury mechanism. The pain is present in the left leg (low back). The quality of the pain is described as aching. The pain is severe. The pain has been intermittent since onset. Associated symptoms include muscle weakness ( \"left leg gives out\"). Pertinent negatives include no inability to bear weight, loss of motion, loss of sensation, numbness or tingling. Associated symptoms comments: No incontinence . The symptoms are aggravated by movement and weight bearing. She has tried non-weight bearing, rest and acetaminophen for the symptoms. The treatment provided no relief.   She has been dealing with sciatica for the past 4-6 months. She was here back in April and had a Toradol injection for her sciatica. She says this helper her pain and she would like to have this again today.     OBJECTIVE  Vital Signs:   /54 (BP Location: Right arm, Patient Position: Sitting)   Pulse 70   Ht 149.9 cm (59\")   Wt 97.2 kg (214 lb 4.8 oz)   BMI 43.28 kg/m²       Physical Exam  Vitals reviewed.   Constitutional:       General: She is not in acute distress.     Appearance: Normal appearance. She is not ill-appearing.   HENT:      Head: Normocephalic and atraumatic.      Nose: Nose normal.      Mouth/Throat:      Mouth: Mucous membranes are moist.      Pharynx: Oropharynx is clear.   Cardiovascular:      Rate and Rhythm: Normal rate and regular rhythm.      Pulses: Normal pulses.      Heart sounds: Normal heart sounds.   Pulmonary:      Effort: Pulmonary effort is normal.      Breath sounds: Normal breath sounds.   Musculoskeletal:      Cervical back: Normal and neck supple.      Thoracic back: Normal.      Lumbar back: Positive left straight leg raise test. Negative right straight leg raise test.   Skin:     General: Skin is warm and dry.      Capillary Refill: " Capillary refill takes less than 2 seconds.   Neurological:      General: No focal deficit present.      Mental Status: She is alert and oriented to person, place, and time. Mental status is at baseline.   Psychiatric:         Mood and Affect: Mood normal.         Behavior: Behavior normal.         Judgment: Judgment normal.            ASSESSMENT AND PLAN:  Diagnoses and all orders for this visit:    1. Chronic midline low back pain with bilateral sciatica (Primary)  -     XR Spine Lumbar 2 or 3 View; Future  -     ketorolac (TORADOL) injection 30 mg    2. Bilateral sciatica  -     XR Spine Lumbar 2 or 3 View; Future  -     ketorolac (TORADOL) injection 30 mg    -patient was given toradol inj in office today  -may take tylenol with voltaren   -educated stretching exercises to target sciatica   -xray in office today as she may need MRI or PT in future, she will discuss this at her follow up with PCP      Follow Up   Return if symptoms worsen or fail to improve, for Next scheduled follow up. Patient to notify office with any acute concerns or issues.  Patient verbalizes understanding, agrees with plan of care and has no further questions upon discharge.     Patient was given instructions and counseling regarding her condition or for health maintenance advice. Please see specific information pulled into the AVS if appropriate.     Discussed the importance of following up with any needed screening tests/labs/specialist appointments and any requested follow-up recommended by me today. Importance of maintaining follow-up discussed and patient accepts that missed appointments can delay diagnosis and potentially lead to worsening of conditions.

## 2022-08-26 DIAGNOSIS — M54.32 BILATERAL SCIATICA: ICD-10-CM

## 2022-08-26 DIAGNOSIS — M54.31 BILATERAL SCIATICA: ICD-10-CM

## 2022-09-07 ENCOUNTER — OFFICE VISIT (OUTPATIENT)
Dept: FAMILY MEDICINE CLINIC | Age: 57
End: 2022-09-07

## 2022-09-07 VITALS
DIASTOLIC BLOOD PRESSURE: 63 MMHG | HEART RATE: 88 BPM | WEIGHT: 211 LBS | TEMPERATURE: 98.2 F | BODY MASS INDEX: 42.54 KG/M2 | HEIGHT: 59 IN | SYSTOLIC BLOOD PRESSURE: 138 MMHG

## 2022-09-07 DIAGNOSIS — E11.9 TYPE 2 DIABETES MELLITUS WITHOUT COMPLICATION, WITHOUT LONG-TERM CURRENT USE OF INSULIN: ICD-10-CM

## 2022-09-07 DIAGNOSIS — M54.41 CHRONIC MIDLINE LOW BACK PAIN WITH BILATERAL SCIATICA: Primary | ICD-10-CM

## 2022-09-07 DIAGNOSIS — M54.42 CHRONIC MIDLINE LOW BACK PAIN WITH BILATERAL SCIATICA: Primary | ICD-10-CM

## 2022-09-07 DIAGNOSIS — G89.29 CHRONIC MIDLINE LOW BACK PAIN WITH BILATERAL SCIATICA: Primary | ICD-10-CM

## 2022-09-07 PROCEDURE — 99214 OFFICE O/P EST MOD 30 MIN: CPT | Performed by: NURSE PRACTITIONER

## 2022-09-07 RX ORDER — PREDNISONE 10 MG/1
TABLET ORAL
Qty: 35 TABLET | Refills: 0 | Status: SHIPPED | OUTPATIENT
Start: 2022-09-07 | End: 2022-09-22

## 2022-09-07 RX ORDER — BLOOD-GLUCOSE METER
1 KIT MISCELLANEOUS DAILY
Qty: 1 EACH | Refills: 0 | Status: SHIPPED | OUTPATIENT
Start: 2022-09-07 | End: 2022-09-07

## 2022-09-07 RX ORDER — BLOOD-GLUCOSE METER
1 KIT MISCELLANEOUS DAILY
Qty: 1 EACH | Refills: 0 | Status: SHIPPED | OUTPATIENT
Start: 2022-09-07 | End: 2023-09-07

## 2022-09-07 RX ORDER — LANCETS 28 GAUGE
EACH MISCELLANEOUS
Qty: 100 EACH | Refills: 0 | Status: SHIPPED | OUTPATIENT
Start: 2022-09-07 | End: 2022-10-05

## 2022-09-07 RX ORDER — LANCETS 28 GAUGE
EACH MISCELLANEOUS
Qty: 100 EACH | Refills: 0 | Status: SHIPPED | OUTPATIENT
Start: 2022-09-07 | End: 2022-09-07

## 2022-09-07 NOTE — PROGRESS NOTES
Chief Complaint  Viky Swan presents to [facility] for Leg Pain (Leg pain, sciatic nerve, not getting any better, X few months )    Subjective          History of Present Illness    Viky is here today with c/o low back pain radiating down left leg. Was seen by Shy LION on 8/25/22. Given toradol injection in office. She had lumbar spine xray that showed mild degenerative disc disease. Also taking diclofenac. She notes that her left leg has been giving out on her. She stands at work and notes there has not been much improvement since her last visit.   She is also following up on her diabetes.Current medication includes glucophage XR and januvia. Last A1C 8.4. She had previously declined any change in medication. Would like to have lab rechecked and reconsider medication if no improvement in labs.     Review of Systems       Allergies   Allergen Reactions   • Novocain [Procaine] Unknown - Low Severity      Past Medical History:   Diagnosis Date   • Dysthymic disorder    • Essential (primary) hypertension    • Hypercalcemia    • Hypothyroidism, unspecified    • Mixed hyperlipidemia    • Obesity, unspecified    • Other specified menopausal and perimenopausal disorders    • Vitamin B deficiency, unspecified    • Vitamin D deficiency, unspecified      Current Outpatient Medications   Medication Sig Dispense Refill   • aspirin 81 MG EC tablet Take 81 mg by mouth Daily.     • buPROPion SR (WELLBUTRIN SR) 150 MG 12 hr tablet TAKE 1 TABLET TWICE A  tablet 1   • Calcium Carbonate 1500 (600 Ca) MG tablet TAKE 1 TABLET DAILY 90 tablet 3   • cephalexin (Keflex) 500 MG capsule Take 1 capsule by mouth 2 (Two) Times a Day. (Patient taking differently: Take 500 mg by mouth As Needed.) 10 capsule 0   • diclofenac (VOLTAREN) 50 MG EC tablet TAKE 1 TABLET BY MOUTH TWICE DAILY AS NEEDED FOR PAIN 180 tablet 0   • glucose blood test strip Use as instructed 100 each 0   • glucose  monitoring kit (FREESTYLE) monitoring kit 1 each Daily. 1 each 0   • hydroCHLOROthiazide (HYDRODIURIL) 12.5 MG tablet TAKE 1 TABLET DAILY 90 tablet 0   • Lancets (freestyle) lancets Use as instructed 100 each 0   • levothyroxine (SYNTHROID, LEVOTHROID) 50 MCG tablet TAKE 1 TABLET DAILY 90 tablet 1   • losartan (COZAAR) 50 MG tablet TAKE 1 TABLET DAILY 90 tablet 0   • metFORMIN ER (GLUCOPHAGE-XR) 500 MG 24 hr tablet TAKE 2 TABLETS TWICE A  tablet 1   • multivitamin with minerals tablet tablet Take 1 tablet by mouth Daily.     • Omega-3 Fatty Acids (fish oil) 1000 MG capsule capsule Take  by mouth Daily With Breakfast.     • rosuvastatin (CRESTOR) 10 MG tablet TAKE 1 TABLET DAILY 90 tablet 3   • SITagliptin (Januvia) 100 MG tablet Take 1 tablet by mouth Daily. 90 tablet 1   • triazolam (HALCION) 0.25 MG tablet At Night As Needed.     • vitamin D3 125 MCG (5000 UT) capsule capsule Take 1 capsule by mouth Daily.     • predniSONE (DELTASONE) 10 MG tablet Take 2 tablets by mouth 2 (Two) Times a Day for 5 days, THEN 1 tablet 2 (Two) Times a Day for 5 days, THEN 1 tablet Daily for 5 days. 35 tablet 0     No current facility-administered medications for this visit.     Past Surgical History:   Procedure Laterality Date   • TUBAL ABDOMINAL LIGATION        Social History     Tobacco Use   • Smoking status: Never Smoker   • Smokeless tobacco: Never Used   Vaping Use   • Vaping Use: Never used   Substance Use Topics   • Alcohol use: Not Currently   • Drug use: Never     Family History   Problem Relation Age of Onset   • Lung cancer Father    • Hypertension Sister      There are no preventive care reminders to display for this patient.   Immunization History   Administered Date(s) Administered   • COVID-19 (MODERNA) 1st, 2nd, 3rd Dose Only 01/27/2021, 02/24/2021, 12/22/2021   • FluLaval/Fluzone >6mos 10/19/2021   • Hepatitis A 05/07/2018, 05/18/2018, 11/30/2018, 01/01/2019   • Influenza, Unspecified 10/21/2020   •  "Pneumococcal Polysaccharide (PPSV23) 10/29/2021   • Td 10/30/2003   • Tdap 07/19/2019        Objective     Vitals:    09/07/22 1450   BP: 138/63   BP Location: Left arm   Patient Position: Sitting   Pulse: 88   Temp: 98.2 °F (36.8 °C)   TempSrc: Oral   Weight: 95.7 kg (211 lb)   Height: 149.9 cm (59\")     Body mass index is 42.62 kg/m².     Physical Exam  Vitals reviewed.   Constitutional:       General: She is not in acute distress.     Appearance: Normal appearance. She is well-developed.   HENT:      Head: Normocephalic and atraumatic.   Cardiovascular:      Rate and Rhythm: Normal rate and regular rhythm.   Pulmonary:      Effort: Pulmonary effort is normal.      Breath sounds: Normal breath sounds.   Neurological:      Mental Status: She is alert and oriented to person, place, and time.   Psychiatric:         Mood and Affect: Mood and affect normal.           Result Review :     The following data was reviewed by: AYAD Mobley on 09/07/2022:               XR Spine Lumbar 2 or 3 View (08/25/2022 16:02)             Assessment and Plan      Diagnoses and all orders for this visit:    1. Chronic midline low back pain with bilateral sciatica (Primary)  -     Ambulatory Referral to Physical Therapy Evaluate and treat  -     predniSONE (DELTASONE) 10 MG tablet; Take 2 tablets by mouth 2 (Two) Times a Day for 5 days, THEN 1 tablet 2 (Two) Times a Day for 5 days, THEN 1 tablet Daily for 5 days.  Dispense: 35 tablet; Refill: 0    2. Type 2 diabetes mellitus without complication, without long-term current use of insulin ([facility])  -     Discontinue: Lancets (freestyle) lancets; Use as instructed  Dispense: 100 each; Refill: 0  -     Discontinue: glucose monitoring kit (FREESTYLE) monitoring kit; 1 each Daily.  Dispense: 1 each; Refill: 0  -     Discontinue: glucose blood test strip; Use as instructed  Dispense: 100 each; Refill: 0  -     glucose blood test strip; Use as instructed  Dispense: 100 each; Refill: 0  -     " glucose monitoring kit (FREESTYLE) monitoring kit; 1 each Daily.  Dispense: 1 each; Refill: 0  -     Lancets (freestyle) lancets; Use as instructed  Dispense: 100 each; Refill: 0  -     Hemoglobin A1c; Future  -     Comprehensive metabolic panel; Future              Follow Up     Return for As needed for persistent or worsening symptoms.

## 2022-09-09 DIAGNOSIS — E11.9 TYPE 2 DIABETES MELLITUS WITHOUT COMPLICATION, WITHOUT LONG-TERM CURRENT USE OF INSULIN: Primary | ICD-10-CM

## 2022-09-09 LAB
ALBUMIN SERPL-MCNC: 5 G/DL (ref 3.8–4.9)
ALBUMIN/GLOB SERPL: 2.8 {RATIO} (ref 1.2–2.2)
ALP SERPL-CCNC: 87 IU/L (ref 44–121)
ALT SERPL-CCNC: 30 IU/L (ref 0–32)
AMBIG ABBREV CMP14 DEFAULT: NORMAL
AST SERPL-CCNC: 27 IU/L (ref 0–40)
BILIRUB SERPL-MCNC: 0.4 MG/DL (ref 0–1.2)
BUN SERPL-MCNC: 17 MG/DL (ref 6–24)
BUN/CREAT SERPL: 25 (ref 9–23)
CALCIUM SERPL-MCNC: 9.7 MG/DL (ref 8.7–10.2)
CHLORIDE SERPL-SCNC: 100 MMOL/L (ref 96–106)
CO2 SERPL-SCNC: 24 MMOL/L (ref 20–29)
CREAT SERPL-MCNC: 0.67 MG/DL (ref 0.57–1)
EGFRCR-CYS SERPLBLD CKD-EPI 2021: 103 ML/MIN/1.73
GLOBULIN SER CALC-MCNC: 1.8 G/DL (ref 1.5–4.5)
GLUCOSE SERPL-MCNC: 191 MG/DL (ref 65–99)
HBA1C MFR BLD: 8.2 % (ref 4.8–5.6)
POTASSIUM SERPL-SCNC: 4.4 MMOL/L (ref 3.5–5.2)
PROT SERPL-MCNC: 6.8 G/DL (ref 6–8.5)
SODIUM SERPL-SCNC: 142 MMOL/L (ref 134–144)

## 2022-09-09 RX ORDER — GLIMEPIRIDE 1 MG/1
1 TABLET ORAL
Qty: 90 TABLET | Refills: 1 | Status: SHIPPED | OUTPATIENT
Start: 2022-09-09 | End: 2023-01-25 | Stop reason: SDUPTHER

## 2022-09-15 ENCOUNTER — OUTSIDE FACILITY SERVICE (OUTPATIENT)
Dept: FAMILY MEDICINE CLINIC | Age: 57
End: 2022-09-15

## 2022-09-15 ENCOUNTER — CLINICAL SUPPORT (OUTPATIENT)
Dept: ORTHOPEDIC SURGERY | Facility: CLINIC | Age: 57
End: 2022-09-15

## 2022-09-15 VITALS — BODY MASS INDEX: 42.33 KG/M2 | WEIGHT: 210 LBS | HEIGHT: 59 IN | TEMPERATURE: 97.1 F

## 2022-09-15 DIAGNOSIS — M17.11 PRIMARY OSTEOARTHRITIS OF RIGHT KNEE: Primary | ICD-10-CM

## 2022-09-15 PROCEDURE — OUTSIDEPOS PR OUTSIDE POS PLACEHOLDER: Performed by: NURSE PRACTITIONER

## 2022-09-15 PROCEDURE — 20610 DRAIN/INJ JOINT/BURSA W/O US: CPT | Performed by: ORTHOPAEDIC SURGERY

## 2022-09-15 RX ADMIN — METHYLPREDNISOLONE ACETATE 160 MG: 80 INJECTION, SUSPENSION INTRA-ARTICULAR; INTRALESIONAL; INTRAMUSCULAR; SOFT TISSUE at 09:57

## 2022-09-15 NOTE — PROGRESS NOTES
"Chief Complaint  Follow-up and Pain of the Right Knee    Subjective    History of Present Illness      Viky Swan is a 57 y.o. female who presents to [facility] for Patient returns today for right knee pain.  Her pain is located over the medial and lateral aspect of the joint.  The pain has been progressive in nature and remains intermittent .  Her pain is worsened by going up and down stairs, rising after sitting. There has been improvement in the past with injections.       Objective   Vital Signs:   Temp 97.1 °F (36.2 °C)   Ht 149.9 cm (59\")   Wt 95.3 kg (210 lb)   BMI 42.41 kg/m²     Physical Exam  57 y.o. female is awake, alert, oriented, in no acute distress and well developed, well nourished.  Ortho Exam   RIGHT knee  Right knee (meniscus). The knee joint shows effusion with some thickening of the synovial membrane. There is tenderness over the meniscus. Apley's grinding test is positive over the joint line. Yadira's sign is positive with increased pain on torsional testing. There is a distinct click in mid flexion. Range of motion is from 0-110 degrees of flexion. No instability on medial or lateral testing. Anterior and posterior drawer testing is negative. Lachman test is negative. Joint line tenderness is present to direct palpation. There is some tenderness over the medial face of the tibia just distal to the joint line. The dorsalis pedis and posterior tibial artery pulses are palpable. Common peroneal nerve function is well preserved. Gait is cautious and somewhat antalgic. Full extension causes the patient to have quite a bit of pain and discomfort.         Result Review :                  Large Joint Arthrocentesis: R knee  Date/Time: 9/15/2022 9:57 AM  Consent given by: patient  Site marked: site marked  Timeout: Immediately prior to procedure a time out was called to verify the correct patient, procedure, equipment, support staff and site/side marked as " required   Supporting Documentation  Indications: pain   Procedure Details  Location: knee - R knee  Preparation: Patient was prepped and draped in the usual sterile fashion  Needle size: 25 G  Approach: anteromedial  Medications administered: 2 mL lidocaine (cardiac); 160 mg methylPREDNISolone acetate 80 MG/ML  Patient tolerance: patient tolerated the procedure well with no immediate complications               Assessment   Assessment and Plan    Problem List Items Addressed This Visit    None         Follow Up   · Injected patient's right knee joint(s)with Depo-Medrol from an anteromedial approach   · Compression/brace   · Rest, ice, compression, and elevation (RICE) therapy  · OTC Alternate Ibuprofen and Tylenol as needed  · Follow up in 3 month(s)  • Patient was given instructions and counseling regarding her condition or for health maintenance advice. Please see specific information pulled into the AVS if appropriate.     Ambrocio Rivera MD   Date of Encounter: 9/15/2022   Electronically signed by Ambrocio Rivera MD, 09/15/22, 9:57 AM EDT.     EMR Dragon/Transcription disclaimer:  Much of this encounter note is an electronic transcription/translation of spoken language to printed text. The electronic translation of spoken language may permit erroneous, or at times, nonsensical words or phrases to be inadvertently transcribed; Although I have reviewed the note for such errors, some may still exist.

## 2022-09-26 DIAGNOSIS — I10 ESSENTIAL (PRIMARY) HYPERTENSION: ICD-10-CM

## 2022-09-26 RX ORDER — LOSARTAN POTASSIUM 50 MG/1
TABLET ORAL
Qty: 90 TABLET | Refills: 1 | Status: SHIPPED | OUTPATIENT
Start: 2022-09-26 | End: 2023-01-25 | Stop reason: SDUPTHER

## 2022-09-26 RX ORDER — HYDROCHLOROTHIAZIDE 12.5 MG/1
TABLET ORAL
Qty: 90 TABLET | Refills: 1 | Status: SHIPPED | OUTPATIENT
Start: 2022-09-26 | End: 2023-01-25 | Stop reason: SDUPTHER

## 2022-09-26 RX ORDER — METHYLPREDNISOLONE ACETATE 80 MG/ML
160 INJECTION, SUSPENSION INTRA-ARTICULAR; INTRALESIONAL; INTRAMUSCULAR; SOFT TISSUE
Status: COMPLETED | OUTPATIENT
Start: 2022-09-15 | End: 2022-09-15

## 2022-10-05 DIAGNOSIS — E11.9 TYPE 2 DIABETES MELLITUS WITHOUT COMPLICATION, WITHOUT LONG-TERM CURRENT USE OF INSULIN: ICD-10-CM

## 2022-10-05 RX ORDER — LANCETS 28 GAUGE
EACH MISCELLANEOUS
Qty: 100 EACH | Refills: 0 | Status: SHIPPED | OUTPATIENT
Start: 2022-10-05 | End: 2022-11-01

## 2022-10-11 NOTE — TELEPHONE ENCOUNTER
Pt left message stating her mammo and dexa were scheduled with [facility] and she wants them scheduled with [facility]. She has cancelled appt with [facility]. Requesting we reschedule.   Show Applicator Variable?: Yes Render Note In Bullet Format When Appropriate: No Consent: The patient's consent was obtained including but not limited to risks of crusting, scabbing, blistering, scarring, darker or lighter pigmentary change, recurrence, incomplete removal and infection. Detail Level: Detailed Number Of Freeze-Thaw Cycles: 2 freeze-thaw cycles Duration Of Freeze Thaw-Cycle (Seconds): 3 Post-Care Instructions: I reviewed with the patient in detail post-care instructions. Patient is to wear sunprotection, and avoid picking at any of the treated lesions. Pt may apply Vaseline to crusted or scabbing areas. Application Tool (Optional): Liquid Nitrogen Sprayer Medical Necessity Information: It is in your best interest to select a reason for this procedure from the list below. All of these items fulfill various CMS LCD requirements except the new and changing color options. Number Of Freeze-Thaw Cycles: 3 freeze-thaw cycles Medical Necessity Clause: This procedure was medically necessary because the lesions that were treated were: Spray Paint Text: The liquid nitrogen was applied to the skin utilizing a spray paint frosting technique.

## 2022-11-01 DIAGNOSIS — E11.9 TYPE 2 DIABETES MELLITUS WITHOUT COMPLICATION, WITHOUT LONG-TERM CURRENT USE OF INSULIN: ICD-10-CM

## 2022-11-01 RX ORDER — LANCETS 28 GAUGE
EACH MISCELLANEOUS
Qty: 100 EACH | Refills: 0 | Status: SHIPPED | OUTPATIENT
Start: 2022-11-01

## 2022-11-04 DIAGNOSIS — F34.1 DYSTHYMIC DISORDER: ICD-10-CM

## 2022-11-04 DIAGNOSIS — E11.9 TYPE 2 DIABETES MELLITUS WITHOUT COMPLICATION, WITHOUT LONG-TERM CURRENT USE OF INSULIN: ICD-10-CM

## 2022-11-04 DIAGNOSIS — E03.9 ACQUIRED HYPOTHYROIDISM: ICD-10-CM

## 2022-11-04 RX ORDER — LEVOTHYROXINE SODIUM 0.05 MG/1
50 TABLET ORAL DAILY
Qty: 90 TABLET | Refills: 1 | Status: SHIPPED | OUTPATIENT
Start: 2022-11-04 | End: 2023-01-25 | Stop reason: SDUPTHER

## 2022-11-04 RX ORDER — BUPROPION HYDROCHLORIDE 150 MG/1
150 TABLET, EXTENDED RELEASE ORAL 2 TIMES DAILY
Qty: 180 TABLET | Refills: 1 | Status: SHIPPED | OUTPATIENT
Start: 2022-11-04 | End: 2023-01-25 | Stop reason: SDUPTHER

## 2022-11-04 RX ORDER — METFORMIN HYDROCHLORIDE 500 MG/1
1000 TABLET, EXTENDED RELEASE ORAL 2 TIMES DAILY
Qty: 360 TABLET | Refills: 1 | Status: SHIPPED | OUTPATIENT
Start: 2022-11-04 | End: 2023-01-25 | Stop reason: SDUPTHER

## 2022-11-07 ENCOUNTER — OFFICE VISIT (OUTPATIENT)
Dept: FAMILY MEDICINE CLINIC | Age: 57
End: 2022-11-07

## 2022-11-07 VITALS
BODY MASS INDEX: 41.93 KG/M2 | SYSTOLIC BLOOD PRESSURE: 115 MMHG | WEIGHT: 208 LBS | TEMPERATURE: 98.1 F | HEART RATE: 83 BPM | HEIGHT: 59 IN | DIASTOLIC BLOOD PRESSURE: 74 MMHG
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
## 2022-11-07 DIAGNOSIS — M53.3 SACRAL BACK PAIN: Primary | ICD-10-CM

## 2022-11-07 PROCEDURE — 99214 OFFICE O/P EST MOD 30 MIN: CPT | Performed by: NURSE PRACTITIONER

## 2022-11-17 ENCOUNTER — TELEPHONE (OUTPATIENT)
Dept: FAMILY MEDICINE CLINIC | Age: 57
End: 2022-11-17

## 2022-12-04 DIAGNOSIS — E11.9 TYPE 2 DIABETES MELLITUS WITHOUT COMPLICATION, WITHOUT LONG-TERM CURRENT USE OF INSULIN: ICD-10-CM

## 2022-12-09 DIAGNOSIS — M17.12 PRIMARY OSTEOARTHRITIS OF LEFT KNEE: ICD-10-CM

## 2022-12-09 DIAGNOSIS — M17.11 PRIMARY OSTEOARTHRITIS OF RIGHT KNEE: Primary | ICD-10-CM

## 2022-12-31 DIAGNOSIS — E11.9 TYPE 2 DIABETES MELLITUS WITHOUT COMPLICATION, WITHOUT LONG-TERM CURRENT USE OF INSULIN: ICD-10-CM

## 2023-01-03 RX ORDER — SITAGLIPTIN 100 MG/1
TABLET, FILM COATED ORAL
Qty: 90 TABLET | Refills: 1 | Status: SHIPPED | OUTPATIENT
Start: 2023-01-03 | End: 2023-01-25 | Stop reason: SDUPTHER

## 2023-01-25 DIAGNOSIS — I10 ESSENTIAL (PRIMARY) HYPERTENSION: ICD-10-CM

## 2023-01-25 DIAGNOSIS — F34.1 DYSTHYMIC DISORDER: ICD-10-CM

## 2023-01-25 DIAGNOSIS — E11.9 TYPE 2 DIABETES MELLITUS WITHOUT COMPLICATION, WITHOUT LONG-TERM CURRENT USE OF INSULIN: ICD-10-CM

## 2023-01-25 DIAGNOSIS — E78.2 MIXED HYPERLIPIDEMIA: ICD-10-CM

## 2023-01-25 DIAGNOSIS — E03.9 ACQUIRED HYPOTHYROIDISM: ICD-10-CM

## 2023-01-25 NOTE — TELEPHONE ENCOUNTER
Pt states she has had a change in pharm and needs meds sent to new pharm  
Implemented All Fall Risk Interventions:  [place] to call system. Call bell, personal items and telephone within reach. Instruct patient to call for assistance. Room bathroom lighting operational. Non-slip footwear when patient is off stretcher. Physically safe environment: no spills, clutter or unnecessary equipment. Stretcher in lowest position, wheels locked, appropriate side rails in place. Provide visual cue, wrist band, yellow gown, etc. Monitor gait and stability. Monitor for mental status changes and reorient to person, place, and time. Review medications for side effects contributing to fall risk. Reinforce activity limits and safety measures with patient and family.

## 2023-01-26 RX ORDER — ROSUVASTATIN CALCIUM 10 MG/1
10 TABLET, COATED ORAL DAILY
Qty: 90 TABLET | Refills: 0 | Status: SHIPPED | OUTPATIENT
Start: 2023-01-26

## 2023-01-26 RX ORDER — LOSARTAN POTASSIUM 50 MG/1
50 TABLET ORAL DAILY
Qty: 90 TABLET | Refills: 0 | Status: SHIPPED | OUTPATIENT
Start: 2023-01-26

## 2023-01-26 RX ORDER — BUPROPION HYDROCHLORIDE 150 MG/1
150 TABLET, EXTENDED RELEASE ORAL 2 TIMES DAILY
Qty: 180 TABLET | Refills: 0 | Status: SHIPPED | OUTPATIENT
Start: 2023-01-26

## 2023-01-26 RX ORDER — LEVOTHYROXINE SODIUM 0.05 MG/1
50 TABLET ORAL DAILY
Qty: 90 TABLET | Refills: 0 | Status: SHIPPED | OUTPATIENT
Start: 2023-01-26

## 2023-01-26 RX ORDER — METFORMIN HYDROCHLORIDE 500 MG/1
1000 TABLET, EXTENDED RELEASE ORAL 2 TIMES DAILY
Qty: 360 TABLET | Refills: 0 | Status: SHIPPED | OUTPATIENT
Start: 2023-01-26

## 2023-01-26 RX ORDER — GLIMEPIRIDE 1 MG/1
1 TABLET ORAL
Qty: 90 TABLET | Refills: 0 | Status: SHIPPED | OUTPATIENT
Start: 2023-01-26

## 2023-01-26 RX ORDER — HYDROCHLOROTHIAZIDE 12.5 MG/1
12.5 TABLET ORAL DAILY
Qty: 90 TABLET | Refills: 0 | Status: SHIPPED | OUTPATIENT
Start: 2023-01-26

## 2023-03-27 ENCOUNTER — TELEPHONE (OUTPATIENT)
Dept: FAMILY MEDICINE CLINIC | Age: 58
End: 2023-03-27

## 2023-03-27 ENCOUNTER — TELEPHONE (OUTPATIENT)
Dept: ORTHOPEDIC SURGERY | Facility: CLINIC | Age: 58
End: 2023-03-27
Payer: COMMERCIAL

## 2023-03-27 NOTE — TELEPHONE ENCOUNTER
Caller: Viky Swan    Relationship: Self    Best call back number: [phone]    Who are you requesting to speak with (clinical staff, provider,  specific staff member): MEDICAL STAFF    What was the call regarding: PATIENT IS HAVING KNEE PAIN AND WOULD LIKE TO KNOW IF AN ORDER CAN BE PUT IN FOR AN XRAY. SHE WOULD ALSO LIKE TO KNOW IF SHE CAN HAVE PAIN MEDICATION TO BE ABLE TO MAKE IT THROUGH WORK. PLEASE CALL PATIENT TO ADVISE.    [id number] - [place], [place] - [address] AT [facility] OF [address]  - [phone]  - [phone] FX

## 2023-03-27 NOTE — TELEPHONE ENCOUNTER
Patient has been informed that we are unable to prescribe pain medication for a body part we have not seen.     I have made her an appointment with Robert since Dr. Rivera's scheduling into June right now.    She will have her PCP order an x-ray and MRI if deemed necessary

## 2023-03-27 NOTE — TELEPHONE ENCOUNTER
Caller: [facility]    Relationship to patient: SELF    Best call back number: [phone]    Patient is needing: PATIENT DID NOT WANT TO WAIT UNTIL MAY TO SEE THE PROVIDER.  PATIENT WANTED ME TO SEND A MESSAGE TO SEE IF SHE CAN HAVE PAIN MEDICATION PRESCRIBED OR IF SHE CAN HAVE AN XRAY ORDERED FOR HER LEFT KNEE.

## 2023-03-29 DIAGNOSIS — M25.562 LEFT KNEE PAIN, UNSPECIFIED CHRONICITY: ICD-10-CM

## 2023-03-29 DIAGNOSIS — M25.562 LEFT KNEE PAIN, UNSPECIFIED CHRONICITY: Primary | ICD-10-CM

## 2023-03-29 RX ORDER — DICLOFENAC SODIUM 75 MG/1
75 TABLET, DELAYED RELEASE ORAL 2 TIMES DAILY
Qty: 60 TABLET | Refills: 1 | Status: SHIPPED | OUTPATIENT
Start: 2023-03-29 | End: 2023-05-28

## 2023-03-29 NOTE — TELEPHONE ENCOUNTER
Order entered for left knee xray. Dose increased for diclofenac and sent to [facility].

## 2023-04-03 ENCOUNTER — OFFICE VISIT (OUTPATIENT)
Dept: FAMILY MEDICINE CLINIC | Age: 58
End: 2023-04-03
Payer: COMMERCIAL

## 2023-04-03 VITALS
WEIGHT: 208.4 LBS | TEMPERATURE: 98 F | BODY MASS INDEX: 42.01 KG/M2 | HEART RATE: 87 BPM | SYSTOLIC BLOOD PRESSURE: 133 MMHG | DIASTOLIC BLOOD PRESSURE: 83 MMHG | HEIGHT: 59 IN

## 2023-04-03 DIAGNOSIS — E03.9 ACQUIRED HYPOTHYROIDISM: ICD-10-CM

## 2023-04-03 DIAGNOSIS — E11.9 TYPE 2 DIABETES MELLITUS WITHOUT COMPLICATION, WITHOUT LONG-TERM CURRENT USE OF INSULIN: ICD-10-CM

## 2023-04-03 DIAGNOSIS — S89.92XA LEFT KNEE INJURY, INITIAL ENCOUNTER: Primary | ICD-10-CM

## 2023-04-03 NOTE — PROGRESS NOTES
Chief Complaint  Viky Swan presents to [facility] for Knee Injury (Left knee pain X 1 week )    Subjective          History of Present Illness    Viky is here today with c/o left knee pain. Last week, she was getting in a high chair and felt/heard a pop. Felt immediate pain. She has heard a clicking noise. Xray on 3/29/23 without acute fracture or dislocation, 10 mm superior patellar enthesophyte, mild edema along the patellar tendon and shin. She has been to physical therapy in the past. She does have appointment with ortho scheduled. She has been taking diclofenac which has helped with the swelling. She was unable to bend her knee the day of injury. Pain radiating down leg.   She is currently on glucophage SR, glimepiride, and januvia for diabetes. Last A1C was 8.2.  Also on levothyroxine 50 mcg daily for hypothyroidism.     Review of Systems      Allergies   Allergen Reactions   • Novocain [Procaine] Unknown - Low Severity      Past Medical History:   Diagnosis Date   • Dysthymic disorder    • Essential (primary) hypertension    • Hypercalcemia    • Hypothyroidism, unspecified    • Mixed hyperlipidemia    • Obesity, unspecified    • Other specified menopausal and perimenopausal disorders    • Vitamin B deficiency, unspecified    • Vitamin D deficiency, unspecified      Current Outpatient Medications   Medication Sig Dispense Refill   • aspirin 81 MG EC tablet Take 1 tablet by mouth Daily.     • buPROPion SR (WELLBUTRIN SR) 150 MG 12 hr tablet Take 1 tablet by mouth 2 (Two) Times a Day. 180 tablet 0   • Calcium Carbonate 1500 (600 Ca) MG tablet TAKE 1 TABLET DAILY 90 tablet 3   • diclofenac (VOLTAREN) 75 MG EC tablet Take 1 tablet by mouth 2 (Two) Times a Day for 60 days. 60 tablet 1   • glimepiride (Amaryl) 1 MG tablet Take 1 tablet by mouth Every Morning Before Breakfast. 90 tablet 0   • glucose blood (FREESTYLE LITE) test strip USE AS DIRECTED EVERY  each 2   •  glucose monitoring kit (FREESTYLE) monitoring kit 1 each Daily. 1 each 0   • hydroCHLOROthiazide (HYDRODIURIL) 12.5 MG tablet Take 1 tablet by mouth Daily. 90 tablet 0   • Lancets (freestyle) lancets USE AS DIRECTED TO TEST BLOOD SUGAR 100 each 0   • levothyroxine (SYNTHROID, LEVOTHROID) 50 MCG tablet Take 1 tablet by mouth Daily. 90 tablet 0   • losartan (COZAAR) 50 MG tablet Take 1 tablet by mouth Daily. 90 tablet 0   • metFORMIN ER (GLUCOPHAGE-XR) 500 MG 24 hr tablet Take 2 tablets by mouth 2 (Two) Times a Day. 360 tablet 0   • multivitamin with minerals tablet tablet Take 1 tablet by mouth Daily.     • Omega-3 Fatty Acids (fish oil) 1000 MG capsule capsule Take  by mouth Daily With Breakfast.     • rosuvastatin (CRESTOR) 10 MG tablet Take 1 tablet by mouth Daily. 90 tablet 0   • SITagliptin (Januvia) 100 MG tablet Take 1 tablet by mouth Daily. 90 tablet 0   • vitamin D3 125 MCG (5000 UT) capsule capsule Take 1 capsule by mouth Daily.       No current facility-administered medications for this visit.     Past Surgical History:   Procedure Laterality Date   • TUBAL ABDOMINAL LIGATION        Social History     Tobacco Use   • Smoking status: Never     Passive exposure: Never   • Smokeless tobacco: Never   Vaping Use   • Vaping Use: Never used   Substance Use Topics   • Alcohol use: Not Currently   • Drug use: Never     Family History   Problem Relation Age of Onset   • Lung cancer Father    • Hypertension Sister      Health Maintenance Due   Topic Date Due   • DIABETIC FOOT EXAM  10/22/2022   • ANNUAL PHYSICAL  12/15/2022   • HEMOGLOBIN A1C  03/08/2023      Immunization History   Administered Date(s) Administered   • COVID-19 (MODERNA) 1st, 2nd, 3rd Dose Only 01/27/2021, 02/24/2021, 12/22/2021   • FluLaval/Fluzone >6mos 10/19/2021, 10/13/2022   • Hepatitis A 05/07/2018, 05/18/2018, 11/30/2018, 01/01/2019   • Influenza, Unspecified 10/21/2020   • Pneumococcal Polysaccharide (PPSV23) 10/29/2021   • Td 10/30/2003   •  "Tdap 07/19/2019        Objective     Vitals:    04/03/23 1410   BP: 133/83   BP Location: Left arm   Patient Position: Sitting   Cuff Size: Large Adult   Pulse: 87   Temp: 98 °F (36.7 °C)   TempSrc: Oral   Weight: 94.5 kg (208 lb 6.4 oz)   Height: 149.9 cm (59\")     Body mass index is 42.09 kg/m².     Physical Exam  Vitals reviewed.   Constitutional:       General: She is not in acute distress.     Appearance: Normal appearance. She is well-developed.   HENT:      Head: Normocephalic and atraumatic.   Cardiovascular:      Rate and Rhythm: Normal rate and regular rhythm.   Pulmonary:      Effort: Pulmonary effort is normal.      Breath sounds: Normal breath sounds.   Musculoskeletal:      Left knee: Swelling present. Decreased range of motion. Tenderness present.   Neurological:      Mental Status: She is alert and oriented to person, place, and time.   Psychiatric:         Mood and Affect: Mood and affect normal.           Result Review :                               Assessment and Plan      Diagnoses and all orders for this visit:    1. Left knee injury, initial encounter (Primary)  Comments:  Will order MRI to assess for tendon/ligament injury. May continue NSAIDs, brace. Keep ortho appt.  Orders:  -     MRI Knee Left Without Contrast; Future    2. Type 2 diabetes mellitus without complication, without long-term current use of insulin  Comments:  Will consider Ozempic based on lab findings.   Orders:  -     Comprehensive metabolic panel; Future  -     Hemoglobin A1c; Future  -     Lipid panel; Future    3. Acquired hypothyroidism  Comments:  Medical condition is stable.  Continue same therapy.  Will recheck at next regular appointment  Orders:  -     TSH; Future              Follow Up     Return in about 3 months (around 7/3/2023) for Annual physical.             "

## 2023-04-07 ENCOUNTER — TELEPHONE (OUTPATIENT)
Dept: FAMILY MEDICINE CLINIC | Age: 58
End: 2023-04-07

## 2023-04-07 DIAGNOSIS — M17.11 PRIMARY OSTEOARTHRITIS OF RIGHT KNEE: ICD-10-CM

## 2023-04-07 DIAGNOSIS — M17.11 PRIMARY OSTEOARTHRITIS OF RIGHT KNEE: Primary | ICD-10-CM

## 2023-04-07 RX ORDER — ETODOLAC 400 MG/1
400 TABLET, EXTENDED RELEASE ORAL DAILY
Qty: 30 TABLET | Refills: 0 | Status: SHIPPED | OUTPATIENT
Start: 2023-04-07

## 2023-04-07 RX ORDER — ETODOLAC 400 MG/1
400 TABLET, EXTENDED RELEASE ORAL DAILY
Qty: 30 TABLET | Refills: 0 | Status: SHIPPED | OUTPATIENT
Start: 2023-04-07 | End: 2023-04-07 | Stop reason: SDUPTHER

## 2023-04-07 NOTE — TELEPHONE ENCOUNTER
Caller: Viky Swan    Relationship: Self    Best call back number: [phone]    What medication are you requesting: PAIN MEDICATION    What are your current symptoms: LEFT KNEE PAIN    If a prescription is needed, what is your preferred pharmacy and phone number: [id number] - [place], [place] - [address] AT [facility] OF [address] & [address] [facility] - [phone] [facility] [phone] FX     Additional notes:    PATIENT STATES HER LEFT KNEE IS STILL IN A LOT OF PAIN. PATIENT STATES SHE CAN'T SLEEP BECAUSE OF THE PAIN. PATIENT STATES SHE WOULD LIKE A MEDICATION THAT WONT MAKE HER DROWSY IF POSSIBLE.

## 2023-04-11 ENCOUNTER — TELEPHONE (OUTPATIENT)
Dept: FAMILY MEDICINE CLINIC | Age: 58
End: 2023-04-11
Payer: COMMERCIAL

## 2023-04-11 DIAGNOSIS — S89.92XA LEFT KNEE INJURY, INITIAL ENCOUNTER: ICD-10-CM

## 2023-04-12 DIAGNOSIS — S83.232A COMPLEX TEAR OF MEDIAL MENISCUS OF LEFT KNEE AS CURRENT INJURY, INITIAL ENCOUNTER: Primary | ICD-10-CM

## 2023-04-12 LAB
ALBUMIN SERPL-MCNC: 4.8 G/DL (ref 3.8–4.9)
ALBUMIN/GLOB SERPL: 2.4 {RATIO} (ref 1.2–2.2)
ALP SERPL-CCNC: 88 IU/L (ref 44–121)
ALT SERPL-CCNC: 25 IU/L (ref 0–32)
AMBIG ABBREV CMP14 DEFAULT: NORMAL
AMBIG ABBREV LP DEFAULT: NORMAL
AST SERPL-CCNC: 21 IU/L (ref 0–40)
BILIRUB SERPL-MCNC: 0.3 MG/DL (ref 0–1.2)
BUN SERPL-MCNC: 17 MG/DL (ref 6–24)
BUN/CREAT SERPL: 27 (ref 9–23)
CALCIUM SERPL-MCNC: 9.5 MG/DL (ref 8.7–10.2)
CHLORIDE SERPL-SCNC: 103 MMOL/L (ref 96–106)
CHOLEST SERPL-MCNC: 113 MG/DL (ref 100–199)
CO2 SERPL-SCNC: 23 MMOL/L (ref 20–29)
CREAT SERPL-MCNC: 0.63 MG/DL (ref 0.57–1)
EGFRCR SERPLBLD CKD-EPI 2021: 104 ML/MIN/1.73
GLOBULIN SER CALC-MCNC: 2 G/DL (ref 1.5–4.5)
GLUCOSE SERPL-MCNC: 185 MG/DL (ref 70–99)
HBA1C MFR BLD: 7.5 % (ref 4.8–5.6)
HDLC SERPL-MCNC: 46 MG/DL
LDLC SERPL CALC-MCNC: 49 MG/DL (ref 0–99)
POTASSIUM SERPL-SCNC: 4.5 MMOL/L (ref 3.5–5.2)
PROT SERPL-MCNC: 6.8 G/DL (ref 6–8.5)
SODIUM SERPL-SCNC: 142 MMOL/L (ref 134–144)
TRIGL SERPL-MCNC: 96 MG/DL (ref 0–149)
VLDLC SERPL CALC-MCNC: 18 MG/DL (ref 5–40)

## 2023-04-12 RX ORDER — TRAMADOL HYDROCHLORIDE 50 MG/1
TABLET ORAL
Qty: 24 TABLET | Refills: 0 | Status: SHIPPED | OUTPATIENT
Start: 2023-04-12

## 2023-04-13 DIAGNOSIS — E11.9 TYPE 2 DIABETES MELLITUS WITHOUT COMPLICATION, WITHOUT LONG-TERM CURRENT USE OF INSULIN: Primary | ICD-10-CM

## 2023-04-13 LAB
TSH SERPL DL<=0.005 MIU/L-ACNC: 2.02 UIU/ML (ref 0.45–4.5)
WRITTEN AUTHORIZATION: NORMAL

## 2023-04-13 RX ORDER — SEMAGLUTIDE 1.34 MG/ML
0.25 INJECTION, SOLUTION SUBCUTANEOUS WEEKLY
Qty: 6 ML | Refills: 1 | Status: SHIPPED | OUTPATIENT
Start: 2023-04-13

## 2023-04-17 ENCOUNTER — OFFICE VISIT (OUTPATIENT)
Dept: ORTHOPEDIC SURGERY | Facility: CLINIC | Age: 58
End: 2023-04-17
Payer: COMMERCIAL

## 2023-04-17 ENCOUNTER — PREP FOR SURGERY (OUTPATIENT)
Dept: OTHER | Facility: HOSPITAL | Age: 58
End: 2023-04-17
Payer: COMMERCIAL

## 2023-04-17 VITALS — BODY MASS INDEX: 41.93 KG/M2 | WEIGHT: 208 LBS | HEIGHT: 59 IN | HEART RATE: 108 BPM | OXYGEN SATURATION: 96 %

## 2023-04-17 DIAGNOSIS — S83.242A ACUTE MEDIAL MENISCUS TEAR OF LEFT KNEE, INITIAL ENCOUNTER: Primary | ICD-10-CM

## 2023-04-17 DIAGNOSIS — M17.12 PRIMARY OSTEOARTHRITIS OF LEFT KNEE: ICD-10-CM

## 2023-04-17 RX ORDER — CEFAZOLIN SODIUM IN 0.9 % NACL 3 G/100 ML
3 INTRAVENOUS SOLUTION, PIGGYBACK (ML) INTRAVENOUS ONCE
OUTPATIENT
Start: 2023-04-17 | End: 2023-04-17

## 2023-04-17 RX ORDER — CEFAZOLIN SODIUM 2 G/100ML
2 INJECTION, SOLUTION INTRAVENOUS ONCE
OUTPATIENT
Start: 2023-04-17 | End: 2023-04-17

## 2023-04-17 NOTE — H&P (VIEW-ONLY)
"Chief Complaint  Pain and Initial Evaluation of the Left Knee    Subjective          Viky Swan presents to [facility] for   History of Present Illness    The patient presents here today for evaluation of the left knee. The patient reports left knee pain off and on for awhile. She recently went to stand up from a chair and felt a pop in her knee. She reports instant pain and swelling to the knee. She has no other complaints. She is wearing a knee brace. She is not a smoker.   Allergies   Allergen Reactions   • Novocain [Procaine] Unknown - Low Severity        Social History     Socioeconomic History   • Marital status:    • Number of children: 2   Tobacco Use   • Smoking status: Never     Passive exposure: Never   • Smokeless tobacco: Never   Vaping Use   • Vaping Use: Never used   Substance and Sexual Activity   • Alcohol use: Not Currently   • Drug use: Never   • Sexual activity: Yes     Partners: Male     Birth control/protection: Tubal ligation        I reviewed the patient's chief complaint, history of present illness, review of systems, past medical history, surgical history, family history, social history, medications, and allergy list.     REVIEW OF SYSTEMS    Constitutional: Denies fevers, chills, weight loss  Cardiovascular: Denies chest pain, shortness of breath  Skin: Denies rashes, acute skin changes  Neurologic: Denies headache, loss of consciousness  MSK: Left knee pain      Objective   Vital Signs:   Pulse 108   Ht 149.9 cm (59\")   Wt 94.3 kg (208 lb)   SpO2 96%   BMI 42.01 kg/m²     Body mass index is 42.01 kg/m².    Physical Exam    General: Alert. No acute distress.   Left knee- Full extension. Mild effusion. Mild crepitus. Flexion 110. Stable to varus/valgus stress. Stable to anterior/posterior drawer. Positive EHL, FHL, GS and TA. Sensation intact to all 5 nerves of the foot. Positive pulses. Knee Extensor Mechanism intact. Medial joint line " tenderness. Pain with Yadira's.     Procedures    Imaging Results (Most Recent)     None                   Assessment and Plan        XR knee visionaire protocol    Result Date: 3/29/2023  Narrative: LEFT KNEE     HISTORY: Left lateral knee pain, felt pop. FINDINGS:  A four view exam demonstrates no acute fracture or dislocation. Joint spaces are preserved. 10 mm superior patellar enthesophyte. No joint effusion. Mild subcutaneous edema along the patellar tendon and shin.    Impression: No acute fracture. Images reviewed, interpreted, and dictated by Magdy Garcia DO    MR lower extremity joint only without IV contrast left side    Result Date: 4/11/2023  Narrative: PROCEDURE:  MR LOWER EXTREMITY WITHOUT CONTRAST, LEFT KNEE INDICATION:  Left knee pain. Injury 2 weeks ago. TECHNIQUE:  Multiplanar and multisequence imaging of the left knee were obtained without contrast. COMPARISON:  Plain radiographs dated 3/29/2023. FINDINGS: Motion artifact limits exam quality. Bones/Joints:  There is no bone marrow edema or fracture. A T2 hyperintense lesion in the distal femur has a benign appearance and could represent a small enchondroma or cyst. No full-thickness cartilage defects are identified. There is mild degenerative disease. Menisci:  There is a complex tear of the posterior horn of the medial meniscus which extends into the meniscotibial root attachment. The anterior horn is intact. The lateral meniscus is intact. Ligaments:  The cruciate and collateral ligaments are intact. Muscles/Tendons:  The quadriceps and patellar tendons are intact. The medial and lateral tendons are intact. Soft Tissues:  There is a small to moderate joint effusion.    Impression: 1. Complex tear of the posterior horn of the medial meniscus. 2. Mild degenerative joint disease. Images reviewed, interpreted, and dictated by Gabriella Holman MD       Diagnoses and all orders for this visit:    1. Acute medial meniscus tear of left knee, initial  encounter (Primary)    2. Primary osteoarthritis of left knee        Discussed the treatment options with the patient, operative vs non-operative.   I reviewed the previous images with the patient. Discussed the risks and benefits of a left knee arthroscopic medial menisectomy and chondroplasty vs conservative treatment with home exercises and injections. The patient expressed understanding and wished to proceed with operative treatment      Discussed surgery., Risks/benefits discussed with patient including, but not limited to: infection, bleeding, neurovascular damage, re-rupture, aesthetic deformity, need for further surgery, and death., Discussed with patient the implant type being used during surgery and patient understands., Surgery pamphlet given. and Call or return if worsening symptoms.    Scribed for Jose L Roberson MD by Catalina Lawrence  04/17/2023   14:35 EDT         Follow Up       2 weeks postoperatively.      Patient was given instructions and counseling regarding her condition or for health maintenance advice. Please see specific information pulled into the AVS if appropriate.       I have personally performed the services described in this document as scribed by the above individual and it is both accurate and complete.     Jose L Roberson MD  04/17/23  14:41 EDT

## 2023-04-17 NOTE — PROGRESS NOTES
"Chief Complaint  Pain and Initial Evaluation of the Left Knee    Subjective          Viky Swan presents to [facility] for   History of Present Illness    The patient presents here today for evaluation of the left knee. The patient reports left knee pain off and on for awhile. She recently went to stand up from a chair and felt a pop in her knee. She reports instant pain and swelling to the knee. She has no other complaints. She is wearing a knee brace. She is not a smoker.   Allergies   Allergen Reactions   • Novocain [Procaine] Unknown - Low Severity        Social History     Socioeconomic History   • Marital status:    • Number of children: 2   Tobacco Use   • Smoking status: Never     Passive exposure: Never   • Smokeless tobacco: Never   Vaping Use   • Vaping Use: Never used   Substance and Sexual Activity   • Alcohol use: Not Currently   • Drug use: Never   • Sexual activity: Yes     Partners: Male     Birth control/protection: Tubal ligation        I reviewed the patient's chief complaint, history of present illness, review of systems, past medical history, surgical history, family history, social history, medications, and allergy list.     REVIEW OF SYSTEMS    Constitutional: Denies fevers, chills, weight loss  Cardiovascular: Denies chest pain, shortness of breath  Skin: Denies rashes, acute skin changes  Neurologic: Denies headache, loss of consciousness  MSK: Left knee pain      Objective   Vital Signs:   Pulse 108   Ht 149.9 cm (59\")   Wt 94.3 kg (208 lb)   SpO2 96%   BMI 42.01 kg/m²     Body mass index is 42.01 kg/m².    Physical Exam    General: Alert. No acute distress.   Left knee- Full extension. Mild effusion. Mild crepitus. Flexion 110. Stable to varus/valgus stress. Stable to anterior/posterior drawer. Positive EHL, FHL, GS and TA. Sensation intact to all 5 nerves of the foot. Positive pulses. Knee Extensor Mechanism intact. Medial joint line " tenderness. Pain with Yadira's.     Procedures    Imaging Results (Most Recent)     None                   Assessment and Plan        XR knee visionaire protocol    Result Date: 3/29/2023  Narrative: LEFT KNEE     HISTORY: Left lateral knee pain, felt pop. FINDINGS:  A four view exam demonstrates no acute fracture or dislocation. Joint spaces are preserved. 10 mm superior patellar enthesophyte. No joint effusion. Mild subcutaneous edema along the patellar tendon and shin.    Impression: No acute fracture. Images reviewed, interpreted, and dictated by Magdy Garcia DO    MR lower extremity joint only without IV contrast left side    Result Date: 4/11/2023  Narrative: PROCEDURE:  MR LOWER EXTREMITY WITHOUT CONTRAST, LEFT KNEE INDICATION:  Left knee pain. Injury 2 weeks ago. TECHNIQUE:  Multiplanar and multisequence imaging of the left knee were obtained without contrast. COMPARISON:  Plain radiographs dated 3/29/2023. FINDINGS: Motion artifact limits exam quality. Bones/Joints:  There is no bone marrow edema or fracture. A T2 hyperintense lesion in the distal femur has a benign appearance and could represent a small enchondroma or cyst. No full-thickness cartilage defects are identified. There is mild degenerative disease. Menisci:  There is a complex tear of the posterior horn of the medial meniscus which extends into the meniscotibial root attachment. The anterior horn is intact. The lateral meniscus is intact. Ligaments:  The cruciate and collateral ligaments are intact. Muscles/Tendons:  The quadriceps and patellar tendons are intact. The medial and lateral tendons are intact. Soft Tissues:  There is a small to moderate joint effusion.    Impression: 1. Complex tear of the posterior horn of the medial meniscus. 2. Mild degenerative joint disease. Images reviewed, interpreted, and dictated by Gabriella Holman MD       Diagnoses and all orders for this visit:    1. Acute medial meniscus tear of left knee, initial  encounter (Primary)    2. Primary osteoarthritis of left knee        Discussed the treatment options with the patient, operative vs non-operative.   I reviewed the previous images with the patient. Discussed the risks and benefits of a left knee arthroscopic medial menisectomy and chondroplasty vs conservative treatment with home exercises and injections. The patient expressed understanding and wished to proceed with operative treatment      Discussed surgery., Risks/benefits discussed with patient including, but not limited to: infection, bleeding, neurovascular damage, re-rupture, aesthetic deformity, need for further surgery, and death., Discussed with patient the implant type being used during surgery and patient understands., Surgery pamphlet given. and Call or return if worsening symptoms.    Scribed for Jose L Roberson MD by Catalina Lawrence  04/17/2023   14:35 EDT         Follow Up       2 weeks postoperatively.      Patient was given instructions and counseling regarding her condition or for health maintenance advice. Please see specific information pulled into the AVS if appropriate.       I have personally performed the services described in this document as scribed by the above individual and it is both accurate and complete.     Jose L Roberson MD  04/17/23  14:41 EDT

## 2023-04-28 NOTE — PRE-PROCEDURE INSTRUCTIONS
PATIENT INSTRUCTED TO BE:    - NPO AFTER MIDNIGHT EXCEPT CAN HAVE CLEAR LIQUIDS 2 HOURS PRIOR TO SURGERY ARRIVAL TIME     - TO HOLD ALL VITAMINS, SUPPLEMENTS, NSAIDS FOR ONE WEEK PRIOR TO THEIR SURGICAL PROCEDURE    - INSTRUCTED PT TO USE SURGICAL SOAP 1 TIME THE NIGHT PRIOR TO SURGERY OR THE AM OF SURGERY.   USE SOAP FROM NECK TO TOES AVOID THEIR FACE, HAIR, AND PRIVATE PARTS. INSTRUCTED NO LOTIONS, JEWELRY, PIERCINGS, OR DEODORANT DAY OF SURGERY    - IF DIABETIC, CHECK BLOOD GLUCOSE IF LESS THAN 70 CALL PREOP AREA -AM OF SURGERY [phone]     - INSTRUCTED TO TAKE THE FOLLOWING MEDICATIONS THE DAY OF SURGERY:   Wellbutrin, Tramadol, Crestor, Levothyroxine    PATIENT VERBALIZED UNDERSTANDING

## 2023-05-01 ENCOUNTER — ANESTHESIA EVENT (OUTPATIENT)
Dept: PERIOP | Facility: HOSPITAL | Age: 58
End: 2023-05-01
Payer: COMMERCIAL

## 2023-05-02 ENCOUNTER — HOSPITAL ENCOUNTER (OUTPATIENT)
Facility: HOSPITAL | Age: 58
Setting detail: HOSPITAL OUTPATIENT SURGERY
Discharge: HOME | End: 2023-05-02
Attending: STUDENT IN AN ORGANIZED HEALTH CARE EDUCATION/TRAINING PROGRAM | Admitting: STUDENT IN AN ORGANIZED HEALTH CARE EDUCATION/TRAINING PROGRAM
Payer: COMMERCIAL

## 2023-05-02 ENCOUNTER — ANESTHESIA (OUTPATIENT)
Dept: PERIOP | Facility: HOSPITAL | Age: 58
End: 2023-05-02
Payer: COMMERCIAL

## 2023-05-02 VITALS
HEART RATE: 88 BPM | BODY MASS INDEX: 41.38 KG/M2 | WEIGHT: 205.25 LBS | SYSTOLIC BLOOD PRESSURE: 115 MMHG | HEIGHT: 59 IN | TEMPERATURE: 97.6 F | DIASTOLIC BLOOD PRESSURE: 85 MMHG | RESPIRATION RATE: 19 BRPM | OXYGEN SATURATION: 94 %

## 2023-05-02 DIAGNOSIS — M17.12 PRIMARY OSTEOARTHRITIS OF LEFT KNEE: ICD-10-CM

## 2023-05-02 DIAGNOSIS — S83.242A ACUTE MEDIAL MENISCUS TEAR OF LEFT KNEE, INITIAL ENCOUNTER: ICD-10-CM

## 2023-05-02 LAB — GLUCOSE BLDC GLUCOMTR-MCNC: 190 MG/DL (ref 70–99)

## 2023-05-02 PROCEDURE — 25010000002 FENTANYL CITRATE (PF) 50 MCG/ML SOLUTION: Performed by: NURSE ANESTHETIST, CERTIFIED REGISTERED

## 2023-05-02 PROCEDURE — 25010000002 DEXAMETHASONE PER 1 MG: Performed by: NURSE ANESTHETIST, CERTIFIED REGISTERED

## 2023-05-02 PROCEDURE — 29881 ARTHRS KNE SRG MNISECTMY M/L: CPT | Performed by: PHYSICIAN ASSISTANT

## 2023-05-02 PROCEDURE — 29881 ARTHRS KNE SRG MNISECTMY M/L: CPT | Performed by: STUDENT IN AN ORGANIZED HEALTH CARE EDUCATION/TRAINING PROGRAM

## 2023-05-02 PROCEDURE — 25010000002 KETOROLAC TROMETHAMINE PER 15 MG: Performed by: NURSE ANESTHETIST, CERTIFIED REGISTERED

## 2023-05-02 PROCEDURE — 25010000002 HYDROMORPHONE 1 MG/ML SOLUTION: Performed by: NURSE ANESTHETIST, CERTIFIED REGISTERED

## 2023-05-02 PROCEDURE — 82948 REAGENT STRIP/BLOOD GLUCOSE: CPT

## 2023-05-02 PROCEDURE — 25010000002 ONDANSETRON PER 1 MG: Performed by: NURSE ANESTHETIST, CERTIFIED REGISTERED

## 2023-05-02 PROCEDURE — 25010000002 CEFAZOLIN IN DEXTROSE 2-4 GM/100ML-% SOLUTION: Performed by: STUDENT IN AN ORGANIZED HEALTH CARE EDUCATION/TRAINING PROGRAM

## 2023-05-02 PROCEDURE — 25010000002 PROPOFOL 10 MG/ML EMULSION: Performed by: NURSE ANESTHETIST, CERTIFIED REGISTERED

## 2023-05-02 PROCEDURE — 25010000002 MIDAZOLAM PER 1MG: Performed by: ANESTHESIOLOGY

## 2023-05-02 RX ORDER — ACETAMINOPHEN 500 MG
1000 TABLET ORAL ONCE
Status: COMPLETED | OUTPATIENT
Start: 2023-05-02 | End: 2023-05-02

## 2023-05-02 RX ORDER — MEPERIDINE HYDROCHLORIDE 25 MG/ML
12.5 INJECTION INTRAMUSCULAR; INTRAVENOUS; SUBCUTANEOUS
Status: DISCONTINUED | OUTPATIENT
Start: 2023-05-02 | End: 2023-05-02 | Stop reason: HOSPADM

## 2023-05-02 RX ORDER — CEFAZOLIN SODIUM IN 0.9 % NACL 3 G/100 ML
3 INTRAVENOUS SOLUTION, PIGGYBACK (ML) INTRAVENOUS ONCE
Status: DISCONTINUED | OUTPATIENT
Start: 2023-05-02 | End: 2023-05-02 | Stop reason: DRUGHIGH

## 2023-05-02 RX ORDER — ONDANSETRON 2 MG/ML
4 INJECTION INTRAMUSCULAR; INTRAVENOUS ONCE AS NEEDED
Status: DISCONTINUED | OUTPATIENT
Start: 2023-05-02 | End: 2023-05-02 | Stop reason: HOSPADM

## 2023-05-02 RX ORDER — DOCUSATE SODIUM 100 MG/1
100 CAPSULE, LIQUID FILLED ORAL 2 TIMES DAILY PRN
Qty: 20 CAPSULE | Refills: 0 | Status: SHIPPED | OUTPATIENT
Start: 2023-05-02 | End: 2023-05-15

## 2023-05-02 RX ORDER — BUPIVACAINE HYDROCHLORIDE 2.5 MG/ML
INJECTION, SOLUTION EPIDURAL; INFILTRATION; INTRACAUDAL AS NEEDED
Status: DISCONTINUED | OUTPATIENT
Start: 2023-05-02 | End: 2023-05-02 | Stop reason: HOSPADM

## 2023-05-02 RX ORDER — HYDROCODONE BITARTRATE AND ACETAMINOPHEN 5; 325 MG/1; MG/1
1 TABLET ORAL EVERY 4 HOURS PRN
Qty: 30 TABLET | Refills: 0 | Status: SHIPPED | OUTPATIENT
Start: 2023-05-02 | End: 2023-05-15 | Stop reason: SDUPTHER

## 2023-05-02 RX ORDER — PROMETHAZINE HYDROCHLORIDE 12.5 MG/1
25 TABLET ORAL ONCE AS NEEDED
Status: DISCONTINUED | OUTPATIENT
Start: 2023-05-02 | End: 2023-05-02 | Stop reason: HOSPADM

## 2023-05-02 RX ORDER — PROPOFOL 10 MG/ML
VIAL (ML) INTRAVENOUS AS NEEDED
Status: DISCONTINUED | OUTPATIENT
Start: 2023-05-02 | End: 2023-05-02 | Stop reason: SURG

## 2023-05-02 RX ORDER — ONDANSETRON 2 MG/ML
INJECTION INTRAMUSCULAR; INTRAVENOUS AS NEEDED
Status: DISCONTINUED | OUTPATIENT
Start: 2023-05-02 | End: 2023-05-02 | Stop reason: SURG

## 2023-05-02 RX ORDER — MIDAZOLAM HYDROCHLORIDE 2 MG/2ML
2 INJECTION, SOLUTION INTRAMUSCULAR; INTRAVENOUS ONCE
Status: COMPLETED | OUTPATIENT
Start: 2023-05-02 | End: 2023-05-02

## 2023-05-02 RX ORDER — DEXAMETHASONE SODIUM PHOSPHATE 4 MG/ML
INJECTION, SOLUTION INTRA-ARTICULAR; INTRALESIONAL; INTRAMUSCULAR; INTRAVENOUS; SOFT TISSUE AS NEEDED
Status: DISCONTINUED | OUTPATIENT
Start: 2023-05-02 | End: 2023-05-02 | Stop reason: SURG

## 2023-05-02 RX ORDER — NALOXONE HYDROCHLORIDE 4 MG/.1ML
SPRAY NASAL
Qty: 2 EACH | Refills: 0 | Status: SHIPPED | OUTPATIENT
Start: 2023-05-02 | End: 2023-05-15

## 2023-05-02 RX ORDER — KETOROLAC TROMETHAMINE 30 MG/ML
INJECTION, SOLUTION INTRAMUSCULAR; INTRAVENOUS AS NEEDED
Status: DISCONTINUED | OUTPATIENT
Start: 2023-05-02 | End: 2023-05-02 | Stop reason: SURG

## 2023-05-02 RX ORDER — OXYCODONE HYDROCHLORIDE 5 MG/1
5 TABLET ORAL
Status: COMPLETED | OUTPATIENT
Start: 2023-05-02 | End: 2023-05-02

## 2023-05-02 RX ORDER — LIDOCAINE HYDROCHLORIDE 20 MG/ML
INJECTION, SOLUTION EPIDURAL; INFILTRATION; INTRACAUDAL; PERINEURAL AS NEEDED
Status: DISCONTINUED | OUTPATIENT
Start: 2023-05-02 | End: 2023-05-02 | Stop reason: SURG

## 2023-05-02 RX ORDER — ONDANSETRON 4 MG/1
4 TABLET, FILM COATED ORAL EVERY 8 HOURS PRN
Qty: 30 TABLET | Refills: 0 | Status: SHIPPED | OUTPATIENT
Start: 2023-05-02

## 2023-05-02 RX ORDER — CEFAZOLIN SODIUM 2 G/100ML
2 INJECTION, SOLUTION INTRAVENOUS ONCE
Status: COMPLETED | OUTPATIENT
Start: 2023-05-02 | End: 2023-05-02

## 2023-05-02 RX ORDER — PROMETHAZINE HYDROCHLORIDE 25 MG/1
25 SUPPOSITORY RECTAL ONCE AS NEEDED
Status: DISCONTINUED | OUTPATIENT
Start: 2023-05-02 | End: 2023-05-02 | Stop reason: HOSPADM

## 2023-05-02 RX ORDER — SODIUM CHLORIDE, SODIUM LACTATE, POTASSIUM CHLORIDE, CALCIUM CHLORIDE 600; 310; 30; 20 MG/100ML; MG/100ML; MG/100ML; MG/100ML
9 INJECTION, SOLUTION INTRAVENOUS CONTINUOUS PRN
Status: DISCONTINUED | OUTPATIENT
Start: 2023-05-02 | End: 2023-05-02 | Stop reason: HOSPADM

## 2023-05-02 RX ORDER — FENTANYL CITRATE 50 UG/ML
INJECTION, SOLUTION INTRAMUSCULAR; INTRAVENOUS AS NEEDED
Status: DISCONTINUED | OUTPATIENT
Start: 2023-05-02 | End: 2023-05-02 | Stop reason: SURG

## 2023-05-02 RX ADMIN — PROPOFOL 50 MG: 10 INJECTION, EMULSION INTRAVENOUS at 09:06

## 2023-05-02 RX ADMIN — ONDANSETRON 4 MG: 2 INJECTION INTRAMUSCULAR; INTRAVENOUS at 09:09

## 2023-05-02 RX ADMIN — DEXAMETHASONE SODIUM PHOSPHATE 4 MG: 4 INJECTION, SOLUTION INTRA-ARTICULAR; INTRALESIONAL; INTRAMUSCULAR; INTRAVENOUS; SOFT TISSUE at 08:59

## 2023-05-02 RX ADMIN — LIDOCAINE HYDROCHLORIDE 60 MG: 20 INJECTION, SOLUTION EPIDURAL; INFILTRATION; INTRACAUDAL; PERINEURAL at 08:55

## 2023-05-02 RX ADMIN — OXYCODONE 5 MG: 5 TABLET ORAL at 10:02

## 2023-05-02 RX ADMIN — PROPOFOL 150 MG: 10 INJECTION, EMULSION INTRAVENOUS at 08:55

## 2023-05-02 RX ADMIN — OXYCODONE 5 MG: 5 TABLET ORAL at 10:47

## 2023-05-02 RX ADMIN — FENTANYL CITRATE 50 MCG: 50 INJECTION, SOLUTION INTRAMUSCULAR; INTRAVENOUS at 08:55

## 2023-05-02 RX ADMIN — HYDROMORPHONE HYDROCHLORIDE 0.5 MG: 1 INJECTION, SOLUTION INTRAMUSCULAR; INTRAVENOUS; SUBCUTANEOUS at 10:14

## 2023-05-02 RX ADMIN — MIDAZOLAM HYDROCHLORIDE 2 MG: 1 INJECTION, SOLUTION INTRAMUSCULAR; INTRAVENOUS at 08:40

## 2023-05-02 RX ADMIN — HYDROMORPHONE HYDROCHLORIDE 0.5 MG: 1 INJECTION, SOLUTION INTRAMUSCULAR; INTRAVENOUS; SUBCUTANEOUS at 10:03

## 2023-05-02 RX ADMIN — CEFAZOLIN SODIUM 2 G: 2 INJECTION, SOLUTION INTRAVENOUS at 08:49

## 2023-05-02 RX ADMIN — ACETAMINOPHEN 1000 MG: 500 TABLET ORAL at 08:40

## 2023-05-02 RX ADMIN — SODIUM CHLORIDE, POTASSIUM CHLORIDE, SODIUM LACTATE AND CALCIUM CHLORIDE 9 ML/HR: 600; 310; 30; 20 INJECTION, SOLUTION INTRAVENOUS at 08:40

## 2023-05-02 RX ADMIN — KETOROLAC TROMETHAMINE 30 MG: 30 INJECTION, SOLUTION INTRAMUSCULAR; INTRAVENOUS at 09:36

## 2023-05-02 RX ADMIN — FENTANYL CITRATE 50 MCG: 50 INJECTION, SOLUTION INTRAMUSCULAR; INTRAVENOUS at 09:04

## 2023-05-02 NOTE — ANESTHESIA POSTPROCEDURE EVALUATION
Patient: Viky Swan    Procedure Summary     Date: 05/02/23 Room / Location: [facility] OR  / [facility]    Anesthesia Start: 0849 Anesthesia Stop: 0952    Procedure: KNEE ARTHROSCOPY WITH PARTIAL MENISCECTOMY, CHONDROPLASTY (Left) Diagnosis:       Acute medial meniscus tear of left knee, initial encounter      Primary osteoarthritis of left knee      (Acute medial meniscus tear of left knee, initial encounter [S83.242A])      (Primary osteoarthritis of left knee [M17.12])    Surgeons: Jose L Roberson MD Provider: Kevin Fuentes MD    Anesthesia Type: general ASA Status: 3          Anesthesia Type: general    Vitals  Vitals Value Taken Time   /57 05/02/23 1024   Temp     Pulse 93 05/02/23 1028   Resp     SpO2 97 % 05/02/23 1028   Vitals shown include unvalidated device data.        Post Anesthesia Care and Evaluation    Patient location during evaluation: bedside  Patient participation: complete - patient participated  Level of consciousness: awake  Pain management: adequate    Airway patency: patent  PONV Status: none  Cardiovascular status: acceptable and stable  Respiratory status: acceptable  Hydration status: acceptable    Comments: An Anesthesiologist personally participated in the most demanding procedures (including induction and emergence if applicable) in the anesthesia plan, monitored the course of anesthesia administration at frequent intervals and remained physically present and available for immediate diagnosis and treatment of emergencies.

## 2023-05-02 NOTE — OP NOTE
KNEE ARTHROSCOPY WITH MENISCECTOMY  Procedure Report    Patient Name:  Viky Swan  YOB: 1965    Date of Surgery:  5/2/2023     Indications: Viky is a 57-year-old female with a history of left knee pain.  She had an MRI which revealed a complex tear of the posterior horn medial meniscus and areas of chondrosis throughout the knee.  We discussed treatment options.  We discussed both operative and nonoperative management.  We discussed the risks, benefits, indications, alternatives to left knee arthroscopy with partial medial meniscectomy and possible chondroplasty.  She elected to proceed with surgery and consent was obtained.    Pre-op Diagnosis:   Acute medial meniscus tear of left knee, initial encounter [S83.242A]  Primary osteoarthritis of left knee [M17.12]       Post-Op Diagnosis Codes:     * Acute medial meniscus tear of left knee, initial encounter [S83.242A]     * Primary osteoarthritis of left knee [M17.12]    Procedure/CPT® Codes:      Procedure(s):  KNEE ARTHROSCOPY WITH PARTIAL MEDIAL MENISCECTOMY, CHONDROPLASTY    Staff:  Surgeon(s):  Jose L Roberson MD    Assistant: ELAINE Mackey    Anesthesia: General    Estimated Blood Loss: 10 mL    Implants:    Nothing was implanted during the procedure    Specimen:          None        Findings: Grade 3 chondrosis patellofemoral compartment, grade 2 and central grade 3 chondrosis medial femoral condyle, complex tear medial meniscus posterior horn and root, grade 2 chondrosis lateral compartment    Complications: None    Description of Procedure: The patient was met in the preoperative holding area and the operative extremity was marked.  The patient was transported to the operating room and laid supine on the operating room table.  General anesthesia was induced without complication.  An upper thigh tourniquet was applied but not used during the case.  The left leg was placed into a leg woodard and the foot of the table was dropped.  All  extremities were well padded.  2 g of preoperative Ancef were administered.  The left lower extremity was then prepped and draped in the usual sterile fashion.  A timeout was taken to ensure the appropriate patient, procedure, and procedural site.  All were in agreement.  I began by marking the superficial landmarks over the anterior aspect of the left knee.  A vertical incision for a standard lateral portal was created and the arthroscope was inserted into the patellofemoral compartment.  Grade 2 and grade 3 chondral changes were noted centrally on the trochlea with reciprocal changes on the patella.  No loose or unstable cartilage flaps were identified. The arthroscope was transitioned into the lateral gutter and no loose bodies were identified.  The arthroscope was transitioned into the medial gutter and no loose bodies were identified.  The arthroscope was then transitioned into the medial compartment.  A vertical incision for a medial working portal was created after needle localization.  The arthroscopic shaver was introduced and the fat pad was debrided.  A probe was introduced in the medial compartment was evaluated.    Grade 2 and central grade 3 chondrosis was noted on both the medial tibial plateau and medial femoral condyle.  There was a more well-defined lesion on the medial femoral condyle with unstable cartilage margins.  I utilized the arthroscopic shaver to perform a chondroplasty here to perform a chondroplasty on this lesion.  A complex tear of the posterior horn of the medial meniscus was identified that included the root.  I used the arthroscopic meniscal biter and shaver to perform a partial medial meniscectomy taking this tear back to a stable border.  The posterior root was debrided and the body was tapered. I was satisfied with the work in the medial compartment.  The arthroscope was transitioned into the notch.  The ACL and PCL were identified and were intact.  I then transitioned into the  lateral compartment.    The lateral meniscus was intact and stable to probe.  Grade 2 chondral changes were noted on the lateral tibial plateau. I then transition back into the patellofemoral compartment.  The knee was then thoroughly irrigated and the arthroscopic instrumentation was removed.  The knee was drained of arthroscopic fluid.  Portal sites were closed with 4-0 nylon in interrupted fashion.  I injected 20 cc of 0.5% Marcaine into the portal sites.  The wound was dressed with Xeroform, fluffs, soft roll, and an Ace wrap from the foot to the proximal thigh.  The patient awoke from anesthesia without complication and was transferred to the recovery room in stable condition.  All surgical counts were correct.  The patient had a palpable dorsalis pedis pulse prior to leaving the operating room.      Assistant: ELAINE Mackey was present and her skilled assistance was necessary for patient positioning, manipulating the camera, suturing, closing, dressing application.    Jose L Roberson MD     Date: 5/2/2023  Time: 09:45 EDT

## 2023-05-02 NOTE — INTERVAL H&P NOTE
H&P reviewed. The patient was examined and there are no changes to the H&P.    I have seen and examined the above patient and agree with the plan.     Cardiac: Intact peripheral pulses  Pulmonary: Nonlabored respirations on room air.   Left lower extremity: Skin intact.  Distal neurovascular intact.      We reviewed the risks, benefits, indications, and alternatives to left knee arthroscopy as noted above.  The patient elected to proceed and consent was obtained.    Electronically signed by Jose L Roberson MD, 05/02/23, 8:27 AM EDT.

## 2023-05-02 NOTE — DISCHARGE INSTRUCTIONS
[facility]  Postop Instructions  Outpatient Knee Surgery    DIET  Begin with clear liquids and light foods (jello, soups, etc).  Progress to your normal diet if you are not nauseated.  Take pain medicine with food - crackers, bread, etc.    WOUND CARE  Maintain your operative dressing, loosen bandage if swelling or tingling of the ankle or toes.  It is normal for the knee to bleed and swell from the surgery site.  If blood soaks onto the bandage, do not become alarmed; reinforce with additional dressing.  If blood saturates more than 2 bandages call [facility].  Remove surgical dressing on the 2nd post-operative day.  If minimal drainage is present, apply bandaids over incisions.  Do not use antibiotic ointment.  To avoid infection, keep surgical incisions clean and dry.  You may shower on the 2nd day.  No immersion of operative leg until instructed by staff.    MEDICATIONS  Pain medication is injected into the wound and knee joint during surgery.  This will wear off within 8-12 hours.  Aleve 500mg 2 times per day may be taken for pain if you do not have a history of bleeding or ulcers.  Some patients will require narcotic pain medication for a short period of time.  This can be taken as per directions on the bottle.  Potential narcotic side effects include: constipation, nausea, vomiting, sleepiness.  If nausea and vomiting continue for more than 12-24 hours, contact the office to have your medication changed.  Do not drive a car or operate machinery while taking the prescription pain medication.  Increase vegetables, whole grains, and water intake to decrease risk of constipation related to pain medications.  Drink a full glass of water with every dose of medication (prescription or over the counter) and take with food.    ACTIVITY  When sleeping or resting, elevate the leg with the support of a few pillows at the ankle to reduce swelling and pain.  Do not engage in impact activities which increase  pain/swelling over the first 7-10 days following surgery.  Avoid long periods of sitting or long distance traveling for 2 weeks.  No driving or operating heavy machinery until you are off all prescription pain medications.  You may return to sedentary work or school 1-3 days after surgery, if pain is tolerable.          WEIGHT BEARING and RANGE OF MOTION  Meniscectomy / chondroplasty: Weight bearing as tolerated.      ICE THERAPY  Begin icing immediately after surgery using the compression machine or cold packs.    EXERCISE (see below)  Knee flexion as tolerated 5-10 minutes, 4 times per day.  Begin knee exercises the following day after surgery unless otherwise instructed by the surgeon  Towel Roll extensions 3 times per day for 20 minutes  Knee Flexion Progression 3 times per day  Straight Leg Raises- Hold for 2 seconds, repeat 10 reps, 3 times per day  You may also begin ankle and foot range of motion on the first post-operative day about 2-3 times per day.            Straight Leg Raises                                        Towel Extensions                           EMERGENCIES  Contact [facility] if any of the following are present:  Painful swelling or numbness, tingling, color change, or coolness in the ankle or foot  Unrelenting pain  Fever over 101 (It is normal to have a low grad fever for the first day or two following surgery)  Redness or worsening pain around incisions  Continuous drainage or bleeding from incision (a small amount of drainage is expected)  Difficulty breathing, wheezing  Excessive nausea/vomiting causing inability to keep anything down for 12-24 hours  Inability to urinate    WHAT TO EXPECT AT YOUR FIRST POST OPERATIVE VISIT  Suture/stitches will be removed and new bandage applied.  Follow up Xrays will be taken if indicated.  Next follow-up visit will usually be scheduled for 4-6 weeks         DISCHARGE INSTRUCTIONS  POST-OPERATIVE  Knee Arthroscopic Surgery      For your surgery  you had:  General anesthesia (you may have a sore throat for the first 24 hours)  IV sedation.  Local anesthesia  Monitored anesthesia care  You may experience dizziness, drowsiness, or light-headedness for several hours following surgery/procedure.  Do not stay alone today or tonight.  Limit your activity for 24 hours.  Resume your diet slowly.  Follow whatever special dietary instructions you may have been given by your doctor.  You should not drive or operate machinery or drink alcohol for 24 hours or while you are taking pain medication.  You should not sign legally binding documents.  Post-Operative Day #1  Post-Operative Day #1 is counted as the 1st day after surgery.  Leave ace wrap on day one to aid in the reduction of swelling.  If dressing is too tight it can be rewrapped.  Post-Operative Day #2 Thursday 5/4/23  Ace wrap and dressing may be removed.  Put a 4x4 dressing to suture or staple site.  Change dressing once or twice daily until suture or staples are removed.  It is normal to have some redness or irritation around skin sutures or stapes, however, if you have any expanding areas of redness with a persistent fever and increasing pain notify the physician as soon as possible.  On Post-Operative Day #2, you may want to reapply the ace wrap.  Put ace wrap directly over the knee to add compression and aid in swelling reduction.  It is normal to have some swelling down into the calf and ankle after knee arthroscopy or Anterior Cruciate Ligament (ACL) reconstruction.  If you notice a significant amount of swelling or increasing pain in calf area, notify the physician immediately.   Post-Operative Day #2 Thursday 5/4/23  You may shower.  During shower, avoid too much water to incision site.  Let water run down leg and then pat dry.  Do not put any ointments on the incision unless directed by surgeon.  Reapply dry dressing to sutures or staple sites.    General Information  Full weight bearing with crutches  is allowed after a standard knee arthroscopy or ACL reconstruction unless instructed otherwise by surgeon.  You may put as much weight on knee as tolerable.  If given a knee immobilizer postoperatively, usually with an ACL reconstruction, this is for protection with early ambulation until you are steadier on your feet.  It is very important to take off immobilizer to do range of motion and flexion and extension exercises.  You do not have to sleep in the knee immobilizer.  Knee range of motion exercises should be started as early as the day of surgery.  Try to achieve full extension and start working on range of motion and flexion of the knee.  Move the ankle up and down periodically to help reduce swelling as well as reduce blood pooling.  To allow full extension of the knee and prevent blood clots it is very important to put pillows at the level of the mid-calf to the foot.  DO NOT put pillows directly behind knee.  SPECIAL INSTRUCTIONS:                                                             DISCHARGE INSTRUCTIONS  POST-OPERATIVE   Knee Arthroscopic Surgery      General Instructions  You will receive a prescription for physical therapy, unless otherwise instructed by physician.  Physical therapy is imperative especially after ACL reconstruction and some knee arthroscopies for swelling reduction, knee range of motion and strengthening.  [x]  Use ice pack on the knee for 20 minutes on and 20 minutes off.  Never place ice directly on the skin.  By following this, you will cool the knee sufficiently.  Avoid getting dressing wet.  To help reduce swelling and minimize throbbing pain, use pillows to keep the knee elevated above the level of the heart, even while sleeping.  Sit with the leg propped up on a footstool or chair with pillows.  Exercises toes for 10 minutes every hour while awake.  If you are given a prescription for pain medication, take it as prescribed.  If you are able to take over-the-counter  anti-inflammatory medications such as Motrin or Aleve, you may take as per package instructions in between the pain medication to help enhance pain control and reduce swelling.  If you are taking the pain medication prescribed, do not take Tylenol too unless you consult with the pharmacist. Generally, the pain medications prescribed have Tylenol in them and too much Tylenol can be harmful.  You should stop the anti-inflammatory medication if you experience any stomach/GI disturbances.  Consult with your physician or your pharmacist before taking over-the-counter pain medications/anti-inflammatories. It is not uncommon to have a fever post-operatively especially in the first 24-48 hours.  Deep breathing and coughing and early ambulation will help.  Anti-inflammatories can be used for fever reduction also.  If unable to urinate 6 to 8 hours after surgery or urinating frequently in small amounts, notify the physician or go to the nearest Emergency Room.  NOTIFY YOUR PHYSICIAN IF YOU EXPERIENCE:  Numbness of toes.  Inability to move toes.  Extreme coldness, paleness or blue dis-coloration of toes.  Any pain other than from the incision, such as swelling of the leg that blocks circulation of the toes.  Follow verbal instructions from your doctor.  Medications per physician instructions as indicated on Discharge Medication Information Sheet.  You should see  ______________________for follow-up care  on   .  Phone number: _________________________  Missing your appointment/follow-up could lead to serious complications.  If you have an emergency and cannot reach your doctor, go to an Emergency Room nearest your home.

## 2023-05-02 NOTE — ANESTHESIA PREPROCEDURE EVALUATION
Anesthesia Evaluation     Patient summary reviewed and Nursing notes reviewed   no history of anesthetic complications:  NPO Solid Status: > 8 hours  NPO Liquid Status: > 2 hours           Airway   Mallampati: II  TM distance: >3 FB  Neck ROM: full  No difficulty expected  Dental      Pulmonary - normal exam    breath sounds clear to auscultation  (+) sleep apnea,   Cardiovascular - normal exam  Exercise tolerance: good (4-7 METS)    Rhythm: regular  Rate: normal    (+) hypertension, hyperlipidemia,       Neuro/Psych- negative ROS  GI/Hepatic/Renal/Endo    (+) morbid obesity,  diabetes mellitus, thyroid problem hypothyroidism    Musculoskeletal     Abdominal    Substance History      OB/GYN          Other        ROS/Med Hx Other: >4METS, HX DM, SMALL AIRWAY (NO SUPPORTING DOCUMENTS). TAKES OZEMPIC FOR DM, LAST DOSE 4/29/23. KT                   Anesthesia Plan    ASA 3     general     (Patient understands anesthesia not responsible for dental damage.)  intravenous induction     Anesthetic plan, risks, benefits, and alternatives have been provided, discussed and informed consent has been obtained with: patient.    Plan discussed with CRNA.        CODE STATUS:

## 2023-05-04 DIAGNOSIS — F34.1 DYSTHYMIC DISORDER: ICD-10-CM

## 2023-05-04 DIAGNOSIS — E78.2 MIXED HYPERLIPIDEMIA: ICD-10-CM

## 2023-05-04 DIAGNOSIS — E03.9 ACQUIRED HYPOTHYROIDISM: ICD-10-CM

## 2023-05-04 DIAGNOSIS — I10 ESSENTIAL (PRIMARY) HYPERTENSION: ICD-10-CM

## 2023-05-04 DIAGNOSIS — E11.9 TYPE 2 DIABETES MELLITUS WITHOUT COMPLICATION, WITHOUT LONG-TERM CURRENT USE OF INSULIN: ICD-10-CM

## 2023-05-04 RX ORDER — HYDROCHLOROTHIAZIDE 12.5 MG/1
12.5 TABLET ORAL DAILY
Qty: 90 TABLET | Refills: 0 | Status: SHIPPED | OUTPATIENT
Start: 2023-05-04

## 2023-05-04 RX ORDER — BUPROPION HYDROCHLORIDE 150 MG/1
150 TABLET, EXTENDED RELEASE ORAL 2 TIMES DAILY
Qty: 180 TABLET | Refills: 0 | Status: SHIPPED | OUTPATIENT
Start: 2023-05-04

## 2023-05-04 RX ORDER — ROSUVASTATIN CALCIUM 10 MG/1
10 TABLET, COATED ORAL DAILY
Qty: 90 TABLET | Refills: 0 | Status: SHIPPED | OUTPATIENT
Start: 2023-05-04

## 2023-05-04 RX ORDER — METFORMIN HYDROCHLORIDE 500 MG/1
1000 TABLET, EXTENDED RELEASE ORAL 2 TIMES DAILY
Qty: 360 TABLET | Refills: 0 | Status: SHIPPED | OUTPATIENT
Start: 2023-05-04

## 2023-05-04 RX ORDER — LOSARTAN POTASSIUM 50 MG/1
50 TABLET ORAL DAILY
Qty: 90 TABLET | Refills: 0 | Status: SHIPPED | OUTPATIENT
Start: 2023-05-04

## 2023-05-04 RX ORDER — LEVOTHYROXINE SODIUM 0.05 MG/1
50 TABLET ORAL DAILY
Qty: 90 TABLET | Refills: 0 | Status: SHIPPED | OUTPATIENT
Start: 2023-05-04

## 2023-05-09 DIAGNOSIS — E11.9 TYPE 2 DIABETES MELLITUS WITHOUT COMPLICATION, WITHOUT LONG-TERM CURRENT USE OF INSULIN: ICD-10-CM

## 2023-05-15 ENCOUNTER — OFFICE VISIT (OUTPATIENT)
Dept: ORTHOPEDIC SURGERY | Facility: CLINIC | Age: 58
End: 2023-05-15
Payer: COMMERCIAL

## 2023-05-15 VITALS — BODY MASS INDEX: 41.33 KG/M2 | HEIGHT: 59 IN | WEIGHT: 205 LBS

## 2023-05-15 DIAGNOSIS — S83.242A ACUTE MEDIAL MENISCUS TEAR OF LEFT KNEE, INITIAL ENCOUNTER: Primary | ICD-10-CM

## 2023-05-15 RX ORDER — HYDROCODONE BITARTRATE AND ACETAMINOPHEN 5; 325 MG/1; MG/1
1 TABLET ORAL EVERY 6 HOURS PRN
Qty: 30 TABLET | Refills: 0 | Status: SHIPPED | OUTPATIENT
Start: 2023-05-15

## 2023-05-15 NOTE — PROGRESS NOTES
"Chief Complaint  Follow-up of the Left Knee    Subjective          Viky Swan presents to [facility]   History of Present Illness     Viky Swan presents today for a follow up of her left knee. Patient is 2 weeks post operative left knee arthroscopy with partial meniscectomy and chondroplasty performed by Dr. Roberson on 5/2/2023. Today, patient states that she is doing okay.  She reports some stiffness to her knee.  She has been taking her pain medications as needing.  She denies lower extremity numbness or tingling.  Denies complications, redness, or drainage from her incision sites.  Denies any fever or chills following surgery.  She has been doing her home exercises.      Allergies   Allergen Reactions   • Novocain [Procaine] Unknown - Low Severity        Social History     Socioeconomic History   • Marital status:    • Number of children: 2   Tobacco Use   • Smoking status: Never     Passive exposure: Never   • Smokeless tobacco: Never   Vaping Use   • Vaping Use: Never used   Substance and Sexual Activity   • Alcohol use: Not Currently   • Drug use: Never   • Sexual activity: Yes     Partners: Male     Birth control/protection: Tubal ligation        I reviewed the patient's chief complaint, history of present illness, review of systems, past medical history, surgical history, family history, social history, medications, and allergy list.     REVIEW OF SYSTEMS    Constitutional: Denies fevers, chills, weight loss  Cardiovascular: Denies chest pain, shortness of breath  Skin: Denies rashes, acute skin changes  Neurologic: Denies headache, loss of consciousness  MSK: Left knee pain      Objective   Vital Signs:   Ht 149.9 cm (59\")   Wt 93 kg (205 lb)   BMI 41.40 kg/m²     Body mass index is 41.4 kg/m².    Physical Exam    General: Alert, no acute distress  Left lower extremity: Sutures removed today without complications.  Well-healing arthroscopy portals about " the left knee. No redness or active drainage noted.  No concerning signs for infection.  Minimal knee effusion. 110 degrees of flexion.  Full knee extension. Knee extensor mechanism intact. Knee stable to varus and valgus stress. Calf soft, nontender. Demonstrates active ankle plantarflexion and dorsiflexion. Sensation intact over the dorsal and plantar foot.  Palpable pedal pulses.       Procedures    Imaging Results (Most Recent)     None                Assessment and Plan    Diagnoses and all orders for this visit:    1. Acute medial meniscus tear of left knee, initial encounter (Primary)  -     Ambulatory Referral to Physical Therapy Evaluate and treat, POST OP, Ortho; Stretching, ROM, Strengthening         Viky Swan presents today 2 left weeks postop left knee arthroscopy with partial meniscectomy and chondroplasty performed by Dr. Roberson on 5/2/2023.  Sutures removed today without complications.  Incisions are well-healing.  No active drainage or redness noted.  No concerning signs of infection.  Patient is doing well and denies fever or chills.  Incision site care was reviewed today. Patient instructed not to soak or submerge incision. Do not apply any lotions, creams, or ointments to the incision at this time.  We discussed concerning signs and symptoms regarding the incision site.  Patient understood and agreed. We discussed formal physical therapy versus home exercises.  Patient understood and elected to proceed with physical therapy.  Formal physical therapy was ordered today.  We discussed the importance of these exercises to improve range of motion and strength.  Patient understood and agreed.  Norco was refilled.  We discussed the importance of weaning from this medication.  Patient expressed understanding and agreed.  Use ice and elevation as needed for inflammation.    Patient will follow up in 4 weeks for reevaluation. No new x-rays needed at next visit.       Call or return if symptoms  worsen or patient has any concerns.       Follow Up   Return in about 4 weeks (around 6/12/2023).  Patient was given instructions and counseling regarding her condition or for health maintenance advice. Please see specific information pulled into the AVS if appropriate.     Addis Hubbard PA-C  05/15/23  15:50 EDT

## 2023-06-07 DIAGNOSIS — E11.9 TYPE 2 DIABETES MELLITUS WITHOUT COMPLICATION, WITHOUT LONG-TERM CURRENT USE OF INSULIN: ICD-10-CM

## 2023-06-07 RX ORDER — SEMAGLUTIDE 1.34 MG/ML
0.5 INJECTION, SOLUTION SUBCUTANEOUS WEEKLY
Qty: 5 ML | Refills: 1 | Status: SHIPPED | OUTPATIENT
Start: 2023-06-07

## 2023-06-12 ENCOUNTER — OFFICE VISIT (OUTPATIENT)
Dept: ORTHOPEDIC SURGERY | Facility: CLINIC | Age: 58
End: 2023-06-12
Payer: COMMERCIAL

## 2023-06-12 VITALS — OXYGEN SATURATION: 97 % | HEART RATE: 93 BPM | WEIGHT: 200 LBS | HEIGHT: 59 IN | BODY MASS INDEX: 40.32 KG/M2

## 2023-06-12 DIAGNOSIS — S83.242D ACUTE MEDIAL MENISCUS TEAR OF LEFT KNEE, SUBSEQUENT ENCOUNTER: Primary | ICD-10-CM

## 2023-06-12 PROCEDURE — 99024 POSTOP FOLLOW-UP VISIT: CPT | Performed by: PHYSICIAN ASSISTANT

## 2023-06-12 NOTE — PROGRESS NOTES
"Chief Complaint  Pain and Follow-up of the Left Knee    Subjective          Viky Swan presents to [facility]   History of Present Illness    Viky Swan presents today for a follow-up of her left knee.  Patient is 6 weeks postop left knee meniscectomy and chondroplasty performed on 5/2/2023.  Today, patient states that she is doing okay.  She has been attending physical therapy and reports good range of motion and improving strength.  She still experiences some stiffness to her knee.  She continues to experience swelling.  Denies complications with her incisions.  Denies lower extremity numbness or tingling.  Denies new injuries.      Allergies   Allergen Reactions    Novocain [Procaine] Unknown - Low Severity        Social History     Socioeconomic History    Marital status:     Number of children: 2   Tobacco Use    Smoking status: Never     Passive exposure: Never    Smokeless tobacco: Never   Vaping Use    Vaping Use: Never used   Substance and Sexual Activity    Alcohol use: Not Currently    Drug use: Never    Sexual activity: Yes     Partners: Male     Birth control/protection: Tubal ligation        I reviewed the patient's chief complaint, history of present illness, review of systems, past medical history, surgical history, family history, social history, medications, and allergy list.     REVIEW OF SYSTEMS    Constitutional: Denies fevers, chills, weight loss  Cardiovascular: Denies chest pain, shortness of breath  Skin: Denies rashes, acute skin changes  Neurologic: Denies headache, loss of consciousness  MSK: Left knee pain      Objective   Vital Signs:   Pulse 93   Ht 149.9 cm (59\")   Wt 90.7 kg (200 lb)   SpO2 97%   BMI 40.40 kg/m²     Body mass index is 40.4 kg/m².    Physical Exam    General: Alert. No acute distress.   Left lower extremity: Incisions are well-healed.  No concerning signs for infection.  Full knee extension.  Knee flexion 120.  Knee " extensor mechanism intact.  Knee stable to varus and valgus stress.  Stable to anterior and posterior drawer.  No pain with Yadira testing.  Calf soft, nontender.  Demonstrates active ankle plantarflexion and dorsiflexion.  Sensation intact over the dorsal and plantar foot.  Palpable pedal pulses.    Procedures    Imaging Results (Most Recent)       None                   Assessment and Plan    Diagnoses and all orders for this visit:    1. Acute medial meniscus tear of left knee, subsequent encounter (Primary)        Viky Swan presents today 6 weeks postop left knee meniscectomy and chondroplasty performed on 5/2/2023.  Incisions are well-healed with no concerning signs for infection.  Patient is instructed to continue with formal physical therapy as well as her home exercises.  Continue with range of motion and gradual strengthening exercises as tolerated.  Use ice and elevation for inflammation as needed.      Patient will follow up in 6 weeks for reevaluation.      Call or return if symptoms worsen or patient has any concerns.       Follow Up   Return in about 6 weeks (around 7/24/2023).  Patient was given instructions and counseling regarding her condition or for health maintenance advice. Please see specific information pulled into the AVS if appropriate.     Addis Hubbard PA-C  06/12/23  16:42 EDT

## 2023-07-24 ENCOUNTER — OFFICE VISIT (OUTPATIENT)
Dept: ORTHOPEDIC SURGERY | Facility: CLINIC | Age: 58
End: 2023-07-24
Payer: COMMERCIAL

## 2023-07-24 VITALS
WEIGHT: 190 LBS | DIASTOLIC BLOOD PRESSURE: 81 MMHG | BODY MASS INDEX: 38.3 KG/M2 | OXYGEN SATURATION: 96 % | HEART RATE: 91 BPM | HEIGHT: 59 IN | SYSTOLIC BLOOD PRESSURE: 130 MMHG

## 2023-07-24 DIAGNOSIS — S83.242D ACUTE MEDIAL MENISCUS TEAR OF LEFT KNEE, SUBSEQUENT ENCOUNTER: Primary | ICD-10-CM

## 2023-07-24 PROCEDURE — 99213 OFFICE O/P EST LOW 20 MIN: CPT | Performed by: PHYSICIAN ASSISTANT

## 2023-07-24 NOTE — PROGRESS NOTES
"Chief Complaint  Follow-up of the Left Knee    Subjective          Viky Swan presents to [facility]   History of Present Illness    Viky Swan presents today for a follow-up of her left knee.  Patient is 3 months postop left knee uroscopy performed on 5/2/2023.  Today, patient states that she is doing well.  She has been attending physical therapy.  She reports good range of motion.  She does experience some tightness to the posterior knee at times, especially after a long day at work.  She reports no new injuries or concerns.      Allergies   Allergen Reactions    Novocain [Procaine] Unknown - Low Severity        Social History     Socioeconomic History    Marital status:     Number of children: 2   Tobacco Use    Smoking status: Never     Passive exposure: Never    Smokeless tobacco: Never   Vaping Use    Vaping Use: Never used   Substance and Sexual Activity    Alcohol use: Not Currently    Drug use: Never    Sexual activity: Yes     Partners: Male     Birth control/protection: Tubal ligation        I reviewed the patient's chief complaint, history of present illness, review of systems, past medical history, surgical history, family history, social history, medications, and allergy list.     REVIEW OF SYSTEMS    Constitutional: Denies fevers, chills, weight loss  Cardiovascular: Denies chest pain, shortness of breath  Skin: Denies rashes, acute skin changes  Neurologic: Denies headache, loss of consciousness  MSK: Left knee pain      Objective   Vital Signs:   /81   Pulse 91   Ht 149.9 cm (59\")   Wt 86.2 kg (190 lb)   SpO2 96%   BMI 38.38 kg/m²     Body mass index is 38.38 kg/m².    Physical Exam    General: Alert. No acute distress.   Left lower extremity: Well-healed arthroscopy portals.  No effusion.  Full knee extension.  Knee flexion 120.  Knee extensor mechanism intact.  Knee stable to varus and valgus stress.  No pain with Yadira testing.  Calf " soft, nontender.  Demonstrates active ankle plantarflexion dorsiflexion.  Sensation intact over dorsal plantar foot.  Palpable pedal pulses.    Procedures    Imaging Results (Most Recent)       None                   Assessment and Plan    Diagnoses and all orders for this visit:    1. Acute medial meniscus tear of left knee, subsequent encounter (Primary)  -     Diclofenac Sodium (VOLTAREN) 1 % gel gel; Apply 4 g topically to the appropriate area as directed 4 (Four) Times a Day.  Dispense: 100 g; Refill: 0        Viky Swan presents today 3 months postop left knee arthroscopy performed on 5/2/2023.  Arthroscopy portals are well-healed with no concerning signs for infection.  Patient is instructed to continue with formal physical therapy as well as her home exercises.  Continue to progress with strengthening exercises as tolerated.  Voltaren gel was ordered today.  Okay to take Tylenol as needed.  We discussed swelling management with ice and elevation as needed.      Patient will follow up as needed.      Call or return if symptoms worsen or patient has any concerns.       Follow Up   Return if symptoms worsen or fail to improve.  Patient was given instructions and counseling regarding her condition or for health maintenance advice. Please see specific information pulled into the AVS if appropriate.     Addis Hubbard PA-C  07/24/23  17:17 EDT

## 2023-08-09 DIAGNOSIS — R92.8 ABNORMALITY OF RIGHT BREAST ON SCREENING MAMMOGRAM: Primary | ICD-10-CM

## 2023-08-10 DIAGNOSIS — R92.8 ABNORMALITY OF RIGHT BREAST ON SCREENING MAMMOGRAM: Primary | ICD-10-CM

## 2023-08-17 DIAGNOSIS — R92.8 ABNORMALITY OF RIGHT BREAST ON SCREENING MAMMOGRAM: ICD-10-CM

## 2023-08-18 DIAGNOSIS — R92.8 ABNORMALITY OF RIGHT BREAST ON SCREENING MAMMOGRAM: Primary | ICD-10-CM

## 2023-08-30 ENCOUNTER — TELEPHONE (OUTPATIENT)
Dept: FAMILY MEDICINE CLINIC | Age: 58
End: 2023-08-30
Payer: COMMERCIAL

## 2023-08-30 NOTE — TELEPHONE ENCOUNTER
[facility] has [facility] completed? We have not received any results.

## 2023-09-07 DIAGNOSIS — R92.8 ABNORMALITY OF RIGHT BREAST ON SCREENING MAMMOGRAM: ICD-10-CM

## 2023-09-11 DIAGNOSIS — I10 ESSENTIAL (PRIMARY) HYPERTENSION: ICD-10-CM

## 2023-09-11 DIAGNOSIS — E03.9 ACQUIRED HYPOTHYROIDISM: ICD-10-CM

## 2023-09-11 RX ORDER — LOSARTAN POTASSIUM 50 MG/1
50 TABLET ORAL DAILY
Qty: 90 TABLET | Refills: 0 | Status: SHIPPED | OUTPATIENT
Start: 2023-09-11

## 2023-09-11 RX ORDER — LEVOTHYROXINE SODIUM 0.05 MG/1
50 TABLET ORAL DAILY
Qty: 90 TABLET | Refills: 0 | Status: SHIPPED | OUTPATIENT
Start: 2023-09-11

## 2023-10-13 ENCOUNTER — OFFICE VISIT (OUTPATIENT)
Dept: FAMILY MEDICINE CLINIC | Age: 58
End: 2023-10-13
Payer: COMMERCIAL

## 2023-10-13 VITALS
WEIGHT: 180 LBS | SYSTOLIC BLOOD PRESSURE: 124 MMHG | BODY MASS INDEX: 36.29 KG/M2 | HEIGHT: 59 IN | OXYGEN SATURATION: 98 % | DIASTOLIC BLOOD PRESSURE: 58 MMHG | HEART RATE: 91 BPM | TEMPERATURE: 98.2 F

## 2023-10-13 DIAGNOSIS — E03.9 ACQUIRED HYPOTHYROIDISM: ICD-10-CM

## 2023-10-13 DIAGNOSIS — E11.9 TYPE 2 DIABETES MELLITUS WITHOUT COMPLICATION, WITHOUT LONG-TERM CURRENT USE OF INSULIN: ICD-10-CM

## 2023-10-13 DIAGNOSIS — I10 ESSENTIAL (PRIMARY) HYPERTENSION: ICD-10-CM

## 2023-10-13 DIAGNOSIS — M85.89 OTHER SPECIFIED DISORDERS OF BONE DENSITY AND STRUCTURE, MULTIPLE SITES: ICD-10-CM

## 2023-10-13 LAB
EXPIRATION DATE: ABNORMAL
HBA1C MFR BLD: 6 % (ref 4.5–5.7)
Lab: ABNORMAL

## 2023-10-13 NOTE — PROGRESS NOTES
Chief Complaint  Viky Swan presents to [facility] for Hypertension (Medication review )    Subjective          History of Present Illness    Viky is following up on diabetes. Current medication includes januvia, ozempic, metformin. Last A1C 7.1. She notes that blood sugars have improved since starting the ozempic. UTD eye exam. Also taking rosuvastatin and losartan.  She is also on HCTZ 12.5 mg daily.   For hypothyroidism, she is taking levothyroxine 50 mcg daily. Last TSH normal.     Review of Systems      Allergies   Allergen Reactions    Novocain [Procaine] Unknown - Low Severity      Past Medical History:   Diagnosis Date    Anesthesia     small airway    Diabetes mellitus     ave blood surger 165    Dysthymic disorder     Essential (primary) hypertension     Fatty liver     Hypercalcemia     Hypothyroidism, unspecified     Knee swelling 2020    Low back strain     Lumbosacral disc disease     Mixed hyperlipidemia     Obesity, unspecified     Other specified menopausal and perimenopausal disorders     Sleep apnea in adult     Stress incontinence     Tear of meniscus of knee MRI 4-    Vitamin B deficiency, unspecified     Vitamin D deficiency, unspecified      Current Outpatient Medications   Medication Sig Dispense Refill    aspirin 81 MG EC tablet Take 1 tablet by mouth Daily. Not prescribed 4-10-23      buPROPion SR (WELLBUTRIN SR) 150 MG 12 hr tablet Take 1 tablet by mouth 2 (Two) Times a Day. 180 tablet 0    Calcium Carbonate 1500 (600 Ca) MG tablet Take 1 tablet by mouth Daily. 90 tablet 3    Continuous Blood Gluc  (Dexcom G7 ) device 1 each Daily. 1 each 0    Continuous Blood Gluc Sensor (Dexcom G7 Sensor) misc 1 each Daily. 1 each 0    Diclofenac Sodium (VOLTAREN) 1 % gel gel Apply 4 g topically to the appropriate area as directed 4 (Four) Times a Day. 100 g 0    glucose blood (FREESTYLE LITE) test strip USE AS DIRECTED EVERY  each 2     hydroCHLOROthiazide (HYDRODIURIL) 12.5 MG tablet Take 1 tablet by mouth Daily. 90 tablet 0    Lancets (freestyle) lancets USE AS DIRECTED TO TEST BLOOD SUGAR 100 each 0    levothyroxine (SYNTHROID, LEVOTHROID) 50 MCG tablet Take 1 tablet by mouth Daily. 90 tablet 0    losartan (COZAAR) 50 MG tablet Take 1 tablet by mouth Daily. 90 tablet 0    metFORMIN ER (GLUCOPHAGE-XR) 500 MG 24 hr tablet Take 2 tablets by mouth 2 (Two) Times a Day. 360 tablet 0    multivitamin with minerals tablet tablet Take 1 tablet by mouth Daily.      Omega-3 Fatty Acids (fish oil) 1000 MG capsule capsule Take  by mouth Daily With Breakfast.      rosuvastatin (CRESTOR) 10 MG tablet Take 1 tablet by mouth Daily. 90 tablet 0    Semaglutide, 1 MG/DOSE, (Ozempic, 1 MG/DOSE,) 4 MG/3ML solution pen-injector Inject 1 mg under the skin into the appropriate area as directed 1 (One) Time Per Week. 10 mL 1    vitamin D3 125 MCG (5000 UT) capsule capsule Take 1 capsule by mouth Daily.       No current facility-administered medications for this visit.     Past Surgical History:   Procedure Laterality Date     SECTION      KNEE ARTHROSCOPY W/ MENISCECTOMY Left 2023    Procedure: KNEE ARTHROSCOPY WITH PARTIAL MENISCECTOMY, CHONDROPLASTY;  Surgeon: Jose L Roberson MD;  [place] [facility] [place] [facility] [place]  Service: [place]opedics;  Laterality: Left;    TUBAL ABDOMINAL LIGATION        Social History     Tobacco Use    Smoking status: Never     Passive exposure: Never    Smokeless tobacco: Never   Vaping Use    Vaping Use: Never used   Substance Use Topics    Alcohol use: Not Currently    Drug use: Never     Family History   Problem Relation Age of Onset    Lung cancer Father     Hypertension Sister      There are no preventive care reminders to display for this patient.     Immunization History   Administered Date(s) Administered    COVID-19 (MODERNA) 1st,2nd,3rd Dose Monovalent 2021, 2021, 2021    Fluzone (or Fluarix & Flulaval for VFC)  ">6mos 10/19/2021, 10/13/2022    Hepatitis A 05/07/2018, 05/18/2018, 11/30/2018, 01/01/2019    Influenza Seasonal Injectable 10/10/2023    Influenza, Unspecified 10/21/2020    Pneumococcal Polysaccharide (PPSV23) 10/29/2021    Td (TDVAX) 10/30/2003    Tdap 07/19/2019        Objective     Vitals:    10/13/23 1251 10/13/23 1341   BP: 144/63 124/58   BP Location: Left arm Left arm   Patient Position: Sitting Sitting   Cuff Size: Adult    Pulse: 97 91   Temp: 98.2 øF (36.8 øC)    TempSrc: Oral    SpO2: 98%    Weight: 81.6 kg (180 lb)    Height: 149.9 cm (59.02\")      Body mass index is 36.34 kg/mý.             Physical Exam  Vitals reviewed.   Constitutional:       General: She is not in acute distress.     Appearance: Normal appearance. She is well-developed.   HENT:      Head: Normocephalic and atraumatic.   Cardiovascular:      Rate and Rhythm: Normal rate and regular rhythm.   Pulmonary:      Effort: Pulmonary effort is normal.      Breath sounds: Normal breath sounds.   Neurological:      Mental Status: She is alert and oriented to person, place, and time.   Psychiatric:         Mood and Affect: Mood and affect normal.           Result Review :     The following data was reviewed by: AYAD Mobley on 10/13/2023:          POC Glycosylated Hemoglobin (Hb A1C) (10/13/2023 13:13)                 Assessment and Plan      Diagnoses and all orders for this visit:    1. Type 2 diabetes mellitus without complication, without long-term current use of insulin  Comments:  Stop Januvia. Continue metformin and ozempic, diabetic diet.  Orders:  -     POC Glycosylated Hemoglobin (Hb A1C)    2. Essential (primary) hypertension  Comments:  Medical condition is stable.  Continue same therapy.  Will recheck at next regular appointment.    3. Acquired hypothyroidism  Comments:  Medical condition is stable.  Continue same therapy.  Will recheck at next regular appointment    4. Other specified disorders of bone density and " structure, multiple sites  Comments:  Continue calcium and vitamin D  Orders:  -     Calcium Carbonate 1500 (600 Ca) MG tablet; Take 1 tablet by mouth Daily.  Dispense: 90 tablet; Refill: 3              Follow Up     Return in about 3 months (around 1/13/2024) for Recheck.

## 2023-10-18 DIAGNOSIS — E78.2 MIXED HYPERLIPIDEMIA: ICD-10-CM

## 2023-10-18 RX ORDER — ROSUVASTATIN CALCIUM 10 MG/1
10 TABLET, COATED ORAL DAILY
Qty: 90 TABLET | Refills: 0 | Status: SHIPPED | OUTPATIENT
Start: 2023-10-18

## 2023-11-29 ENCOUNTER — OFFICE VISIT (OUTPATIENT)
Dept: FAMILY MEDICINE CLINIC | Age: 58
End: 2023-11-29
Payer: COMMERCIAL

## 2023-11-29 VITALS
OXYGEN SATURATION: 100 % | DIASTOLIC BLOOD PRESSURE: 61 MMHG | BODY MASS INDEX: 36.47 KG/M2 | HEIGHT: 59 IN | TEMPERATURE: 98.2 F | WEIGHT: 180.9 LBS | SYSTOLIC BLOOD PRESSURE: 134 MMHG | HEART RATE: 88 BPM

## 2023-11-29 DIAGNOSIS — J40 BRONCHITIS: ICD-10-CM

## 2023-11-29 DIAGNOSIS — J32.9 SINUSITIS, UNSPECIFIED CHRONICITY, UNSPECIFIED LOCATION: Primary | ICD-10-CM

## 2023-11-29 LAB
EXPIRATION DATE: NORMAL
FLUAV AG UPPER RESP QL IA.RAPID: NOT DETECTED
FLUBV AG UPPER RESP QL IA.RAPID: NOT DETECTED
INTERNAL CONTROL: NORMAL
Lab: NORMAL
SARS-COV-2 AG UPPER RESP QL IA.RAPID: NOT DETECTED

## 2023-11-29 PROCEDURE — 99213 OFFICE O/P EST LOW 20 MIN: CPT | Performed by: NURSE PRACTITIONER

## 2023-11-29 PROCEDURE — 87428 SARSCOV & INF VIR A&B AG IA: CPT | Performed by: NURSE PRACTITIONER

## 2023-11-29 RX ORDER — AZITHROMYCIN 250 MG/1
TABLET, FILM COATED ORAL
Qty: 6 TABLET | Refills: 0 | Status: SHIPPED | OUTPATIENT
Start: 2023-11-29

## 2023-11-29 RX ORDER — ALBUTEROL SULFATE 90 UG/1
2 AEROSOL, METERED RESPIRATORY (INHALATION) EVERY 4 HOURS PRN
Qty: 18 G | Refills: 0 | Status: SHIPPED | OUTPATIENT
Start: 2023-11-29

## 2023-11-29 RX ORDER — GUAIFENESIN 600 MG/1
600 TABLET, EXTENDED RELEASE ORAL 2 TIMES DAILY PRN
Qty: 60 TABLET | Refills: 0 | Status: SHIPPED | OUTPATIENT
Start: 2023-11-29

## 2023-11-29 RX ORDER — BENZONATATE 100 MG/1
100 CAPSULE ORAL 3 TIMES DAILY PRN
Qty: 40 CAPSULE | Refills: 0 | Status: SHIPPED | OUTPATIENT
Start: 2023-11-29

## 2023-11-29 NOTE — LETTER
November 29, 2023     Patient: Viky Swan   YOB: 1965   Date of Visit: 11/29/2023       To Whom It May Concern:    It is my medical opinion that Viky Swan  be excused from work on Tuesday 11/28/23 and Wednesday 11/29/23. .           Sincerely,        AYAD Mobley    CC: No Recipients

## 2023-11-29 NOTE — PROGRESS NOTES
Chief Complaint  Viky Swan presents to [facility] for Nasal Congestion (Congestion, cough, body aches, chills, X 3 weeks )    Subjective          History of Present Illness    Viky is here today with c/o symptoms that started 3 weeks ago. Started with nasal congestion and cough. Seems to have moved down to chest. Continues with cough and congestion, chills. Taking coricidin and Advil cold and sinus.     Review of Systems      Allergies   Allergen Reactions    Novocain [Procaine] Unknown - Low Severity      Past Medical History:   Diagnosis Date    Anesthesia     small airway    Diabetes mellitus     ave blood surger 165    Dysthymic disorder     Essential (primary) hypertension     Fatty liver     Hypercalcemia     Hypothyroidism, unspecified     Knee swelling 2020    Low back strain     Lumbosacral disc disease     Mixed hyperlipidemia     Obesity, unspecified     Other specified menopausal and perimenopausal disorders     Sleep apnea in adult     Stress incontinence     Tear of meniscus of knee MRI 4-    Vitamin B deficiency, unspecified     Vitamin D deficiency, unspecified      Current Outpatient Medications   Medication Sig Dispense Refill    aspirin 81 MG EC tablet Take 1 tablet by mouth Daily. Not prescribed 4-10-23      buPROPion SR (WELLBUTRIN SR) 150 MG 12 hr tablet Take 1 tablet by mouth 2 (Two) Times a Day. 180 tablet 0    Calcium Carbonate 1500 (600 Ca) MG tablet Take 1 tablet by mouth Daily. 90 tablet 3    Continuous Blood Gluc  (Dexcom G7 ) device 1 each Daily. 1 each 0    glucose blood (FREESTYLE LITE) test strip USE AS DIRECTED EVERY  each 2    hydroCHLOROthiazide (HYDRODIURIL) 12.5 MG tablet Take 1 tablet by mouth Daily. 90 tablet 0    Lancets (freestyle) lancets USE AS DIRECTED TO TEST BLOOD SUGAR 100 each 0    levothyroxine (SYNTHROID, LEVOTHROID) 50 MCG tablet Take 1 tablet by mouth Daily. 90 tablet 0    losartan (COZAAR) 50  MG tablet Take 1 tablet by mouth Daily. 90 tablet 0    metFORMIN ER (GLUCOPHAGE-XR) 500 MG 24 hr tablet Take 2 tablets by mouth 2 (Two) Times a Day. 360 tablet 0    multivitamin with minerals tablet tablet Take 1 tablet by mouth Daily.      Omega-3 Fatty Acids (fish oil) 1000 MG capsule capsule Take  by mouth Daily With Breakfast.      rosuvastatin (CRESTOR) 10 MG tablet Take 1 tablet by mouth Daily. 90 tablet 0    Semaglutide, 1 MG/DOSE, (Ozempic, 1 MG/DOSE,) 4 MG/3ML solution pen-injector Inject 1 mg under the skin into the appropriate area as directed 1 (One) Time Per Week. 10 mL 1    vitamin D3 125 MCG (5000 UT) capsule capsule Take 1 capsule by mouth Daily.      albuterol sulfate  (90 Base) MCG/ACT inhaler Inhale 2 puffs Every 4 (Four) Hours As Needed for Shortness of Air or Wheezing. 18 g 0    azithromycin (Zithromax Z-Narinder) 250 MG tablet Take 2 tablets by mouth on day 1, then 1 tablet daily on days 2-5 6 tablet 0    benzonatate (Tessalon Perles) 100 MG capsule Take 1 capsule by mouth 3 (Three) Times a Day As Needed for Cough. 40 capsule 0    guaiFENesin (Mucinex) 600 MG 12 hr tablet Take 1 tablet by mouth 2 (Two) Times a Day As Needed for Cough. 60 tablet 0     No current facility-administered medications for this visit.     Past Surgical History:   Procedure Laterality Date     SECTION      KNEE ARTHROSCOPY W/ MENISCECTOMY Left 2023    Procedure: KNEE ARTHROSCOPY WITH PARTIAL MENISCECTOMY, CHONDROPLASTY;  Surgeon: Jose L Roberson MD;  [place] [facility] [place] [facility] [place]  Service: [place]opedics;  Laterality: Left;    TUBAL ABDOMINAL LIGATION        Social History     Tobacco Use    Smoking status: Never     Passive exposure: Never    Smokeless tobacco: Never   Vaping Use    Vaping Use: Never used   Substance Use Topics    Alcohol use: Not Currently    Drug use: Never     Family History   Problem Relation Age of Onset    Lung cancer Father     Hypertension Sister      Health Maintenance Due   Topic Date  "Due    ZOSTER VACCINE (1 of 2) Never done    Pneumococcal Vaccine 0-64 (2 - PCV) 10/29/2022      Immunization History   Administered Date(s) Administered    COVID-19 (MODERNA) 1st,2nd,3rd Dose Monovalent 01/27/2021, 02/24/2021, 12/22/2021    COVID-19 F23 (PFIZER) 12YRS+ (COMIRNATY) 10/18/2023    Fluzone (or Fluarix & Flulaval for VFC) >6mos 10/19/2021, 10/13/2022    Hepatitis A 05/07/2018, 05/18/2018, 11/30/2018, 01/01/2019    Influenza Seasonal Injectable 10/10/2023    Influenza, Unspecified 10/21/2020    Pneumococcal Polysaccharide (PPSV23) 10/29/2021    Td (TDVAX) 10/30/2003    Tdap 07/19/2019        Objective     Vitals:    11/29/23 0939 11/29/23 0943   BP: 142/60 134/61   BP Location: Left arm Right arm   Patient Position: Sitting Sitting   Cuff Size: Large Adult Large Adult   Pulse: 86 88   Temp: 98.2 °F (36.8 °C)    TempSrc: Oral    SpO2: 100%    Weight: 82.1 kg (180 lb 14.4 oz)    Height: 149.9 cm (59.02\")      Body mass index is 36.51 kg/m².             Physical Exam  Vitals reviewed.   Constitutional:       General: She is not in acute distress.     Appearance: Normal appearance. She is well-developed.   HENT:      Head: Normocephalic and atraumatic.      Right Ear: Ear canal normal. A middle ear effusion is present.      Left Ear: Ear canal normal. A middle ear effusion is present.      Nose: Congestion present.      Right Sinus: Maxillary sinus tenderness present.      Left Sinus: Maxillary sinus tenderness present.      Mouth/Throat:      Mouth: Mucous membranes are moist.      Comments: Uvula midline  Eyes:      Extraocular Movements: Extraocular movements intact.      Pupils: Pupils are equal, round, and reactive to light.   Cardiovascular:      Rate and Rhythm: Normal rate and regular rhythm.   Pulmonary:      Effort: Pulmonary effort is normal.      Breath sounds: Normal breath sounds.   Neurological:      Mental Status: She is alert and oriented to person, place, and time.   Psychiatric:         " Mood and Affect: Mood and affect normal.           Result Review :     The following data was reviewed by: AYAD Mobley on 11/29/2023:          POCT SARS-CoV-2 Antigen CASTILLO + Flu (11/29/2023 10:15)                 Assessment and Plan      Diagnoses and all orders for this visit:    1. Sinusitis, unspecified chronicity, unspecified location (Primary)  -     POCT SARS-CoV-2 Antigen CASTILLO + Flu  -     azithromycin (Zithromax Z-Narinder) 250 MG tablet; Take 2 tablets by mouth on day 1, then 1 tablet daily on days 2-5  Dispense: 6 tablet; Refill: 0    2. Bronchitis  -     guaiFENesin (Mucinex) 600 MG 12 hr tablet; Take 1 tablet by mouth 2 (Two) Times a Day As Needed for Cough.  Dispense: 60 tablet; Refill: 0  -     benzonatate (Tessalon Perles) 100 MG capsule; Take 1 capsule by mouth 3 (Three) Times a Day As Needed for Cough.  Dispense: 40 capsule; Refill: 0  -     albuterol sulfate  (90 Base) MCG/ACT inhaler; Inhale 2 puffs Every 4 (Four) Hours As Needed for Shortness of Air or Wheezing.  Dispense: 18 g; Refill: 0      Covid and flu testing negative. Advised rest, fluids.         Follow Up     Return for As needed for persistent or worsening symptoms.

## 2023-12-13 DIAGNOSIS — I10 ESSENTIAL (PRIMARY) HYPERTENSION: ICD-10-CM

## 2023-12-13 DIAGNOSIS — E03.9 ACQUIRED HYPOTHYROIDISM: ICD-10-CM

## 2023-12-13 DIAGNOSIS — J40 BRONCHITIS: ICD-10-CM

## 2023-12-13 DIAGNOSIS — E78.2 MIXED HYPERLIPIDEMIA: ICD-10-CM

## 2023-12-13 DIAGNOSIS — E11.9 TYPE 2 DIABETES MELLITUS WITHOUT COMPLICATION, WITHOUT LONG-TERM CURRENT USE OF INSULIN: ICD-10-CM

## 2023-12-13 DIAGNOSIS — F34.1 DYSTHYMIC DISORDER: ICD-10-CM

## 2023-12-13 RX ORDER — ROSUVASTATIN CALCIUM 10 MG/1
10 TABLET, COATED ORAL DAILY
Qty: 90 TABLET | Refills: 0 | Status: SHIPPED | OUTPATIENT
Start: 2023-12-13

## 2023-12-13 RX ORDER — BUPROPION HYDROCHLORIDE 150 MG/1
150 TABLET, EXTENDED RELEASE ORAL 2 TIMES DAILY
Qty: 180 TABLET | Refills: 0 | Status: SHIPPED | OUTPATIENT
Start: 2023-12-13

## 2023-12-13 RX ORDER — HYDROCHLOROTHIAZIDE 12.5 MG/1
12.5 TABLET ORAL DAILY
Qty: 90 TABLET | Refills: 0 | Status: SHIPPED | OUTPATIENT
Start: 2023-12-13

## 2023-12-13 RX ORDER — METFORMIN HYDROCHLORIDE 500 MG/1
1000 TABLET, EXTENDED RELEASE ORAL 2 TIMES DAILY
Qty: 360 TABLET | Refills: 0 | Status: SHIPPED | OUTPATIENT
Start: 2023-12-13

## 2023-12-13 RX ORDER — LOSARTAN POTASSIUM 50 MG/1
50 TABLET ORAL DAILY
Qty: 90 TABLET | Refills: 0 | Status: SHIPPED | OUTPATIENT
Start: 2023-12-13

## 2023-12-13 RX ORDER — ALBUTEROL SULFATE 90 UG/1
2 AEROSOL, METERED RESPIRATORY (INHALATION) EVERY 4 HOURS PRN
Qty: 8.5 G | Refills: 0 | Status: SHIPPED | OUTPATIENT
Start: 2023-12-13

## 2023-12-13 RX ORDER — LEVOTHYROXINE SODIUM 0.05 MG/1
50 TABLET ORAL DAILY
Qty: 90 TABLET | Refills: 0 | Status: SHIPPED | OUTPATIENT
Start: 2023-12-13

## 2023-12-18 DIAGNOSIS — E11.9 TYPE 2 DIABETES MELLITUS WITHOUT COMPLICATION, WITHOUT LONG-TERM CURRENT USE OF INSULIN: ICD-10-CM

## 2023-12-18 RX ORDER — SEMAGLUTIDE 1.34 MG/ML
1 INJECTION, SOLUTION SUBCUTANEOUS WEEKLY
Qty: 9 ML | Refills: 0 | Status: SHIPPED | OUTPATIENT
Start: 2023-12-18

## 2023-12-26 DIAGNOSIS — M17.11 PRIMARY OSTEOARTHRITIS OF RIGHT KNEE: ICD-10-CM

## 2023-12-26 RX ORDER — ETODOLAC 400 MG/1
400 TABLET, EXTENDED RELEASE ORAL DAILY
Qty: 30 TABLET | Refills: 0 | OUTPATIENT
Start: 2023-12-26

## 2024-02-27 DIAGNOSIS — E11.9 TYPE 2 DIABETES MELLITUS WITHOUT COMPLICATION, WITHOUT LONG-TERM CURRENT USE OF INSULIN: ICD-10-CM

## 2024-02-27 RX ORDER — SEMAGLUTIDE 1.34 MG/ML
1 INJECTION, SOLUTION SUBCUTANEOUS WEEKLY
Qty: 9 ML | Refills: 0 | Status: SHIPPED | OUTPATIENT
Start: 2024-02-27

## 2024-05-08 DIAGNOSIS — E11.9 TYPE 2 DIABETES MELLITUS WITHOUT COMPLICATION, WITHOUT LONG-TERM CURRENT USE OF INSULIN: ICD-10-CM

## 2024-05-08 RX ORDER — SEMAGLUTIDE 1.34 MG/ML
1 INJECTION, SOLUTION SUBCUTANEOUS WEEKLY
Qty: 9 ML | Refills: 0 | Status: SHIPPED | OUTPATIENT
Start: 2024-05-08

## 2024-07-25 DIAGNOSIS — E11.9 TYPE 2 DIABETES MELLITUS WITHOUT COMPLICATION, WITHOUT LONG-TERM CURRENT USE OF INSULIN: ICD-10-CM

## 2024-07-25 RX ORDER — SEMAGLUTIDE 1.34 MG/ML
1 INJECTION, SOLUTION SUBCUTANEOUS WEEKLY
Qty: 9 ML | Refills: 0 | OUTPATIENT
Start: 2024-07-25

## 2024-09-06 DIAGNOSIS — M85.89 OTHER SPECIFIED DISORDERS OF BONE DENSITY AND STRUCTURE, MULTIPLE SITES: ICD-10-CM

## 2024-09-06 RX ORDER — CALCIUM CARBONATE 600 MG
1 TABLET ORAL DAILY
Qty: 60 TABLET | OUTPATIENT
Start: 2024-09-06

## (undated) DEVICE — INTEGRATED CASSETTE TUBING, DO NOT USE IF PACKAGE IS DAMAGED: Brand: CROSSFLOW

## (undated) DEVICE — APPL CHLORAPREP HI/LITE 26ML ORNG

## (undated) DEVICE — STERILE POLYISOPRENE POWDER-FREE SURGICAL GLOVES WITH EMOLLIENT COATING: Brand: PROTEXIS

## (undated) DEVICE — BNDG ELAS ECON W/CLIP 6IN 5YD LF STRL

## (undated) DEVICE — SOL IRR NACL 0.9PCT 3000ML

## (undated) DEVICE — GLV SURG SENSICARE SLT PF LF 6 STRL

## (undated) DEVICE — GLV SURG SENSICARE PI ORTHO SZ8 LF STRL

## (undated) DEVICE — BLD CUT FORMLA AGGR 3.5MM

## (undated) DEVICE — KNEE ARTHROSCOPY-LF: Brand: MEDLINE INDUSTRIES, INC.

## (undated) DEVICE — GAUZE,SPONGE,4"X4",16PLY,STRL,LF,10/TRAY: Brand: MEDLINE

## (undated) DEVICE — DISPOSABLE TOURNIQUET CUFF SINGLE BLADDER, SINGLE PORT AND QUICK CONNECT CONNECTOR: Brand: COLOR CUFF

## (undated) DEVICE — 90-S CRUISE, SUCTION PROBE, NON-BENDABLE, MAX CUT LEVEL 1: Brand: SERFAS ENERGY

## (undated) DEVICE — STERILE POLYISOPRENE POWDER-FREE SURGICAL GLOVES: Brand: PROTEXIS

## (undated) DEVICE — GOWN,REINFORCE,POLY,SIRUS,BREATH SLV,XLG: Brand: MEDLINE

## (undated) DEVICE — DRSNG GZ PETROLTM XEROFORM CURAD 1X8IN STRL

## (undated) DEVICE — BNDG ELAS ECON W/CLIP 4IN 5YD LF STRL

## (undated) DEVICE — SUT ETHLN 3-0 FS118IN 663H